# Patient Record
Sex: FEMALE | Race: WHITE | URBAN - METROPOLITAN AREA
[De-identification: names, ages, dates, MRNs, and addresses within clinical notes are randomized per-mention and may not be internally consistent; named-entity substitution may affect disease eponyms.]

---

## 2017-07-02 ENCOUNTER — EMERGENCY (EMERGENCY)
Facility: HOSPITAL | Age: 82
LOS: 0 days | Discharge: HOME | End: 2017-07-02

## 2017-07-02 DIAGNOSIS — I10 ESSENTIAL (PRIMARY) HYPERTENSION: ICD-10-CM

## 2017-07-02 DIAGNOSIS — M79.89 OTHER SPECIFIED SOFT TISSUE DISORDERS: ICD-10-CM

## 2017-07-02 DIAGNOSIS — M25.572 PAIN IN LEFT ANKLE AND JOINTS OF LEFT FOOT: ICD-10-CM

## 2017-07-02 DIAGNOSIS — E78.5 HYPERLIPIDEMIA, UNSPECIFIED: ICD-10-CM

## 2017-07-02 DIAGNOSIS — L53.9 ERYTHEMATOUS CONDITION, UNSPECIFIED: ICD-10-CM

## 2017-07-02 DIAGNOSIS — Z88.2 ALLERGY STATUS TO SULFONAMIDES: ICD-10-CM

## 2017-07-02 DIAGNOSIS — Z79.899 OTHER LONG TERM (CURRENT) DRUG THERAPY: ICD-10-CM

## 2017-07-02 DIAGNOSIS — Z88.0 ALLERGY STATUS TO PENICILLIN: ICD-10-CM

## 2017-07-02 DIAGNOSIS — Z96.652 PRESENCE OF LEFT ARTIFICIAL KNEE JOINT: ICD-10-CM

## 2017-07-02 DIAGNOSIS — Z95.5 PRESENCE OF CORONARY ANGIOPLASTY IMPLANT AND GRAFT: ICD-10-CM

## 2017-09-29 ENCOUNTER — OUTPATIENT (OUTPATIENT)
Dept: OUTPATIENT SERVICES | Facility: HOSPITAL | Age: 82
LOS: 1 days | Discharge: HOME | End: 2017-09-29

## 2017-09-29 DIAGNOSIS — Z12.31 ENCOUNTER FOR SCREENING MAMMOGRAM FOR MALIGNANT NEOPLASM OF BREAST: ICD-10-CM

## 2018-11-01 ENCOUNTER — OUTPATIENT (OUTPATIENT)
Dept: OUTPATIENT SERVICES | Facility: HOSPITAL | Age: 83
LOS: 1 days | Discharge: HOME | End: 2018-11-01

## 2018-11-01 DIAGNOSIS — Z85.3 PERSONAL HISTORY OF MALIGNANT NEOPLASM OF BREAST: ICD-10-CM

## 2018-11-01 DIAGNOSIS — Z12.31 ENCOUNTER FOR SCREENING MAMMOGRAM FOR MALIGNANT NEOPLASM OF BREAST: ICD-10-CM

## 2018-11-27 ENCOUNTER — OUTPATIENT (OUTPATIENT)
Dept: OUTPATIENT SERVICES | Facility: HOSPITAL | Age: 83
LOS: 1 days | Discharge: HOME | End: 2018-11-27

## 2018-11-27 DIAGNOSIS — J40 BRONCHITIS, NOT SPECIFIED AS ACUTE OR CHRONIC: ICD-10-CM

## 2019-03-18 ENCOUNTER — INPATIENT (INPATIENT)
Facility: HOSPITAL | Age: 84
LOS: 10 days | Discharge: HOME | End: 2019-03-29
Attending: OBSTETRICS & GYNECOLOGY | Admitting: OBSTETRICS & GYNECOLOGY
Payer: MEDICARE

## 2019-03-18 VITALS
HEART RATE: 80 BPM | TEMPERATURE: 96 F | OXYGEN SATURATION: 94 % | RESPIRATION RATE: 20 BRPM | SYSTOLIC BLOOD PRESSURE: 127 MMHG | DIASTOLIC BLOOD PRESSURE: 72 MMHG

## 2019-03-18 DIAGNOSIS — Z98.890 OTHER SPECIFIED POSTPROCEDURAL STATES: Chronic | ICD-10-CM

## 2019-03-18 DIAGNOSIS — Z96.652 PRESENCE OF LEFT ARTIFICIAL KNEE JOINT: Chronic | ICD-10-CM

## 2019-03-18 LAB
ALBUMIN SERPL ELPH-MCNC: 3.4 G/DL — LOW (ref 3.5–5.2)
ALP SERPL-CCNC: 159 U/L — HIGH (ref 30–115)
ALT FLD-CCNC: 58 U/L — HIGH (ref 0–41)
ANION GAP SERPL CALC-SCNC: 16 MMOL/L — HIGH (ref 7–14)
APTT BLD: 31.4 SEC — SIGNIFICANT CHANGE UP (ref 27–39.2)
AST SERPL-CCNC: 45 U/L — HIGH (ref 0–41)
BASOPHILS # BLD AUTO: 0 K/UL — SIGNIFICANT CHANGE UP (ref 0–0.2)
BASOPHILS NFR BLD AUTO: 0 % — SIGNIFICANT CHANGE UP (ref 0–1)
BILIRUB SERPL-MCNC: 0.5 MG/DL — SIGNIFICANT CHANGE UP (ref 0.2–1.2)
BUN SERPL-MCNC: 78 MG/DL — CRITICAL HIGH (ref 10–20)
CALCIUM SERPL-MCNC: 9.6 MG/DL — SIGNIFICANT CHANGE UP (ref 8.5–10.1)
CHLORIDE SERPL-SCNC: 101 MMOL/L — SIGNIFICANT CHANGE UP (ref 98–110)
CO2 SERPL-SCNC: 27 MMOL/L — SIGNIFICANT CHANGE UP (ref 17–32)
CREAT SERPL-MCNC: 1.2 MG/DL — SIGNIFICANT CHANGE UP (ref 0.7–1.5)
EOSINOPHIL # BLD AUTO: 0.17 K/UL — SIGNIFICANT CHANGE UP (ref 0–0.7)
EOSINOPHIL NFR BLD AUTO: 0.9 % — SIGNIFICANT CHANGE UP (ref 0–8)
GLUCOSE SERPL-MCNC: 101 MG/DL — HIGH (ref 70–99)
HCT VFR BLD CALC: 40.9 % — SIGNIFICANT CHANGE UP (ref 37–47)
HGB BLD-MCNC: 13.6 G/DL — SIGNIFICANT CHANGE UP (ref 12–16)
INR BLD: 1.47 RATIO — HIGH (ref 0.65–1.3)
LACTATE SERPL-SCNC: 1 MMOL/L — SIGNIFICANT CHANGE UP (ref 0.5–2.2)
LYMPHOCYTES # BLD AUTO: 0.67 K/UL — LOW (ref 1.2–3.4)
LYMPHOCYTES # BLD AUTO: 3.5 % — LOW (ref 20.5–51.1)
MANUAL SMEAR VERIFICATION: SIGNIFICANT CHANGE UP
MCHC RBC-ENTMCNC: 30.8 PG — SIGNIFICANT CHANGE UP (ref 27–31)
MCHC RBC-ENTMCNC: 33.3 G/DL — SIGNIFICANT CHANGE UP (ref 32–37)
MCV RBC AUTO: 92.5 FL — SIGNIFICANT CHANGE UP (ref 81–99)
MONOCYTES # BLD AUTO: 2.34 K/UL — HIGH (ref 0.1–0.6)
MONOCYTES NFR BLD AUTO: 12.3 % — HIGH (ref 1.7–9.3)
NEUTROPHILS # BLD AUTO: 14.53 K/UL — HIGH (ref 1.4–6.5)
NEUTROPHILS NFR BLD AUTO: 75.4 % — HIGH (ref 42.2–75.2)
NEUTS BAND # BLD: 0.9 % — SIGNIFICANT CHANGE UP (ref 0–6)
PLAT MORPH BLD: NORMAL — SIGNIFICANT CHANGE UP
PLATELET # BLD AUTO: 283 K/UL — SIGNIFICANT CHANGE UP (ref 130–400)
POIKILOCYTOSIS BLD QL AUTO: SIGNIFICANT CHANGE UP
POLYCHROMASIA BLD QL SMEAR: SLIGHT — SIGNIFICANT CHANGE UP
POTASSIUM SERPL-MCNC: 3.8 MMOL/L — SIGNIFICANT CHANGE UP (ref 3.5–5)
POTASSIUM SERPL-SCNC: 3.8 MMOL/L — SIGNIFICANT CHANGE UP (ref 3.5–5)
PROMYELOCYTES # FLD: 1.7 % — HIGH (ref 0–0)
PROT SERPL-MCNC: 5.9 G/DL — LOW (ref 6–8)
PROTHROM AB SERPL-ACNC: 16.8 SEC — HIGH (ref 9.95–12.87)
RBC # BLD: 4.42 M/UL — SIGNIFICANT CHANGE UP (ref 4.2–5.4)
RBC # FLD: 14.5 % — SIGNIFICANT CHANGE UP (ref 11.5–14.5)
RBC BLD AUTO: NORMAL — SIGNIFICANT CHANGE UP
SODIUM SERPL-SCNC: 144 MMOL/L — SIGNIFICANT CHANGE UP (ref 135–146)
VARIANT LYMPHS # BLD: 5.3 % — HIGH (ref 0–5)
WBC # BLD: 19.04 K/UL — HIGH (ref 4.8–10.8)
WBC # FLD AUTO: 19.04 K/UL — HIGH (ref 4.8–10.8)

## 2019-03-18 RX ORDER — SODIUM CHLORIDE 9 MG/ML
500 INJECTION INTRAMUSCULAR; INTRAVENOUS; SUBCUTANEOUS ONCE
Qty: 0 | Refills: 0 | Status: COMPLETED | OUTPATIENT
Start: 2019-03-18 | End: 2019-03-18

## 2019-03-18 RX ORDER — ATORVASTATIN CALCIUM 80 MG/1
1 TABLET, FILM COATED ORAL
Qty: 0 | Refills: 0 | COMMUNITY

## 2019-03-18 RX ORDER — SODIUM CHLORIDE 9 MG/ML
1000 INJECTION, SOLUTION INTRAVENOUS
Qty: 0 | Refills: 0 | Status: DISCONTINUED | OUTPATIENT
Start: 2019-03-18 | End: 2019-03-19

## 2019-03-18 RX ORDER — METRONIDAZOLE 500 MG
500 TABLET ORAL ONCE
Qty: 0 | Refills: 0 | Status: COMPLETED | OUTPATIENT
Start: 2019-03-18 | End: 2019-03-18

## 2019-03-18 RX ORDER — VANCOMYCIN HCL 1 G
1000 VIAL (EA) INTRAVENOUS EVERY 12 HOURS
Qty: 0 | Refills: 0 | Status: DISCONTINUED | OUTPATIENT
Start: 2019-03-18 | End: 2019-03-20

## 2019-03-18 RX ORDER — AZTREONAM 2 G
2000 VIAL (EA) INJECTION EVERY 12 HOURS
Qty: 0 | Refills: 0 | Status: DISCONTINUED | OUTPATIENT
Start: 2019-03-18 | End: 2019-03-20

## 2019-03-18 RX ORDER — METRONIDAZOLE 500 MG
500 TABLET ORAL EVERY 8 HOURS
Qty: 0 | Refills: 0 | Status: DISCONTINUED | OUTPATIENT
Start: 2019-03-18 | End: 2019-03-20

## 2019-03-18 RX ORDER — CLOPIDOGREL BISULFATE 75 MG/1
1 TABLET, FILM COATED ORAL
Qty: 0 | Refills: 0 | COMMUNITY

## 2019-03-18 RX ORDER — AZTREONAM 2 G
2000 VIAL (EA) INJECTION ONCE
Qty: 0 | Refills: 0 | Status: COMPLETED | OUTPATIENT
Start: 2019-03-18 | End: 2019-03-18

## 2019-03-18 RX ORDER — ATENOLOL 25 MG/1
1 TABLET ORAL
Qty: 0 | Refills: 0 | COMMUNITY

## 2019-03-18 RX ORDER — CANDESARTAN CILEXETIL 8 MG/1
1 TABLET ORAL
Qty: 0 | Refills: 0 | COMMUNITY

## 2019-03-18 RX ORDER — ESCITALOPRAM OXALATE 10 MG/1
1 TABLET, FILM COATED ORAL
Qty: 0 | Refills: 0 | COMMUNITY

## 2019-03-18 RX ORDER — APIXABAN 2.5 MG/1
1 TABLET, FILM COATED ORAL
Qty: 0 | Refills: 0 | COMMUNITY

## 2019-03-18 RX ORDER — CHLORTHALIDONE 50 MG
1 TABLET ORAL
Qty: 0 | Refills: 0 | COMMUNITY

## 2019-03-18 RX ORDER — VANCOMYCIN HCL 1 G
1000 VIAL (EA) INTRAVENOUS ONCE
Qty: 0 | Refills: 0 | Status: COMPLETED | OUTPATIENT
Start: 2019-03-18 | End: 2019-03-18

## 2019-03-18 RX ADMIN — Medication 100 MILLIGRAM(S): at 23:19

## 2019-03-18 RX ADMIN — SODIUM CHLORIDE 125 MILLILITER(S): 9 INJECTION, SOLUTION INTRAVENOUS at 21:04

## 2019-03-18 RX ADMIN — Medication 250 MILLIGRAM(S): at 21:22

## 2019-03-18 RX ADMIN — SODIUM CHLORIDE 500 MILLILITER(S): 9 INJECTION INTRAMUSCULAR; INTRAVENOUS; SUBCUTANEOUS at 20:32

## 2019-03-18 RX ADMIN — Medication 100 MILLIGRAM(S): at 18:34

## 2019-03-18 NOTE — H&P ADULT - HISTORY OF PRESENT ILLNESS
89yo , with right labial pain and swelling since Monday. She presented to her Gynecologist on Tuesday, Dr. Prather, who subsequently performed and L+D, and sent her home with Doxycycline BID for 7 days. She reports fevers of 100.7 that evening, which resolved the next day. She states since Tuesday, her right ford has been draining blood and dark fluid, foul smelling, and is extremely painful, 10/10. She stopped taking the doxycycline on Wednesday, secondary to dizziness, nausea, vomiting, prompting a fall around 0400 on Wednesday. She denies head trauma, and bruised her knees and wrists. She reports since then, the swelling has become larger and more painful at rest. Denies difficulty with urination, bowel movements, sexual activity, CP, SOB, palpitations, abdominal pain, vaginal pain. Reports she waited until this Monday to present to the ED secondary to having family in town. 89yo , with right labial pain and swelling since Monday. She presented to her Gynecologist on Tuesday, Dr. Prather, who subsequently performed and L+D, and sent her home with Doxycycline BID for 7 days. She reports fevers of 100.7 that evening, which resolved the next day. She states since Tuesday, her right ford has been draining blood and dark fluid, foul smelling, and is extremely painful, 10/10. She stopped taking the doxycycline on Wednesday, secondary to dizziness, nausea, vomiting, prompting a fall around 0400 on Wednesday. She denies head trauma, and bruised her knees and wrists. She reports since then, the swelling has become larger and more painful at rest. Denies difficulty with urination, bowel movements, sexual activity, CP, SOB, palpitations, abdominal pain, vaginal pain. Reports she waited until this Monday to present to the ED secondary to having family in town.   Ob: P2, NSVDX2 at [facility].   Gyn: Denies history of abnormal papsmears, STIs, uterine fibroids or ovarian cysts. Reports history of breast cancer in , s/p lumpectomy and radiation. No history of pelvic abscess or cysts. Not sexually active.

## 2019-03-18 NOTE — H&P ADULT - NSHPPHYSICALEXAM_GEN_ALL_CORE
Vital Signs Last 24 Hrs  T(C): 36.2 (18 Mar 2019 15:10), Max: 36.2 (18 Mar 2019 15:10)  T(F): 97.1 (18 Mar 2019 15:10), Max: 97.1 (18 Mar 2019 15:10)  HR: 76 (18 Mar 2019 15:10) (76 - 80)  BP: 124/64 (18 Mar 2019 15:10) (124/64 - 127/72)  RR: 19 (18 Mar 2019 15:10) (19 - 20)  SpO2: 96% (18 Mar 2019 15:10) (94% - 96%)    Gen: A+OX3. NAD.   CV: S1+S2.  Pulm: CTAB  Pelvis: right labial swelling, hard to palpation, tender, extending from the asis to the perineum, with dark, foul smelling fluid draining from inferior aspect of the infected area. Wound able to probed with qtip 8cm superiorly towards the mons pubis. Tender lymphadenopathy. No rectal tenderness. Cultures taken and sent. Vital Signs Last 24 Hrs  T(C): 36.2 (18 Mar 2019 15:10), Max: 36.2 (18 Mar 2019 15:10)  T(F): 97.1 (18 Mar 2019 15:10), Max: 97.1 (18 Mar 2019 15:10)  HR: 76 (18 Mar 2019 15:10) (76 - 80)  BP: 124/64 (18 Mar 2019 15:10) (124/64 - 127/72)  RR: 19 (18 Mar 2019 15:10) (19 - 20)  SpO2: 96% (18 Mar 2019 15:10) (94% - 96%)    Gen: A+OX3. NAD.   CV: S1+S2.  Pulm: CTAB  Pelvis: right labial swelling, about [id number], hard to palpation, tender, extending from the asis to the perineum, with dark, foul smelling fluid draining from inferior aspect of the infected area. Wound able to probed with qtip 8cm superiorly towards the mons pubis. Tender lymphadenopathy. No rectal tenderness. Cultures taken and sent. Vital Signs Last 24 Hrs  T(C): 36.2 (18 Mar 2019 15:10), Max: 36.2 (18 Mar 2019 15:10)  T(F): 97.1 (18 Mar 2019 15:10), Max: 97.1 (18 Mar 2019 15:10)  HR: 76 (18 Mar 2019 15:10) (76 - 80)  BP: 124/64 (18 Mar 2019 15:10) (124/64 - 127/72)  RR: 19 (18 Mar 2019 15:10) (19 - 20)  SpO2: 96% (18 Mar 2019 15:10) (94% - 96%)    Gen: A+OX3. NAD.   CV: S1+S2.  Pulm: CTAB  Pelvis: external genitalia: right labia majora with edema and induration. bottom 1/3 with 1cm area of yellow discoloration. 1cm caudal to that area there a small area of fluctuation. Upon separation of labia, a 0.5cm opening noted on right labia, from were brown foul smelling thick fluid is draining, cultures taken of this. Swelling and induration extends to the entire mons pubis. Tenderness upon palpation of right labia and R>L mons pubis. Lymphadenopathy palpated on the right side, tender. Pt could not tolerate speculum exam. Limited digital exam revealed tenderness to palpation to right vaginal wall and posterior vaginal wall. Rectal exam revealed no tenderness, no masses.   Through existing incision, syringe was used to irrigate with NS copiously. Qtip inserted to break loculations, and qtip was able to go 8cm cephalad towards the mons pubis, 3cm caudal towards the perineum.

## 2019-03-18 NOTE — CONSULT NOTE ADULT - ASSESSMENT
89 year old female with labial abscess s/p drainage by her gynecologist. Possible bartholin cyst vs abscess. Patient will need further incision in drainage. Surgery available at any time. Will continue to follow, 89 year old female with labial abscess s/p drainage by her gynecologist. Possible bartholin cyst vs abscess. Patient will need further incision in drainage. Surgery available at any time. Will continue to follow,   Senior Note  I have personally examined and evaluated the patient  I agree with the above plan and note  Surgical Attending aware and agrees with plan

## 2019-03-18 NOTE — ED PROVIDER NOTE - OBJECTIVE STATEMENT
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  pt states that she developed labial abscess 1 week ago and had it drained 6 days ago by her OB/GYN, had abscess drained and started on doxy.  Stopped taking med after 2.5 days 2/2 intolerance.  Since then noticed increased size of abscess and surrounding erythema.  today abscess w/ purulent drainage and erythema of entire right labia prompting visit to the ED.  Also reports mechanical trip and fall 5 days ago onto knees and right hand w/ no head injury or LOC.  Complaining of right wrist pain, bilateral knee pain.  reports ecchymosis to bilateral knees.  Describes pain as intermittent, mild severity, worse w/ ROm and alleviated w/ rest.  Denies fever, N/V/D, headache, chest pain, dyspnea, urinary sxs.  no further complaints.  has been ambulatory since the fall.

## 2019-03-18 NOTE — ED PROVIDER NOTE - PHYSICAL EXAMINATION
CONST: Well appearing in NAD  HEAD: NC/AT   EYES: Sclera and conjunctiva clear.  NECK: Non-tender, no meningeal signs, supple, no midline C/T/L tenderness  CARD: Normal S1 S2; Normal rate and rhythm  RESP: Equal BS B/L, No wheezes, rhonchi or rales. No distress  GI: Soft, non-tender, non-distended.  MS: + ecchymosis of bilateral knees, no pain w/ ROM, no joint laxity, no hip pain, + ecchymosis of wrist, + TTP of distal radius, Normal ROM in all extremities. No edema of lower extremities, no calf pain, radial pulses 2+ bilaterally  SKIN: Warm, dry, no acute rashes. Good turgor  NEURO: A&Ox3, No focal deficits. Strength 5/5 with no sensory deficits

## 2019-03-18 NOTE — ED PROVIDER NOTE - NS ED ROS FT
Constitutional: See HPI.  Eyes: No visual changes, eye pain or discharge  ENMT: No hearing changes, pain, discharge or infections. No neck pain or stiffness.   Cardiac: No SOB or edema. No chest pain with exertion.  Respiratory: No cough or respiratory distress.   GI: No nausea, vomiting, diarrhea or abdominal pain.  : + labial abscess, No dysuria, frequency or burning. No Discharge  MS:  + b/l knee pain, + right wrist pain. No myalgia, muscle weakness, joint pain or back pain.  Neuro: No headache or weakness. No LOC.  Skin: No skin rash.  Except as documented in the HPI, all other systems are negative.

## 2019-03-18 NOTE — ED PROVIDER NOTE - ATTENDING CONTRIBUTION TO CARE
89 yo female with PMH HTN, HLD, CAD s/p stent on Plavix, Afib on Eliquis, hx breast cancer presents for worsening labial abscess. Pt began having sx 1 week ago, had it drained by her GYN and was started on doxycycline which she took for 2 days and could not tolerate. Since that time pain and swelling have increased, no reported fevers or chills but decreased appetite noted. Also c/o ecchymoses to B/L knees s/p fall 5 days ago. Denies any head trauma, HA, dizziness, CP, N/V, SOB or palpitations. VS noted, agree with exam as above.  A/P: Labial abscess; Fall -labs, IV abx, GYN consult, XR, CT A/P, reassess & admit

## 2019-03-18 NOTE — H&P ADULT - ASSESSMENT
89yo , with likely right labial abscess with an elevated CBC count, and stable vital signs.   -Admit to GYN  -IV Antibiotics- Flagyl, aztreonam, vancomycin   -CT scan  -NPO until after CT  -CBC, CMP, lactate  -Ambulation with assistance  -IVF hydration   -punch biopsy tomorrow 89yo , with likely right labial abscess with an elevated CBC count, and stable vital signs.   -vanco/aztreonam/flagyl (per [facility] ID reccs for [place] allergy skin structure infection guideline  - reg diet (needs to be ordered after CT scan)  - SCDs  - ambulate with assistance  - f/u AM labs CBC, BMP, mag/phos (to be ordered)  - will need repeat drainage/I&D with biopsy tomorrow w/ possible packing  -need punch biopsy from [facility]  - f/u CT ab/pel (ordered by ED)  -call [facility] for records 89yo , with likely right labial abscess with an elevated CBC count, and stable vital signs.   -vanco/aztreonam/flagyl (per [facility] ID reccs for [place] allergy skin structure infection guideline  - reg diet (needs to be ordered after CT scan)  - SCDs  - ambulate with assistance  - f/u AM labs CBC, BMP, mag/phos (to be ordered)  - will need repeat drainage/I&D with biopsy tomorrow w/ possible packing  - f/u CT ab/pel  -call Dr Baltazar for records

## 2019-03-18 NOTE — ED PROVIDER NOTE - PROGRESS NOTE DETAILS
discussed case w/ lora, ob/gyn.  states to call back w/ wbc count Case d/w GYN who evaluated pt I ED earlier and requested Azactam/Flagyl/Vanco and CT A/P with planned admission to medicine for further treatment. Pt s/o to  Dr. Garcia to follow up CT A/P, reassess and admit. Case d/w GYN who evaluated pt I ED earlier and requested Azactam/Flagyl/Vanco and CT A/P with planned admission to medicine for further treatment. CT A/P showing necrotic abscess with air noted. Gen surgery consult called, Dr. Lundberg to evaluate pt. GYN notified about CT, senior resident Pearl to speak with Dr. Lieberman. Pt s/o to  Dr. Garcia to follow up surgery & gyn consult, reassess and admit. Case endorsed to me by Dr. Packer --- patient with failed outpatient treatment of labial abscess (oral abx and bedside I/D), now with leukocytosis, CT with air concerning for necrotizing infection vs post I/D air  -- patient was seen by GYN and surgery -- given abx according to guidelines for allergy and deep tissue infection, will be admitted to  GYN for further management of deep tissue infection, surgery will follow as inpatient. lactate is 1, hemodynamically stable.

## 2019-03-18 NOTE — H&P ADULT - NSHPLABSRESULTS_GEN_ALL_CORE
Complete Blood Count + Automated Diff (03.18.19 @ 15:30)    WBC Count: 19.04 K/uL    RBC Count: 4.42 M/uL    Hemoglobin: 13.6 g/dL    Hematocrit: 40.9 %    Mean Cell Volume: 92.5 fL    Mean Cell Hemoglobin: 30.8 pg    Mean Cell Hemoglobin Conc: 33.3 g/dL    Red Cell Distrib Width: 14.5 %    Platelet Count - Automated: 283 K/uL Complete Blood Count + Automated Diff (03.18.19 @ 15:30)    WBC Count: 19.04 K/uL    RBC Count: 4.42 M/uL    Hemoglobin: 13.6 g/dL    Hematocrit: 40.9 %    Mean Cell Volume: 92.5 fL    Mean Cell Hemoglobin: 30.8 pg    Mean Cell Hemoglobin Conc: 33.3 g/dL    Red Cell Distrib Width: 14.5 %    Platelet Count - Automated: 283 K/uL    Lactate: 1.0

## 2019-03-18 NOTE — CONSULT NOTE ADULT - SUBJECTIVE AND OBJECTIVE BOX
This is a 89 year old female with PMH of ___ . Presents to the ED with        Labs:  CAPILLARY BLOOD GLUCOSE                  13.6   19.04 )-----------( 283      ( 18 Mar 2019 15:30 )             40.9       Auto Neutrophil %: 75.4 % (03-18-19 @ 15:30)  Band Neutrophils %: 0.9 % (03-18-19 @ 15:30)    03-18    144  |  101  |  78<HH>  ----------------------------<  101<H>  3.8   |  27  |  1.2      Calcium, Total Serum: 9.6 mg/dL (03-18-19 @ 15:30)      LFTs:             5.9  | 0.5  | 45       ------------------[159     ( 18 Mar 2019 15:30 )  3.4  | x    | 58          Lipase:x      Amylase:x         Lactate, Blood: 1.0 mmol/L (03-18-19 @ 15:30)      Coags:     16.80  ----< 1.47    ( 18 Mar 2019 15:30 )     31.4       < from: CT Abdomen and Pelvis w/ IV Cont (03.18.19 @ 21:18) >  Right labial abscess containing multiple foci of air measuring 4 x 4 x 6   cm.this may reflect underlying necrosis and may also reflect sequela of   prior drainage. Abscess tracks superiorly along anterior subcutaneous   fat. No liquid drainable collection.    < end of copied text > This is a 89 year old female with PMH of afib, coronary artery stent, breast cancer, and cataract surgery. Medications include plavix, chlorthalidone, atenolol, eliquis, candesartan, atorvastatin, and lexapro. Presents to the ED with worsening painful, draining labial abscess that she first noted approximately one week ago. Patent went to her OB/GYN who drained it and prescribed her antibiotics, which she stopped taking after approximately one day. Patient stated there has been constant, worsening bloody/dark colored drainage from the area and worsening pain that patient states is 10/10. Patient states she had one fever following the drainage by her gynecologist. Denies any other fever, chills, nausea, and vomiting.       Labs:  CAPILLARY BLOOD GLUCOSE                  13.6   19.04 )-----------( 283      ( 18 Mar 2019 15:30 )             40.9       Auto Neutrophil %: 75.4 % (03-18-19 @ 15:30)  Band Neutrophils %: 0.9 % (03-18-19 @ 15:30)    03-18    144  |  101  |  78<HH>  ----------------------------<  101<H>  3.8   |  27  |  1.2      Calcium, Total Serum: 9.6 mg/dL (03-18-19 @ 15:30)      LFTs:             5.9  | 0.5  | 45       ------------------[159     ( 18 Mar 2019 15:30 )  3.4  | x    | 58          Lipase:x      Amylase:x         Lactate, Blood: 1.0 mmol/L (03-18-19 @ 15:30)      Coags:     16.80  ----< 1.47    ( 18 Mar 2019 15:30 )     31.4       < from: CT Abdomen and Pelvis w/ IV Cont (03.18.19 @ 21:18) >  Right labial abscess containing multiple foci of air measuring 4 x 4 x 6   cm. this may reflect underlying necrosis and may also reflect sequela of   prior drainage. Abscess tracks superiorly along anterior subcutaneous   fat. No liquid drainable collection.    < end of copied text >      Physical exam:   General: Lying in bed. Alert and orientated.   : Swelling and erythema confined to right labia major, tenderness illicited on palpations of area, clear/serosanguinous drainage noted.

## 2019-03-18 NOTE — ED PROVIDER NOTE - CLINICAL SUMMARY MEDICAL DECISION MAKING FREE TEXT BOX
Case endorsed to me by Dr. Packer --- patient with failed outpatient treatment of labial abscess (oral abx and bedside I/D), now with leukocytosis, CT with air concerning for necrotizing infection vs post I/D air  -- patient was seen by GYN and surgery -- given abx according to guidelines for allergy and deep tissue infection, will be admitted to  GYN for further management of deep tissue infection, surgery will follow as inpatient. lactate is 1, hemodynamically stable.

## 2019-03-18 NOTE — H&P ADULT - NSICDXPASTMEDICALHX_GEN_ALL_CORE_FT
PAST MEDICAL HISTORY:  Atrial fibrillation     Breast cancer     Cataract     Stented coronary artery

## 2019-03-19 LAB
ALBUMIN SERPL ELPH-MCNC: 2.6 G/DL — LOW (ref 3.5–5.2)
ALP SERPL-CCNC: 111 U/L — SIGNIFICANT CHANGE UP (ref 30–115)
ALT FLD-CCNC: 34 U/L — SIGNIFICANT CHANGE UP (ref 0–41)
ANION GAP SERPL CALC-SCNC: 12 MMOL/L — SIGNIFICANT CHANGE UP (ref 7–14)
ANION GAP SERPL CALC-SCNC: 15 MMOL/L — HIGH (ref 7–14)
AST SERPL-CCNC: 31 U/L — SIGNIFICANT CHANGE UP (ref 0–41)
BASOPHILS # BLD AUTO: 0.06 K/UL — SIGNIFICANT CHANGE UP (ref 0–0.2)
BASOPHILS NFR BLD AUTO: 0.4 % — SIGNIFICANT CHANGE UP (ref 0–1)
BILIRUB SERPL-MCNC: 0.5 MG/DL — SIGNIFICANT CHANGE UP (ref 0.2–1.2)
BLD GP AB SCN SERPL QL: SIGNIFICANT CHANGE UP
BUN SERPL-MCNC: 41 MG/DL — HIGH (ref 10–20)
BUN SERPL-MCNC: 49 MG/DL — HIGH (ref 10–20)
CALCIUM SERPL-MCNC: 8.1 MG/DL — LOW (ref 8.5–10.1)
CALCIUM SERPL-MCNC: 8.9 MG/DL — SIGNIFICANT CHANGE UP (ref 8.5–10.1)
CHLORIDE SERPL-SCNC: 91 MMOL/L — LOW (ref 98–110)
CHLORIDE SERPL-SCNC: 99 MMOL/L — SIGNIFICANT CHANGE UP (ref 98–110)
CO2 SERPL-SCNC: 23 MMOL/L — SIGNIFICANT CHANGE UP (ref 17–32)
CO2 SERPL-SCNC: 23 MMOL/L — SIGNIFICANT CHANGE UP (ref 17–32)
CREAT SERPL-MCNC: 0.9 MG/DL — SIGNIFICANT CHANGE UP (ref 0.7–1.5)
CREAT SERPL-MCNC: 0.9 MG/DL — SIGNIFICANT CHANGE UP (ref 0.7–1.5)
EOSINOPHIL # BLD AUTO: 0.12 K/UL — SIGNIFICANT CHANGE UP (ref 0–0.7)
EOSINOPHIL NFR BLD AUTO: 0.8 % — SIGNIFICANT CHANGE UP (ref 0–8)
GLUCOSE BLDC GLUCOMTR-MCNC: 112 MG/DL — HIGH (ref 70–99)
GLUCOSE BLDC GLUCOMTR-MCNC: 174 MG/DL — HIGH (ref 70–99)
GLUCOSE SERPL-MCNC: 118 MG/DL — HIGH (ref 70–99)
GLUCOSE SERPL-MCNC: 493 MG/DL — CRITICAL HIGH (ref 70–99)
HCT VFR BLD CALC: 35 % — LOW (ref 37–47)
HGB BLD-MCNC: 11.8 G/DL — LOW (ref 12–16)
IMM GRANULOCYTES NFR BLD AUTO: 5.2 % — HIGH (ref 0.1–0.3)
LACTATE SERPL-SCNC: 1.2 MMOL/L — SIGNIFICANT CHANGE UP (ref 0.5–2.2)
LYMPHOCYTES # BLD AUTO: 1.51 K/UL — SIGNIFICANT CHANGE UP (ref 1.2–3.4)
LYMPHOCYTES # BLD AUTO: 10.3 % — LOW (ref 20.5–51.1)
MAGNESIUM SERPL-MCNC: 1.7 MG/DL — LOW (ref 1.8–2.4)
MCHC RBC-ENTMCNC: 31.1 PG — HIGH (ref 27–31)
MCHC RBC-ENTMCNC: 33.7 G/DL — SIGNIFICANT CHANGE UP (ref 32–37)
MCV RBC AUTO: 92.3 FL — SIGNIFICANT CHANGE UP (ref 81–99)
MONOCYTES # BLD AUTO: 1.46 K/UL — HIGH (ref 0.1–0.6)
MONOCYTES NFR BLD AUTO: 10 % — HIGH (ref 1.7–9.3)
NEUTROPHILS # BLD AUTO: 10.69 K/UL — HIGH (ref 1.4–6.5)
NEUTROPHILS NFR BLD AUTO: 73.3 % — SIGNIFICANT CHANGE UP (ref 42.2–75.2)
NRBC # BLD: 0 /100 WBCS — SIGNIFICANT CHANGE UP (ref 0–0)
PLATELET # BLD AUTO: 248 K/UL — SIGNIFICANT CHANGE UP (ref 130–400)
POTASSIUM SERPL-MCNC: 3.1 MMOL/L — LOW (ref 3.5–5)
POTASSIUM SERPL-MCNC: 3.6 MMOL/L — SIGNIFICANT CHANGE UP (ref 3.5–5)
POTASSIUM SERPL-SCNC: 3.1 MMOL/L — LOW (ref 3.5–5)
POTASSIUM SERPL-SCNC: 3.6 MMOL/L — SIGNIFICANT CHANGE UP (ref 3.5–5)
PROT SERPL-MCNC: 5 G/DL — LOW (ref 6–8)
RBC # BLD: 3.79 M/UL — LOW (ref 4.2–5.4)
RBC # FLD: 14.6 % — HIGH (ref 11.5–14.5)
SODIUM SERPL-SCNC: 126 MMOL/L — LOW (ref 135–146)
SODIUM SERPL-SCNC: 137 MMOL/L — SIGNIFICANT CHANGE UP (ref 135–146)
TYPE + AB SCN PNL BLD: SIGNIFICANT CHANGE UP
WBC # BLD: 14.6 K/UL — HIGH (ref 4.8–10.8)
WBC # FLD AUTO: 14.6 K/UL — HIGH (ref 4.8–10.8)

## 2019-03-19 RX ORDER — DEXTROSE 50 % IN WATER 50 %
15 SYRINGE (ML) INTRAVENOUS ONCE
Qty: 0 | Refills: 0 | Status: DISCONTINUED | OUTPATIENT
Start: 2019-03-19 | End: 2019-03-20

## 2019-03-19 RX ORDER — MORPHINE SULFATE 50 MG/1
4 CAPSULE, EXTENDED RELEASE ORAL EVERY 4 HOURS
Qty: 0 | Refills: 0 | Status: DISCONTINUED | OUTPATIENT
Start: 2019-03-19 | End: 2019-03-20

## 2019-03-19 RX ORDER — ATENOLOL 25 MG/1
25 TABLET ORAL DAILY
Qty: 0 | Refills: 0 | Status: DISCONTINUED | OUTPATIENT
Start: 2019-03-19 | End: 2019-03-20

## 2019-03-19 RX ORDER — SODIUM CHLORIDE 9 MG/ML
1000 INJECTION, SOLUTION INTRAVENOUS
Qty: 0 | Refills: 0 | Status: DISCONTINUED | OUTPATIENT
Start: 2019-03-19 | End: 2019-03-20

## 2019-03-19 RX ORDER — POTASSIUM CHLORIDE 20 MEQ
20 PACKET (EA) ORAL ONCE
Qty: 0 | Refills: 0 | Status: COMPLETED | OUTPATIENT
Start: 2019-03-19 | End: 2019-03-19

## 2019-03-19 RX ORDER — APIXABAN 2.5 MG/1
2.5 TABLET, FILM COATED ORAL EVERY 12 HOURS
Qty: 0 | Refills: 0 | Status: DISCONTINUED | OUTPATIENT
Start: 2019-03-19 | End: 2019-03-19

## 2019-03-19 RX ORDER — GLUCAGON INJECTION, SOLUTION 0.5 MG/.1ML
1 INJECTION, SOLUTION SUBCUTANEOUS ONCE
Qty: 0 | Refills: 0 | Status: DISCONTINUED | OUTPATIENT
Start: 2019-03-19 | End: 2019-03-20

## 2019-03-19 RX ORDER — SODIUM CHLORIDE 9 MG/ML
1000 INJECTION, SOLUTION INTRAVENOUS
Qty: 0 | Refills: 0 | Status: DISCONTINUED | OUTPATIENT
Start: 2019-03-19 | End: 2019-03-19

## 2019-03-19 RX ORDER — ESCITALOPRAM OXALATE 10 MG/1
10 TABLET, FILM COATED ORAL DAILY
Qty: 0 | Refills: 0 | Status: DISCONTINUED | OUTPATIENT
Start: 2019-03-19 | End: 2019-03-20

## 2019-03-19 RX ORDER — ACETAMINOPHEN 500 MG
650 TABLET ORAL EVERY 6 HOURS
Qty: 0 | Refills: 0 | Status: DISCONTINUED | OUTPATIENT
Start: 2019-03-19 | End: 2019-03-20

## 2019-03-19 RX ORDER — ONDANSETRON 8 MG/1
4 TABLET, FILM COATED ORAL ONCE
Qty: 0 | Refills: 0 | Status: DISCONTINUED | OUTPATIENT
Start: 2019-03-19 | End: 2019-03-20

## 2019-03-19 RX ORDER — PANTOPRAZOLE SODIUM 20 MG/1
40 TABLET, DELAYED RELEASE ORAL DAILY
Qty: 0 | Refills: 0 | Status: DISCONTINUED | OUTPATIENT
Start: 2019-03-19 | End: 2019-03-20

## 2019-03-19 RX ORDER — ATORVASTATIN CALCIUM 80 MG/1
40 TABLET, FILM COATED ORAL AT BEDTIME
Qty: 0 | Refills: 0 | Status: DISCONTINUED | OUTPATIENT
Start: 2019-03-19 | End: 2019-03-20

## 2019-03-19 RX ORDER — LOSARTAN POTASSIUM 100 MG/1
50 TABLET, FILM COATED ORAL DAILY
Qty: 0 | Refills: 0 | Status: DISCONTINUED | OUTPATIENT
Start: 2019-03-19 | End: 2019-03-20

## 2019-03-19 RX ORDER — DEXTROSE 50 % IN WATER 50 %
25 SYRINGE (ML) INTRAVENOUS ONCE
Qty: 0 | Refills: 0 | Status: DISCONTINUED | OUTPATIENT
Start: 2019-03-19 | End: 2019-03-20

## 2019-03-19 RX ORDER — DOCUSATE SODIUM 100 MG
100 CAPSULE ORAL DAILY
Qty: 0 | Refills: 0 | Status: DISCONTINUED | OUTPATIENT
Start: 2019-03-19 | End: 2019-03-20

## 2019-03-19 RX ORDER — CLOPIDOGREL BISULFATE 75 MG/1
75 TABLET, FILM COATED ORAL DAILY
Qty: 0 | Refills: 0 | Status: DISCONTINUED | OUTPATIENT
Start: 2019-03-19 | End: 2019-03-20

## 2019-03-19 RX ORDER — DEXTROSE 50 % IN WATER 50 %
12.5 SYRINGE (ML) INTRAVENOUS ONCE
Qty: 0 | Refills: 0 | Status: DISCONTINUED | OUTPATIENT
Start: 2019-03-19 | End: 2019-03-20

## 2019-03-19 RX ORDER — IBUPROFEN 200 MG
600 TABLET ORAL EVERY 6 HOURS
Qty: 0 | Refills: 0 | Status: DISCONTINUED | OUTPATIENT
Start: 2019-03-19 | End: 2019-03-19

## 2019-03-19 RX ORDER — MAGNESIUM SULFATE 500 MG/ML
2 VIAL (ML) INJECTION ONCE
Qty: 0 | Refills: 0 | Status: COMPLETED | OUTPATIENT
Start: 2019-03-19 | End: 2019-03-19

## 2019-03-19 RX ADMIN — Medication 100 MILLIGRAM(S): at 06:08

## 2019-03-19 RX ADMIN — SODIUM CHLORIDE 125 MILLILITER(S): 9 INJECTION, SOLUTION INTRAVENOUS at 06:07

## 2019-03-19 RX ADMIN — ATORVASTATIN CALCIUM 40 MILLIGRAM(S): 80 TABLET, FILM COATED ORAL at 22:12

## 2019-03-19 RX ADMIN — SODIUM CHLORIDE 75 MILLILITER(S): 9 INJECTION, SOLUTION INTRAVENOUS at 19:00

## 2019-03-19 RX ADMIN — Medication 250 MILLIGRAM(S): at 17:13

## 2019-03-19 RX ADMIN — Medication 250 MILLIGRAM(S): at 06:08

## 2019-03-19 RX ADMIN — SODIUM CHLORIDE 125 MILLILITER(S): 9 INJECTION, SOLUTION INTRAVENOUS at 11:14

## 2019-03-19 RX ADMIN — Medication 100 MILLIGRAM(S): at 13:01

## 2019-03-19 RX ADMIN — Medication 50 MILLIEQUIVALENT(S): at 17:27

## 2019-03-19 RX ADMIN — CLOPIDOGREL BISULFATE 75 MILLIGRAM(S): 75 TABLET, FILM COATED ORAL at 11:14

## 2019-03-19 RX ADMIN — PANTOPRAZOLE SODIUM 40 MILLIGRAM(S): 20 TABLET, DELAYED RELEASE ORAL at 06:07

## 2019-03-19 RX ADMIN — APIXABAN 2.5 MILLIGRAM(S): 2.5 TABLET, FILM COATED ORAL at 06:07

## 2019-03-19 RX ADMIN — Medication 100 MILLIGRAM(S): at 17:13

## 2019-03-19 RX ADMIN — Medication 100 MILLIGRAM(S): at 22:12

## 2019-03-19 RX ADMIN — SODIUM CHLORIDE 125 MILLILITER(S): 9 INJECTION, SOLUTION INTRAVENOUS at 06:09

## 2019-03-19 NOTE — CONSULT NOTE ADULT - ASSESSMENT
history  CAD PCI  AFIB  PREOP   at moderate   cardiac  risk         continue plavix        hold   eliquis  1-2  days

## 2019-03-19 NOTE — PROGRESS NOTE ADULT - ASSESSMENT
89yo P2, now hospital day 2, with likely right labial abscess, [id number] labial abscess with free air and underlying necrosis visualized on CT, with improving leukocytosis, and stable vital signs.  - continue vanco/aztreonam/flagyl  - Cardiac clearance for possible OR today   - NPO for OR planning  - elevated glucose on CMP, sliding scale insulin regimen, finger sticks postprandial  - SCDs for DVT prophylaxis  - ambulate with assistance  -call [facility] for records    Dr. Therese guillory

## 2019-03-19 NOTE — PROGRESS NOTE ADULT - ASSESSMENT
89yo P2, now hospital day 2, with likely right labial abscess, [id number] visualized on CT, with an elevated CBC count, and stable vital signs.  -continue vanco/aztreonam/flagyl  - reg diet as tolerated  - SCDs for DVT prophylaxis  - ambulate with assistance  - f/u AM labs CBC, CMP, lactate  - will need repeat drainage/I&D with biopsy today w/ possible packing  - obtain punch biopsy from [facility]   -call [facility] for records    Dr. Lomeli aware

## 2019-03-19 NOTE — PROGRESS NOTE ADULT - SUBJECTIVE AND OBJECTIVE BOX
ROPERTI, BENITEZ  89y      Planned Procedure___I&D of right labial abscess possibly under general anesthesia____________________________________    Does the patient have the following conditions? Type Y or N    __N__DVT/PE           	  Currently anticoagulated ______ IVC Filter _______ Date:______    __N__DM                             Controlled    Y _______ N _______    __Y__HTN	                              Controlled    Y ___x____ N _______    __Y__CAD	                                  Prior MI  _____CABG _____STENT  __x____ EF _____    _N___CHF                             Chronic _____ Acute _____ EF ______    _Y___Afib	                                  Current Rhythm __NSR_______   Current anticoagulation ___apixaban__(now on hold)____    __N__PPM/ICD                         Indication ___________  last Interrogated _________    __N__OSA	                              PAP  Type &Settings _____________    __N__Asthma/COPD	          Last Exac _______ Last Steroid use Date _______     __N__O2 dependent	          Reason ______________    __Y__CKD or ABRAHAN	                  Stable Y ___improving__  N ______    _Y___Electrolyte Abn	          Type _hyponatremia/hypokalemia - awaiting repeat BMP__________     Stable Y ______   N _______    _N___Cirrhosis	                  Cause __________     Stable Y ______   N _______    __N__GI Bleed                          Cause __________     Stable Y ______   N _______    __N__Anemia	                          Cause __________     Stable Y ______   N _______    __N__Transfusion                  Last date ________      _Y___ETOH Use	   wine at parties               Last date________     Intervention __________________________    _N___Tobacco Abuse	          Last date ________    Intervention __________________________    _N___Drug Use	                 Type____________  Last dose _____ Intervention______________    ____Other                             Details_________________________________________________      PAST MEDICAL & SURGICAL HISTORY:  Atrial fibrillation on Eliquis  CAD s/p 3 stents  Cataracts  Breast cancer  S/P lumpectomy, right breast and RT 1992  H/O total knee replacement, left  HTN  hyperlipidemia    Allergies    Levaquin (Unknown)  penicillin (Hives)  sulfa drugs (Hives)    Intolerances      MEDICATIONS  (STANDING):  ATENolol  Tablet 25 milliGRAM(s) Oral daily  atorvastatin 40 milliGRAM(s) Oral at bedtime  aztreonam  IVPB 2000 milliGRAM(s) IV Intermittent every 12 hours  clopidogrel Tablet 75 milliGRAM(s) Oral daily  dextrose 5%. 1000 milliLiter(s) (50 mL/Hr) IV Continuous <Continuous>  dextrose 50% Injectable 12.5 Gram(s) IV Push once  dextrose 50% Injectable 25 Gram(s) IV Push once  dextrose 50% Injectable 25 Gram(s) IV Push once  docusate sodium 100 milliGRAM(s) Oral daily  escitalopram 10 milliGRAM(s) Oral daily  lactated ringers. 1000 milliLiter(s) (125 mL/Hr) IV Continuous <Continuous>  lactated ringers. 1000 milliLiter(s) (125 mL/Hr) IV Continuous <Continuous>  losartan 50 milliGRAM(s) Oral daily  metroNIDAZOLE  IVPB 500 milliGRAM(s) IV Intermittent every 8 hours  ondansetron Injectable 4 milliGRAM(s) IV Push once  pantoprazole  Injectable 40 milliGRAM(s) IV Push daily  vancomycin  IVPB 1000 milliGRAM(s) IV Intermittent every 12 hours    MEDICATIONS  (PRN):  acetaminophen   Tablet .. 650 milliGRAM(s) Oral every 6 hours PRN Temp greater or equal to 38C (100.4F)  dextrose 40% Gel 15 Gram(s) Oral once PRN Blood Glucose LESS THAN 70 milliGRAM(s)/deciliter  glucagon  Injectable 1 milliGRAM(s) IntraMuscular once PRN Glucose LESS THAN 70 milligrams/deciliter  ibuprofen  Tablet. 600 milliGRAM(s) Oral every 6 hours PRN Moderate Pain (4 - 6)  morphine  - Injectable 4 milliGRAM(s) IV Push every 4 hours PRN Severe Pain (7 - 10)      FH: N/C    ROS: pain of right labia with drainage, s/p I&D on Tuesday per private GYN, fever to 100.7 at that time, nausea, no vomiting, decreased PO intake, no diarrhea  no chest pain or SOB  ambulates without device normally  unable to ambulate now due to pain  still driving  lives in home with daughter  s/p fall at home - no head trauma, landed on her knees  reviewed and otherwise negative    [facility] Activity Status Index: Can the patient climb a flight of stairs or walk uphill?   ____X__ N   <4 METS   _______ Y > 4 METS    Physical Exam:  Vital Signs Last 24 Hrs  T(C): 36.6 (19 Mar 2019 12:30), Max: 36.9 (19 Mar 2019 05:22)  T(F): 97.9 (19 Mar 2019 12:30), Max: 98.4 (19 Mar 2019 05:22)  HR: 80 (19 Mar 2019 12:30) (71 - 80)  BP: 133/63 (19 Mar 2019 12:30) (124/64 - 135/60)  BP(mean): --  RR: 18 (19 Mar 2019 12:30) (18 - 20)  SpO2: 97% (18 Mar 2019 23:38) (96% - 97%)    General: WDWN elderly woman appearing younger than stated age    HEENT: NC, anicteric, MMM    Respiratory: CTA b/l    CVS: RRR S1S2    GI: soft, ND, tender in RLQ, no guarding  : deferred due to pain - see GYN note    Ext: no edema, SCD in place  knees with b/l ecchymoses, pt able to move actively    Neuro: moves all extremities    Psych: awake, alert, oriented, pleasant    LABS AND OTHER PERTINENT TESTS:                          11.8   14.60 )-----------( 248      ( 19 Mar 2019 07:46 )             35.0     03-19    126<L>  |  91<L>  |  49<H>  ----------------------------<  493<HH>  3.1<L>   |  23  |  0.9    Ca    8.1<L>      19 Mar 2019 07:46    TPro  5.0<L>  /  Alb  2.6<L>  /  TBili  0.5  /  DBili  x   /  AST  31  /  ALT  34  /  AlkPhos  111  03-19    PT/INR - ( 18 Mar 2019 15:30 )   PT: 16.80 sec;   INR: 1.47 ratio         PTT - ( 18 Mar 2019 15:30 )  PTT:31.4 sec    < from: CT Abdomen and Pelvis w/ IV Cont (03.18.19 @ 21:18) >    IMPRESSION:        Right labial abscess containing multiple foci of air measuring 4 x 4 x 6   cm.this may reflect underlying necrosis and may also reflect sequela of   prior drainage. Abscess tracks superiorly along anterior subcutaneous   fat. No liquid drainable collection.      < end of copied text >    < from: Xray Chest 1 View- PORTABLE-Routine (03.18.19 @ 15:43) >  Impression:      Elevation the right hemidiaphragm with adjacent opacification.    Follow-up as needed.    < end of copied text >    CT scan of lower chest with atelectasis    < from: 12 Lead ECG (03.18.19 @ 18:15) >  Diagnosis Line Sinus rhythm with 1st degree A-V block  Low voltage QRS  Incomplete right bundle branch block  T wave abnormality, consider anterior ischemia  Abnormal ECG    < end of copied text >          Risk Assessment: (Use One)    [employer] NSQIP Surgical Risk Calculator http://riskcalculator.facs.org/      Revised Cardiac Risk Index 0      ________  The patient is optimized for surgery/procedure and may proceed to the operating room as scheduled    _____x____The patient is NOT optimized for surgery/procedure and should not proceed to the operating room as scheduled      Recommendations for optimization:    1. Repeat BMP STAT to evaluate if pt truly has hyponatremia and hypokalemia.  Replete hypokalemia if confirmed. Check magnesium level.    2. Avoid volume overload. Would reduce IVF to 100cc/hr while NPO.    3. Pt is deemed moderate risk for cardiac complications perioperatively by cardiology.    4. Continue Plavix per cardiology.    5. Pt received apixaban today at 6AM. Ideally would want to hold it for at least 1 day before procedure with low bleeding risk. But if the benefits of surgery (I&D for suspected necrotizing infection and pt at risk for worsening sepsis) outweigh the risks of bleeding, then proceed with surgery.    6. Incentive spirometry and instruct pt on its use.    7. Monitor FS (glucose > 400 on CMP today likely lab error)    8. ABRAHAN - improving on IVF    9. Discontinue ibuprofen           Anticoagulation Recommendations:     Apixaban on hold for surgery. SCD in place.      Please recall with any questions or concerns. Spectra 1504.   Thank you for the consult.

## 2019-03-19 NOTE — PROGRESS NOTE ADULT - SUBJECTIVE AND OBJECTIVE BOX
BENITEZ FOX  89y  Female    PGY1 Note:    Patient seen at bedside, HD#2. She is s/p drainage of R labial abscess, visualized yesterday (3/18). This morning, patient is resting comfortably in bed and is no apparent distress. No acute overnight events. Patient continues to complain of pain at the site of the abscess with palpation, but reports some improvement following the drainage. Patient also states that the abscess is draining less fluid following the procedure. She denies any chest pain, subjective fevers, diaphoresis, shortness of breath, or N/V/D. She is currently not able to ambulate 2/2 to pain associated with the abscess.     MEDICATIONS  (STANDING):  apixaban 2.5 milliGRAM(s) Oral every 12 hours  aztreonam  IVPB 2000 milliGRAM(s) IV Intermittent every 12 hours  clopidogrel Tablet 75 milliGRAM(s) Oral daily  docusate sodium 100 milliGRAM(s) Oral daily  lactated ringers. 1000 milliLiter(s) (125 mL/Hr) IV Continuous <Continuous>  lactated ringers. 1000 milliLiter(s) (125 mL/Hr) IV Continuous <Continuous>  metroNIDAZOLE  IVPB 500 milliGRAM(s) IV Intermittent every 8 hours  ondansetron Injectable 4 milliGRAM(s) IV Push once  pantoprazole  Injectable 40 milliGRAM(s) IV Push daily  vancomycin  IVPB 1000 milliGRAM(s) IV Intermittent every 12 hours    MEDICATIONS  (PRN):  acetaminophen   Tablet .. 650 milliGRAM(s) Oral every 6 hours PRN Temp greater or equal to 38C (100.4F)  ibuprofen  Tablet. 600 milliGRAM(s) Oral every 6 hours PRN Moderate Pain (4 - 6)  morphine  - Injectable 4 milliGRAM(s) IV Push every 4 hours PRN Severe Pain (7 - 10)      PAST MEDICAL & SURGICAL HISTORY:  Atrial fibrillation  Stented coronary artery  Cataract  Breast cancer  S/P lumpectomy, right breast  H/O total knee replacement, left      Physical Exam  Vital Signs Last 24 Hrs  T(C): 36.9 (19 Mar 2019 05:22), Max: 36.9 (19 Mar 2019 05:22)  T(F): 98.4 (19 Mar 2019 05:22), Max: 98.4 (19 Mar 2019 05:22)  HR: 77 (19 Mar 2019 05:22) (71 - 80)  BP: 135/60 (19 Mar 2019 05:22) (124/64 - 135/60)  RR: 18 (19 Mar 2019 05:22) (18 - 20)  SpO2: 97% (18 Mar 2019 23:38) (94% - 97%)    Gen: AAOx3, NAD  CV: RRR. No murmors gallops or rubs.  Pulm: CTAB. No wheezes or rales.  Ext: No calf tenderness, no swelling. SCDs in place. IV site infiltrated on right arm.   Abd: Soft, nontender, nondistended, BS+  Pelvis:  Right labial swelling has decreased in size from about 4cm to 3cm, no drainage on mild palpation. No surrounding erythema.       Labs:                        13.6   19.04 )-----------( 283      ( 18 Mar 2019 15:30 )             40.9     lactate 1.0     < from: CT Abdomen and Pelvis w/ IV Cont (03.18.19 @ 21:18) >    PELVIC ORGANS: Distended urinary bladder.  PERITONEUM/MESENTERY/BOWEL: No obstruction. No pneumoperitoneum.   Diverticulosis of the sigmoid and descending colon..  BONES/SOFT TISSUES: Right labial abscess containing multiple foci of air   tracts superiorly along the anterior subcutaneous fat, measuring 4 x 4 x   6 cm. (Refer to series 7 image 46). No evidence of a liquid drainable   collection.  Degenerative changes of the spine, sacroiliac joints, and hips. Right   breast retroareolar coarse calcifications, compatible with sequela of   prior surgery.  OTHER: Mixed plaque within a tortuous aorta.  Atherosclerotic vascular   calcifications of the coronary arteries.  IMPRESSION:      Right labial abscess containing multiple foci of air measuring 4 x 4 x 6   cm.this may reflect underlying necrosis and may also reflect sequela of   prior drainage. Abscess tracks superiorly along anterior subcutaneous   fat. No liquid drainable collection.    < end of copied text >

## 2019-03-19 NOTE — PROGRESS NOTE ADULT - SUBJECTIVE AND OBJECTIVE BOX
BENITEZ FOX  89y  Female    PGY1 Note:    Patient seen at bedside. Resting comfortably in bed and tolerating breakfast. No acute overnight events. Patient continues to complain of pain at the site of the abscess with palpation, but reports some improvement following the drainage. Patient also states that the abscess is draining less fluid compared to yesterday. No fevers or new onset swelling/ erythema.     MEDICATIONS  (STANDING):  apixaban 2.5 milliGRAM(s) Oral every 12 hours  aztreonam  IVPB 2000 milliGRAM(s) IV Intermittent every 12 hours  clopidogrel Tablet 75 milliGRAM(s) Oral daily  docusate sodium 100 milliGRAM(s) Oral daily  lactated ringers. 1000 milliLiter(s) (125 mL/Hr) IV Continuous <Continuous>  lactated ringers. 1000 milliLiter(s) (125 mL/Hr) IV Continuous <Continuous>  metroNIDAZOLE  IVPB 500 milliGRAM(s) IV Intermittent every 8 hours  ondansetron Injectable 4 milliGRAM(s) IV Push once  pantoprazole  Injectable 40 milliGRAM(s) IV Push daily  vancomycin  IVPB 1000 milliGRAM(s) IV Intermittent every 12 hours    MEDICATIONS  (PRN):  acetaminophen   Tablet .. 650 milliGRAM(s) Oral every 6 hours PRN Temp greater or equal to 38C (100.4F)  ibuprofen  Tablet. 600 milliGRAM(s) Oral every 6 hours PRN Moderate Pain (4 - 6)  morphine  - Injectable 4 milliGRAM(s) IV Push every 4 hours PRN Severe Pain (7 - 10)      PAST MEDICAL & SURGICAL HISTORY:  Atrial fibrillation  Stented coronary artery  Cataract  Breast cancer  S/P lumpectomy, right breast  H/O total knee replacement, left      Physical Exam  Vital Signs Last 24 Hrs  T(C): 36.9 (19 Mar 2019 05:22), Max: 36.9 (19 Mar 2019 05:22)  T(F): 98.4 (19 Mar 2019 05:22), Max: 98.4 (19 Mar 2019 05:22)  HR: 77 (19 Mar 2019 05:22) (71 - 80)  BP: 135/60 (19 Mar 2019 05:22) (124/64 - 135/60)  RR: 18 (19 Mar 2019 05:22) (18 - 20)  SpO2: 97% (18 Mar 2019 23:38) (94% - 97%)    Gen: AAOx3, NAD  CV: RRR. No murmors gallops or rubs.  Pulm: CTAB. No wheezes or rales.  Ext: No calf tenderness, no swelling. SCDs in place  Abd: Soft, nontender, nondistended, BS+  Pelvis: Right labial abscess significantly improved, about 40-50% reduction in size compared to yesterday. Improved drainage of purulent fluid.                   11.8   14.60 )-----------( 248      ( 19 Mar 2019 07:46 )             35.0                         13.6   19.04 )-----------( 283      ( 18 Mar 2019 15:30 )             40.9

## 2019-03-19 NOTE — CONSULT NOTE ADULT - SUBJECTIVE AND OBJECTIVE BOX
Patient is a 89y old  Female who presents with a chief complaint of     HPI:  89yo , with right labial pain and swelling since Monday. She presented to her Gynecologist on Tuesday, Dr. Prather, who subsequently performed and L+D, and sent her home with Doxycycline BID for 7 days. She reports fevers of 100.7 that evening, which resolved the next day. She states since Tuesday, her right ford has been draining blood and dark fluid, foul smelling, and is extremely painful, 10/10. She stopped taking the doxycycline on Wednesday, secondary to dizziness, nausea, vomiting, prompting a fall around 0400 on Wednesday. She denies head trauma, and bruised her knees and wrists. She reports since then, the swelling has become larger and more painful at rest. Denies difficulty with urination, bowel movements, sexual activity, CP, SOB, palpitations, abdominal pain, vaginal pain. Reports she waited until this Monday to present to the ED secondary to having family in town.   Ob: P2, NSVDX2 at [facility].   Gyn: Denies history of abnormal papsmears, STIs, uterine fibroids or ovarian cysts. Reports history of breast cancer in , s/p lumpectomy and radiation. No history of pelvic abscess or cysts. Not sexually active. (18 Mar 2019 17:11)      PAST MEDICAL & SURGICAL HISTORY:  Atrial fibrillation  Stented coronary artery  Cataract  Breast cancer  S/P lumpectomy, right breast  H/O total knee replacement, left      PREVIOUS DIAGNOSTIC TESTING:      ECHO  FINDINGS:    STRESS  FINDINGS:    CATHETERIZATION  FINDINGS:    MEDICATIONS  (STANDING):  ATENolol  Tablet 25 milliGRAM(s) Oral daily  atorvastatin 40 milliGRAM(s) Oral at bedtime  aztreonam  IVPB 2000 milliGRAM(s) IV Intermittent every 12 hours  clopidogrel Tablet 75 milliGRAM(s) Oral daily  dextrose 5%. 1000 milliLiter(s) (50 mL/Hr) IV Continuous <Continuous>  dextrose 50% Injectable 12.5 Gram(s) IV Push once  dextrose 50% Injectable 25 Gram(s) IV Push once  dextrose 50% Injectable 25 Gram(s) IV Push once  docusate sodium 100 milliGRAM(s) Oral daily  escitalopram 10 milliGRAM(s) Oral daily  lactated ringers. 1000 milliLiter(s) (125 mL/Hr) IV Continuous <Continuous>  lactated ringers. 1000 milliLiter(s) (125 mL/Hr) IV Continuous <Continuous>  losartan 50 milliGRAM(s) Oral daily  metroNIDAZOLE  IVPB 500 milliGRAM(s) IV Intermittent every 8 hours  ondansetron Injectable 4 milliGRAM(s) IV Push once  pantoprazole  Injectable 40 milliGRAM(s) IV Push daily  vancomycin  IVPB 1000 milliGRAM(s) IV Intermittent every 12 hours    MEDICATIONS  (PRN):  acetaminophen   Tablet .. 650 milliGRAM(s) Oral every 6 hours PRN Temp greater or equal to 38C (100.4F)  dextrose 40% Gel 15 Gram(s) Oral once PRN Blood Glucose LESS THAN 70 milliGRAM(s)/deciliter  glucagon  Injectable 1 milliGRAM(s) IntraMuscular once PRN Glucose LESS THAN 70 milligrams/deciliter  ibuprofen  Tablet. 600 milliGRAM(s) Oral every 6 hours PRN Moderate Pain (4 - 6)  morphine  - Injectable 4 milliGRAM(s) IV Push every 4 hours PRN Severe Pain (7 - 10)      FAMILY HISTORY:  No pertinent family history in first degree relatives      SOCIAL HISTORY:  CIGARETTES:    ALCOHOL:    REVIEW OF SYSTEMS:  CONSTITUTIONAL: No fever, weight loss, or fatigue  NECK: No pain or stiffness  RESPIRATORY: No cough, wheezing, chills or hemoptysis; No shortness of breath  CARDIOVASCULAR: No chest pain, palpitations, dizziness, or leg swelling  GASTROINTESTINAL: No abdominal or epigastric pain. No nausea, vomiting, or hematemesis; No diarrhea or constipation. No melena or hematochezia.  GENITOURINARY: No dysuria, frequency, hematuria, or incontinence  NEUROLOGICAL: No headaches, memory loss, loss of strength, numbness, or tremors  SKIN: No itching, burning, rashes, or lesions   ENDOCRINE: No heat or cold intolerance; No hair loss  MUSCULOSKELETAL: No joint pain or swelling; No muscle, back, or extremity pain  HEME/LYMPH: No easy bruising, or bleeding gums          Vital Signs Last 24 Hrs  T(C): 36.9 (19 Mar 2019 05:22), Max: 36.9 (19 Mar 2019 05:22)  T(F): 98.4 (19 Mar 2019 05:22), Max: 98.4 (19 Mar 2019 05:22)  HR: 77 (19 Mar 2019 05:22) (71 - 80)  BP: 135/60 (19 Mar 2019 05:22) (124/64 - 135/60)  BP(mean): --  RR: 18 (19 Mar 2019 05:22) (18 - 20)  SpO2: 97% (18 Mar 2019 23:38) (94% - 97%)        PHYSICAL EXAM:  GENERAL: NAD, well-groomed, well-developed  HEAD:  Atraumatic, Normocephalic  NECK: Supple, No JVD, Normal thyroid  NERVOUS SYSTEM:  Alert & Oriented X3, Good concentration  CHEST/LUNG: Clear to percussion bilaterally; No rales, rhonchi, wheezing, or rubs  HEART: Regular rate and rhythm; No murmurs, rubs, or gallops  ABDOMEN: Soft, Nontender, Nondistended; Bowel sounds present  EXTREMITIES:  2+ Peripheral Pulses, No clubbing, cyanosis, or edema  SKIN: No rashes or lesions    INTERPRETATION OF TELEMETRY:    ECG:    I&O's Detail    19 Mar 2019 07:  -  19 Mar 2019 11:58  --------------------------------------------------------  IN:    lactated ringers.: 125 mL  Total IN: 125 mL    OUT:  Total OUT: 0 mL    Total NET: 125 mL          LABS:                        11.8   14.60 )-----------( 248      ( 19 Mar 2019 07:46 )             35.0     03-19    126<L>  |  91<L>  |  49<H>  ----------------------------<  493<HH>  3.1<L>   |  23  |  0.9    Ca    8.1<L>      19 Mar 2019 07:46    TPro  5.0<L>  /  Alb  2.6<L>  /  TBili  0.5  /  DBili  x   /  AST  31  /  ALT  34  /  AlkPhos  111  -19        PT/INR - ( 18 Mar 2019 15:30 )   PT: 16.80 sec;   INR: 1.47 ratio         PTT - ( 18 Mar 2019 15:30 )  PTT:31.4 sec    I&O's Summary    19 Mar 2019 07:01  -  19 Mar 2019 11:58  --------------------------------------------------------  IN: 125 mL / OUT: 0 mL / NET: 125 mL        RADIOLOGY & ADDITIONAL STUDIES:

## 2019-03-20 ENCOUNTER — RESULT REVIEW (OUTPATIENT)
Age: 84
End: 2019-03-20

## 2019-03-20 LAB
ALBUMIN SERPL ELPH-MCNC: 2.8 G/DL — LOW (ref 3.5–5.2)
ALP SERPL-CCNC: 106 U/L — SIGNIFICANT CHANGE UP (ref 30–115)
ALT FLD-CCNC: 28 U/L — SIGNIFICANT CHANGE UP (ref 0–41)
ANION GAP SERPL CALC-SCNC: 12 MMOL/L — SIGNIFICANT CHANGE UP (ref 7–14)
ANION GAP SERPL CALC-SCNC: 14 MMOL/L — SIGNIFICANT CHANGE UP (ref 7–14)
APTT BLD: 28.5 SEC — SIGNIFICANT CHANGE UP (ref 27–39.2)
AST SERPL-CCNC: 18 U/L — SIGNIFICANT CHANGE UP (ref 0–41)
BASOPHILS # BLD AUTO: 0.1 K/UL — SIGNIFICANT CHANGE UP (ref 0–0.2)
BASOPHILS NFR BLD AUTO: 0.4 % — SIGNIFICANT CHANGE UP (ref 0–1)
BILIRUB SERPL-MCNC: 0.8 MG/DL — SIGNIFICANT CHANGE UP (ref 0.2–1.2)
BUN SERPL-MCNC: 33 MG/DL — HIGH (ref 10–20)
BUN SERPL-MCNC: 33 MG/DL — HIGH (ref 10–20)
CALCIUM SERPL-MCNC: 8.4 MG/DL — LOW (ref 8.5–10.1)
CALCIUM SERPL-MCNC: 8.4 MG/DL — LOW (ref 8.5–10.1)
CHLORIDE SERPL-SCNC: 103 MMOL/L — SIGNIFICANT CHANGE UP (ref 98–110)
CHLORIDE SERPL-SCNC: 99 MMOL/L — SIGNIFICANT CHANGE UP (ref 98–110)
CO2 SERPL-SCNC: 24 MMOL/L — SIGNIFICANT CHANGE UP (ref 17–32)
CO2 SERPL-SCNC: 24 MMOL/L — SIGNIFICANT CHANGE UP (ref 17–32)
CREAT SERPL-MCNC: 0.9 MG/DL — SIGNIFICANT CHANGE UP (ref 0.7–1.5)
CREAT SERPL-MCNC: 1 MG/DL — SIGNIFICANT CHANGE UP (ref 0.7–1.5)
CULTURE RESULTS: SIGNIFICANT CHANGE UP
EOSINOPHIL # BLD AUTO: 0.12 K/UL — SIGNIFICANT CHANGE UP (ref 0–0.7)
EOSINOPHIL NFR BLD AUTO: 0.5 % — SIGNIFICANT CHANGE UP (ref 0–8)
GLUCOSE BLDC GLUCOMTR-MCNC: 112 MG/DL — HIGH (ref 70–99)
GLUCOSE BLDC GLUCOMTR-MCNC: 122 MG/DL — HIGH (ref 70–99)
GLUCOSE BLDC GLUCOMTR-MCNC: 156 MG/DL — HIGH (ref 70–99)
GLUCOSE SERPL-MCNC: 140 MG/DL — HIGH (ref 70–99)
GLUCOSE SERPL-MCNC: 221 MG/DL — HIGH (ref 70–99)
HCT VFR BLD CALC: 25.5 % — LOW (ref 37–47)
HCT VFR BLD CALC: 34.5 % — LOW (ref 37–47)
HCT VFR BLD CALC: 36.8 % — LOW (ref 37–47)
HGB BLD-MCNC: 11.4 G/DL — LOW (ref 12–16)
HGB BLD-MCNC: 12.3 G/DL — SIGNIFICANT CHANGE UP (ref 12–16)
HGB BLD-MCNC: 7.3 G/DL — CRITICAL LOW (ref 12–16)
HIV 1 & 2 AB SERPL IA.RAPID: SIGNIFICANT CHANGE UP
IMM GRANULOCYTES NFR BLD AUTO: 4.1 % — HIGH (ref 0.1–0.3)
INR BLD: 1.35 RATIO — HIGH (ref 0.65–1.3)
LYMPHOCYTES # BLD AUTO: 1.54 K/UL — SIGNIFICANT CHANGE UP (ref 1.2–3.4)
LYMPHOCYTES # BLD AUTO: 6.5 % — LOW (ref 20.5–51.1)
MAGNESIUM SERPL-MCNC: 0.7 MG/DL — LOW (ref 1.8–2.4)
MAGNESIUM SERPL-MCNC: 2 MG/DL — SIGNIFICANT CHANGE UP (ref 1.8–2.4)
MAGNESIUM SERPL-MCNC: 2.2 MG/DL — SIGNIFICANT CHANGE UP (ref 1.8–2.4)
MCHC RBC-ENTMCNC: 28.6 G/DL — LOW (ref 32–37)
MCHC RBC-ENTMCNC: 30.4 PG — SIGNIFICANT CHANGE UP (ref 27–31)
MCHC RBC-ENTMCNC: 30.7 PG — SIGNIFICANT CHANGE UP (ref 27–31)
MCHC RBC-ENTMCNC: 31.1 PG — HIGH (ref 27–31)
MCHC RBC-ENTMCNC: 33 G/DL — SIGNIFICANT CHANGE UP (ref 32–37)
MCHC RBC-ENTMCNC: 33.4 G/DL — SIGNIFICANT CHANGE UP (ref 32–37)
MCV RBC AUTO: 107.1 FL — HIGH (ref 81–99)
MCV RBC AUTO: 91.1 FL — SIGNIFICANT CHANGE UP (ref 81–99)
MCV RBC AUTO: 94 FL — SIGNIFICANT CHANGE UP (ref 81–99)
MONOCYTES # BLD AUTO: 1.67 K/UL — HIGH (ref 0.1–0.6)
MONOCYTES NFR BLD AUTO: 7 % — SIGNIFICANT CHANGE UP (ref 1.7–9.3)
NEUTROPHILS # BLD AUTO: 19.42 K/UL — HIGH (ref 1.4–6.5)
NEUTROPHILS NFR BLD AUTO: 81.5 % — HIGH (ref 42.2–75.2)
NRBC # BLD: 0 /100 WBCS — SIGNIFICANT CHANGE UP (ref 0–0)
PHOSPHATE SERPL-MCNC: 1.5 MG/DL — LOW (ref 2.1–4.9)
PHOSPHATE SERPL-MCNC: 4 MG/DL — SIGNIFICANT CHANGE UP (ref 2.1–4.9)
PLATELET # BLD AUTO: 150 K/UL — SIGNIFICANT CHANGE UP (ref 130–400)
PLATELET # BLD AUTO: 255 K/UL — SIGNIFICANT CHANGE UP (ref 130–400)
PLATELET # BLD AUTO: 258 K/UL — SIGNIFICANT CHANGE UP (ref 130–400)
POTASSIUM SERPL-MCNC: 3.1 MMOL/L — LOW (ref 3.5–5)
POTASSIUM SERPL-MCNC: 3.4 MMOL/L — LOW (ref 3.5–5)
POTASSIUM SERPL-SCNC: 3.1 MMOL/L — LOW (ref 3.5–5)
POTASSIUM SERPL-SCNC: 3.4 MMOL/L — LOW (ref 3.5–5)
PROT SERPL-MCNC: 5.1 G/DL — LOW (ref 6–8)
PROTHROM AB SERPL-ACNC: 15.5 SEC — HIGH (ref 9.95–12.87)
RBC # BLD: 2.38 M/UL — LOW (ref 4.2–5.4)
RBC # BLD: 3.67 M/UL — LOW (ref 4.2–5.4)
RBC # BLD: 4.04 M/UL — LOW (ref 4.2–5.4)
RBC # FLD: 14.5 % — SIGNIFICANT CHANGE UP (ref 11.5–14.5)
RBC # FLD: 14.6 % — HIGH (ref 11.5–14.5)
RBC # FLD: 15.9 % — HIGH (ref 11.5–14.5)
SODIUM SERPL-SCNC: 137 MMOL/L — SIGNIFICANT CHANGE UP (ref 135–146)
SODIUM SERPL-SCNC: 139 MMOL/L — SIGNIFICANT CHANGE UP (ref 135–146)
SPECIMEN SOURCE: SIGNIFICANT CHANGE UP
WBC # BLD: 16.64 K/UL — HIGH (ref 4.8–10.8)
WBC # BLD: 23.82 K/UL — HIGH (ref 4.8–10.8)
WBC # BLD: 27.12 K/UL — HIGH (ref 4.8–10.8)
WBC # FLD AUTO: 16.64 K/UL — HIGH (ref 4.8–10.8)
WBC # FLD AUTO: 23.82 K/UL — HIGH (ref 4.8–10.8)
WBC # FLD AUTO: 27.12 K/UL — HIGH (ref 4.8–10.8)

## 2019-03-20 PROCEDURE — 99291 CRITICAL CARE FIRST HOUR: CPT

## 2019-03-20 RX ORDER — VANCOMYCIN HCL 1 G
1000 VIAL (EA) INTRAVENOUS EVERY 12 HOURS
Qty: 0 | Refills: 0 | Status: DISCONTINUED | OUTPATIENT
Start: 2019-03-20 | End: 2019-03-21

## 2019-03-20 RX ORDER — METRONIDAZOLE 500 MG
500 TABLET ORAL EVERY 8 HOURS
Qty: 0 | Refills: 0 | Status: DISCONTINUED | OUTPATIENT
Start: 2019-03-20 | End: 2019-03-21

## 2019-03-20 RX ORDER — MEPERIDINE HYDROCHLORIDE 50 MG/ML
12.5 INJECTION INTRAMUSCULAR; INTRAVENOUS; SUBCUTANEOUS ONCE
Qty: 0 | Refills: 0 | Status: DISCONTINUED | OUTPATIENT
Start: 2019-03-20 | End: 2019-03-20

## 2019-03-20 RX ORDER — SODIUM CHLORIDE 9 MG/ML
1000 INJECTION, SOLUTION INTRAVENOUS
Qty: 0 | Refills: 0 | Status: DISCONTINUED | OUTPATIENT
Start: 2019-03-20 | End: 2019-03-21

## 2019-03-20 RX ORDER — APIXABAN 2.5 MG/1
2.5 TABLET, FILM COATED ORAL EVERY 12 HOURS
Qty: 0 | Refills: 0 | Status: DISCONTINUED | OUTPATIENT
Start: 2019-03-20 | End: 2019-03-20

## 2019-03-20 RX ORDER — LOSARTAN POTASSIUM 100 MG/1
50 TABLET, FILM COATED ORAL DAILY
Qty: 0 | Refills: 0 | Status: DISCONTINUED | OUTPATIENT
Start: 2019-03-20 | End: 2019-03-25

## 2019-03-20 RX ORDER — ATORVASTATIN CALCIUM 80 MG/1
40 TABLET, FILM COATED ORAL AT BEDTIME
Qty: 0 | Refills: 0 | Status: DISCONTINUED | OUTPATIENT
Start: 2019-03-20 | End: 2019-03-25

## 2019-03-20 RX ORDER — DEXAMETHASONE 0.5 MG/5ML
4 ELIXIR ORAL ONCE
Qty: 0 | Refills: 0 | Status: DISCONTINUED | OUTPATIENT
Start: 2019-03-20 | End: 2019-03-20

## 2019-03-20 RX ORDER — HEPARIN SODIUM 5000 [USP'U]/ML
5000 INJECTION INTRAVENOUS; SUBCUTANEOUS EVERY 8 HOURS
Qty: 0 | Refills: 0 | Status: DISCONTINUED | OUTPATIENT
Start: 2019-03-20 | End: 2019-03-22

## 2019-03-20 RX ORDER — IBUPROFEN 200 MG
600 TABLET ORAL EVERY 6 HOURS
Qty: 0 | Refills: 0 | Status: DISCONTINUED | OUTPATIENT
Start: 2019-03-20 | End: 2019-03-20

## 2019-03-20 RX ORDER — IBUPROFEN 200 MG
600 TABLET ORAL EVERY 8 HOURS
Qty: 0 | Refills: 0 | Status: DISCONTINUED | OUTPATIENT
Start: 2019-03-20 | End: 2019-03-25

## 2019-03-20 RX ORDER — ATENOLOL 25 MG/1
25 TABLET ORAL DAILY
Qty: 0 | Refills: 0 | Status: DISCONTINUED | OUTPATIENT
Start: 2019-03-20 | End: 2019-03-25

## 2019-03-20 RX ORDER — POTASSIUM CHLORIDE 20 MEQ
20 PACKET (EA) ORAL
Qty: 0 | Refills: 0 | Status: COMPLETED | OUTPATIENT
Start: 2019-03-20 | End: 2019-03-21

## 2019-03-20 RX ORDER — HYDROMORPHONE HYDROCHLORIDE 2 MG/ML
0.25 INJECTION INTRAMUSCULAR; INTRAVENOUS; SUBCUTANEOUS
Qty: 0 | Refills: 0 | Status: DISCONTINUED | OUTPATIENT
Start: 2019-03-20 | End: 2019-03-20

## 2019-03-20 RX ORDER — SODIUM CHLORIDE 9 MG/ML
1000 INJECTION, SOLUTION INTRAVENOUS
Qty: 0 | Refills: 0 | Status: DISCONTINUED | OUTPATIENT
Start: 2019-03-20 | End: 2019-03-20

## 2019-03-20 RX ORDER — ACETAMINOPHEN 500 MG
650 TABLET ORAL EVERY 6 HOURS
Qty: 0 | Refills: 0 | Status: DISCONTINUED | OUTPATIENT
Start: 2019-03-20 | End: 2019-03-25

## 2019-03-20 RX ORDER — ESCITALOPRAM OXALATE 10 MG/1
10 TABLET, FILM COATED ORAL DAILY
Qty: 0 | Refills: 0 | Status: DISCONTINUED | OUTPATIENT
Start: 2019-03-20 | End: 2019-03-25

## 2019-03-20 RX ORDER — MORPHINE SULFATE 50 MG/1
1 CAPSULE, EXTENDED RELEASE ORAL EVERY 6 HOURS
Qty: 0 | Refills: 0 | Status: DISCONTINUED | OUTPATIENT
Start: 2019-03-20 | End: 2019-03-21

## 2019-03-20 RX ORDER — MORPHINE SULFATE 50 MG/1
2 CAPSULE, EXTENDED RELEASE ORAL EVERY 6 HOURS
Qty: 0 | Refills: 0 | Status: DISCONTINUED | OUTPATIENT
Start: 2019-03-20 | End: 2019-03-20

## 2019-03-20 RX ORDER — MORPHINE SULFATE 50 MG/1
2 CAPSULE, EXTENDED RELEASE ORAL ONCE
Qty: 0 | Refills: 0 | Status: DISCONTINUED | OUTPATIENT
Start: 2019-03-20 | End: 2019-03-20

## 2019-03-20 RX ORDER — ALBUTEROL 90 UG/1
2.5 AEROSOL, METERED ORAL ONCE
Qty: 0 | Refills: 0 | Status: COMPLETED | OUTPATIENT
Start: 2019-03-20 | End: 2019-03-20

## 2019-03-20 RX ORDER — CLOPIDOGREL BISULFATE 75 MG/1
75 TABLET, FILM COATED ORAL DAILY
Qty: 0 | Refills: 0 | Status: DISCONTINUED | OUTPATIENT
Start: 2019-03-20 | End: 2019-03-25

## 2019-03-20 RX ORDER — ONDANSETRON 8 MG/1
4 TABLET, FILM COATED ORAL ONCE
Qty: 0 | Refills: 0 | Status: DISCONTINUED | OUTPATIENT
Start: 2019-03-20 | End: 2019-03-20

## 2019-03-20 RX ORDER — AZTREONAM 2 G
2000 VIAL (EA) INJECTION EVERY 12 HOURS
Qty: 0 | Refills: 0 | Status: DISCONTINUED | OUTPATIENT
Start: 2019-03-20 | End: 2019-03-21

## 2019-03-20 RX ADMIN — ATENOLOL 25 MILLIGRAM(S): 25 TABLET ORAL at 05:05

## 2019-03-20 RX ADMIN — Medication 100 MILLIGRAM(S): at 05:02

## 2019-03-20 RX ADMIN — SODIUM CHLORIDE 90 MILLILITER(S): 9 INJECTION, SOLUTION INTRAVENOUS at 17:00

## 2019-03-20 RX ADMIN — Medication 250 MILLIGRAM(S): at 20:12

## 2019-03-20 RX ADMIN — SODIUM CHLORIDE 75 MILLILITER(S): 9 INJECTION, SOLUTION INTRAVENOUS at 18:59

## 2019-03-20 RX ADMIN — ESCITALOPRAM OXALATE 10 MILLIGRAM(S): 10 TABLET, FILM COATED ORAL at 12:11

## 2019-03-20 RX ADMIN — SODIUM CHLORIDE 100 MILLILITER(S): 9 INJECTION, SOLUTION INTRAVENOUS at 12:12

## 2019-03-20 RX ADMIN — APIXABAN 2.5 MILLIGRAM(S): 2.5 TABLET, FILM COATED ORAL at 19:04

## 2019-03-20 RX ADMIN — LOSARTAN POTASSIUM 50 MILLIGRAM(S): 100 TABLET, FILM COATED ORAL at 05:05

## 2019-03-20 RX ADMIN — Medication 250 MILLIGRAM(S): at 05:02

## 2019-03-20 RX ADMIN — Medication 100 MILLIGRAM(S): at 05:03

## 2019-03-20 RX ADMIN — ALBUTEROL 2.5 MILLIGRAM(S): 90 AEROSOL, METERED ORAL at 17:05

## 2019-03-20 RX ADMIN — Medication 50 GRAM(S): at 05:04

## 2019-03-20 RX ADMIN — Medication 100 MILLIGRAM(S): at 19:00

## 2019-03-20 RX ADMIN — Medication 100 MILLIGRAM(S): at 12:11

## 2019-03-20 RX ADMIN — Medication 100 MILLIGRAM(S): at 21:59

## 2019-03-20 RX ADMIN — ATORVASTATIN CALCIUM 40 MILLIGRAM(S): 80 TABLET, FILM COATED ORAL at 21:59

## 2019-03-20 RX ADMIN — PANTOPRAZOLE SODIUM 40 MILLIGRAM(S): 20 TABLET, DELAYED RELEASE ORAL at 12:12

## 2019-03-20 NOTE — CONSULT NOTE ADULT - SUBJECTIVE AND OBJECTIVE BOX
SICU Consultation Note  =====================================================  HPI:  89yo F with PMHx of HTN, HLD, CAD, prior PCI/stents (on Plavix), AFib (on Eliquis), h/o breast Ca'92, s/p right breast lumpectomy and radiation, who has been experiencing right labial pain and swelling since 3/11, went to her Gynecologist on 3/12, who subsequently performed an I+D, was sent home on Doxycycline BID for 7 days, which she stopped taking after 2 days due to dizziness, nausea, vomiting, prompting a fall on 3/13 (bruised her knees and wrists).  Pt developed worsening right labia pain, swelling with drainage of bloody dark foul smelling fluid.  Pt presented to ED on 3/18, underwent a bedside drainage of right labia abscess, started on Vanco, Aztreonam and Flagyl, and waited for cardiac optimization.  She was deemed to be moderate risk by cardiology.  Today she went to the OR, intra-op found to have Right labium majus abscess approximately 15cm extending up to mons, 4cm deep with foul-smelling purulent drainage, abscess was spontaneously draining at inferior porion. Foul-smelling necrotic tissue was noted and sharply debrided.  ICU was called due to multiple comorbidities and for close hemodynamic monitoring.      Surgery Information  OR time: 2hrs     EBL:  150     UO:  700           IV Fluids:  1.3L     Blood Products:    none         PAST MEDICAL & SURGICAL HISTORY:  Atrial fibrillation  Stented coronary artery  Cataract  Breast cancer  S/P lumpectomy, right breast  H/O total knee replacement, left  HTN, HLD    Allergies  Levaquin (Unknown)  penicillin (Hives)  sulfa drugs (Hives)    Home Medications:  atenolol 25 mg oral tablet: 1 tab(s) orally once a day (18 Mar 2019 13:49)  atorvastatin 40 mg oral tablet: 1 tab(s) orally once a day (18 Mar 2019 13:49)  candesartan 16 mg oral tablet: 1 tab(s) orally once a day (18 Mar 2019 13:49)  chlorthalidone 25 mg oral tablet: 1 tab(s) orally once a day (18 Mar 2019 13:49)  Eliquis 2.5 mg oral tablet: 1 tab(s) orally 2 times a day (18 Mar 2019 13:49)  Lexapro 10 mg oral tablet: 1 tab(s) orally once a day (18 Mar 2019 13:49)  Plavix 75 mg oral tablet: 1 tab(s) orally once a day (18 Mar 2019 13:49)    Advanced Directives: Full Code     ROS:  [ X] A ten-point review of systems was otherwise negative except as noted.  [ ] Due to altered mental status/intubation, subjective information were not able to be obtained from the patient. History was obtained, to the extent possible, from review of the chart and collateral sources of information.      CURRENT MEDICATIONS:   --------------------------------------------------------------------------------------  Neurologic Medications  acetaminophen   Tablet .. 650 milliGRAM(s) Oral every 6 hours PRN Mild Pain (1 - 3)  escitalopram 10 milliGRAM(s) Oral daily  HYDROmorphone  Injectable 0.25 milliGRAM(s) IV Push every 15 minutes PRN Severe Pain (7 - 10)  ibuprofen  Tablet. 600 milliGRAM(s) Oral every 6 hours PRN Moderate Pain (4 - 6)  meperidine     Injectable 12.5 milliGRAM(s) IV Push once PRN Shivering  morphine  - Injectable 2 milliGRAM(s) IV Push once PRN Moderate Pain (4 - 6)  morphine  - Injectable 2 milliGRAM(s) IV Push every 6 hours PRN Severe Pain (7 - 10)  ondansetron Injectable 4 milliGRAM(s) IV Push once PRN Nausea and/or Vomiting    Respiratory Medications    Cardiovascular Medications  ATENolol  Tablet 25 milliGRAM(s) Oral daily  losartan 50 milliGRAM(s) Oral daily    Gastrointestinal Medications  lactated ringers. 1000 milliLiter(s) IV Continuous <Continuous>  lactated ringers. 1000 milliLiter(s) IV Continuous <Continuous>    Genitourinary Medications    Hematologic/Oncologic Medications  clopidogrel Tablet 75 milliGRAM(s) Oral daily    Antimicrobial/Immunologic Medications  aztreonam  IVPB 2000 milliGRAM(s) IV Intermittent every 12 hours  metroNIDAZOLE  IVPB 500 milliGRAM(s) IV Intermittent every 8 hours  vancomycin  IVPB 1000 milliGRAM(s) IV Intermittent every 12 hours    Endocrine/Metabolic Medications  atorvastatin 40 milliGRAM(s) Oral at bedtime  dexamethasone  Injectable 4 milliGRAM(s) IV Push once PRN nausea, vomitting    Topical/Other Medications    --------------------------------------------------------------------------------------    Vital Signs Last 24 Hrs  T(C): 37.1 (20 Mar 2019 19:30), Max: 37.6 (20 Mar 2019 04:43)  T(F): 98.8 (20 Mar 2019 19:30), Max: 99.6 (20 Mar 2019 04:43)  HR: 84 (20 Mar 2019 22:00) (77 - 112)  BP: 114/63 (20 Mar 2019 22:00) (93/66 - 151/71)  BP(mean): 83 (20 Mar 2019 22:00) (83 - 83)  RR: 18 (20 Mar 2019 22:00) (17 - 32)  SpO2: 94% (20 Mar 2019 22:00) (87% - 95%)  I&O's Detail    19 Mar 2019 07:01  -  20 Mar 2019 07:00  --------------------------------------------------------  IN:    lactated ringers.: 500 mL    lactated ringers.: 75 mL  Total IN: 575 mL    OUT:  Total OUT: 0 mL    Total NET: 575 mL      20 Mar 2019 07:01  -  20 Mar 2019 23:02  --------------------------------------------------------  IN:    lactated ringers.: 270 mL    lactated ringers.: 225 mL    Oral Fluid: 100 mL  Total IN: 595 mL    OUT:    Indwelling Catheter - Urethral: 140 mL  Total OUT: 140 mL    Total NET: 455 mL      I&O's Summary    19 Mar 2019 07:01  -  20 Mar 2019 07:00  --------------------------------------------------------  IN: 575 mL / OUT: 0 mL / NET: 575 mL    20 Mar 2019 07:01  -  20 Mar 2019 23:02  --------------------------------------------------------  IN: 595 mL / OUT: 140 mL / NET: 455 mL        LABS:                          11.4   27.12 )-----------( 258      ( 20 Mar 2019 21:42 )             34.5     03-20    137  |  99  |  33<H>  ----------------------------<  221<H>  3.4<L>   |  24  |  1.0    Ca    8.4<L>      20 Mar 2019 21:42  Phos  4.0     03-20  Mg     2.0     03-20    TPro  5.1<L>  /  Alb  2.8<L>  /  TBili  0.8  /  DBili  x   /  AST  18  /  ALT  28  /  AlkPhos  106  03-20    LIVER FUNCTIONS - ( 20 Mar 2019 08:47 )  Alb: 2.8 g/dL / Pro: 5.1 g/dL / ALK PHOS: 106 U/L / ALT: 28 U/L / AST: 18 U/L / GGT: x           PT/INR - ( 20 Mar 2019 08:47 )   PT: 15.50 sec;   INR: 1.35 ratio    PTT - ( 20 Mar 2019 08:47 )  PTT:28.5 sec        EXAM:  General/Neuro  GCS: 15   Exam: Normal, NAD, alert, oriented x 3, no focal deficits. PERRLA, EOMI, WOLFF    Respiratory  Exam: Lungs clear to auscultation, Normal expansion/effort.      Cardiovascular  Exam: S1, S2.  Regular rate and rhythm.   Cardiac Rhythm: Normal Sinus Rhythm    GI  Exam: Abdomen soft, Non-tender, Non-distended.      Extremities  Exam: Extremities warm, well-perfused.  Extensive bruises noted over b/l knees    Derm:  Exam: Good skin turgor, no skin breakdown.      :   Exam: Suarez catheter in place.     Tubes/Lines/Drains  ***  [x] Peripheral IV        [X] Urinary Catheter		Date Placed: 3/20/19

## 2019-03-20 NOTE — BRIEF OPERATIVE NOTE - OPERATION/FINDINGS
Right labium majus abscess approximately 15cm extending up to mons, 4cm deep with foul-smelling purulent drainage. Abscess was spontaneously draining at inferior porion. Foul-smelling necrotic tissue was noted and sharply debrided. Right labium majus abscess approximately 20cm extending up to mons, 4cm deep with foul-smelling purulent drainage. Abscess was spontaneously draining at inferior portion through 2 orifices. Skin incision about 15 cm from caudal labia majora to mons pubis. Foul-smelling necrotic tissue was noted and sharply debrided. Wound packed with kerlex with gentamicin ointment.

## 2019-03-20 NOTE — BRIEF OPERATIVE NOTE - NSICDXBRIEFPROCEDURE_GEN_ALL_CORE_FT
PROCEDURES:  Wound debridement 20-Mar-2019 16:32:18  Mckayla Morris  Incision and drainage, vulva 20-Mar-2019 16:31:24  Mckayla Morris

## 2019-03-20 NOTE — PROGRESS NOTE ADULT - ASSESSMENT
89yo P2, h/o CAD with stent x3, a-fib, HTN, HLD, Breast CA in remission, s/p I&D and debridement of labial abscess extending to anterior mons possible necrotizing fascitis, on IV antibiotics, leukocytosis, but afebrile, no signs of sepsis at this time  -Care transferred to SICU    -GYN to follow  - continue vanco/aztreonam/flagyl  - continue plavix per cardio but hold eliquis  -strict I/O  -f/u repeat labs  -f/u OR cultures  -f/u HB A1c and HIV  -monitor for signs of sepsis  -DVT/GI prophylaxis    Dr. Delaney aware

## 2019-03-20 NOTE — PROGRESS NOTE ADULT - ASSESSMENT
89yo P2, now hospital day 3, with right labial abscess, [id number] visualized on CT, with an elevated CBC count, now improved, and stable vital signs, afebrile at this time.   -continue vanco/aztreonam/flagyl  -continue plavix  - NPO since midnight for surgery  - OR today (3/20) for debridement and biopsy of abscess  - As per medicine, fluids changed from LR @ 75 to LR @ 100  - SCDs for DVT prophylaxis  - ambulate with assistance    Dr. Lomeli aware 89yo P2, now hospital day 3, with right labial abscess, [id number] visualized on CT, with an elevated CBC count, now improved, and stable vital signs, afebrile at this time.   - continue vanco/aztreonam/flagyl  - continue plavix per cardio  - NPO since midnight for surgery  - OR today (3/20) for I&D and debridement and biopsy of abscess  - will have burn consult for wound management after debridement  - As per medicine, fluids changed from LR @ 75 to LR @ 100  - SCDs for DVT prophylaxis  - ambulate with assistance    Dr. Lomeli and Dr. Delaney aware

## 2019-03-20 NOTE — PROGRESS NOTE ADULT - SUBJECTIVE AND OBJECTIVE BOX
PGY 3 note    Patient seen at bedside in PACU. Denies pain. No fever/chills, nausea/vomiting, diarrhea. Suarez in.     VS  ICU Vital Signs Last 24 Hrs  T(C): 37.1 (20 Mar 2019 19:30), Max: 37.6 (20 Mar 2019 04:43)  T(F): 98.8 (20 Mar 2019 19:30), Max: 99.6 (20 Mar 2019 04:43)  HR: 84 (20 Mar 2019 22:00) (77 - 112)  BP: 114/63 (20 Mar 2019 22:00) (93/66 - 151/71)  BP(mean): 83 (20 Mar 2019 22:00) (83 - 83)  ABP: --  ABP(mean): --  RR: 18 (20 Mar 2019 22:00) (17 - 32)  SpO2: 94% (20 Mar 2019 22:00) (87% - 95%)    GEN: NAD, sleeping comfortably, AAO x3, easily arousable  Heart:RRR  Lungs: CTAB  Abd: soft, NT, no r/g/r, +BS  External genitalia: dressing in place, clean and dry, no erythema, fluctuance in surrounding area. appropriately tender.  Ext: SCDs on, no calf tenderness or edema b/l, b/l bruising of knees, unchanged from admission      03-19-19 @ 07:01  -  03-20-19 @ 07:00  --------------------------------------------------------  IN: 575 mL / OUT: 0 mL / NET: 575 mL    03-20-19 @ 07:01  -  03-20-19 @ 23:03  --------------------------------------------------------  IN: 595 mL / OUT: 140 mL / NET: 455 mL          LABS:                        11.4   27.12 )-----------( 258      ( 20 Mar 2019 21:42 )             34.5                         7.3    16.64 )-----------( 150      ( 20 Mar 2019 20:40 )             25.5                         12.3   23.82 )-----------( 255      ( 20 Mar 2019 08:47 )             36.8                         11.8   14.60 )-----------( 248      ( 19 Mar 2019 07:46 )             35.0     Magnesium, Serum: tnp mg/dL (03-20 @ 20:40)  Magnesium, Serum: 2.2 mg/dL (03-20 @ 08:47)    03-20-19 @ 08:47      139  |  103  |  33<H>  ----------------------------<  140<H>  3.1<L>   |  24  |  0.9    03-19-19 @ 21:25      137  |  99  |  41<H>  ----------------------------<  118<H>  3.6   |  23  |  0.9    03-19-19 @ 07:46      126<L>  |  91<L>  |  49<H>  ----------------------------<  493<HH>  3.1<L>   |  23  |  0.9    03-18-19 @ 15:30      144  |  101  |  78<HH>  ----------------------------<  101<H>  3.8   |  27  |  1.2        Ca    8.4<L>      20 Mar 2019 08:47  Ca    8.9      19 Mar 2019 21:25  Ca    8.1<L>      19 Mar 2019 07:46  Ca    9.6      18 Mar 2019 15:30  Phos  tnp     03-20  Mg     tnp     03-20  Mg     2.2     03-20  Mg     1.7<L>     03-19    TPro  5.1<L>  /  Alb  2.8<L>  /  TBili  0.8  /  DBili  x   /  AST  18  /  ALT  28  /  AlkPhos  106  03-20-19 @ 08:47  TPro  5.0<L>  /  Alb  2.6<L>  /  TBili  0.5  /  DBili  x   /  AST  31  /  ALT  34  /  AlkPhos  111  03-19-19 @ 07:46  TPro  5.9<L>  /  Alb  3.4<L>  /  TBili  0.5  /  DBili  x   /  AST  45<H>  /  ALT  58<H>  /  AlkPhos  159<H>  03-18-19 @ 15:30    Culture - Genital (collected 03-18-19 @ 17:13)  Source: .Genital Vulva  Final Report (03-20-19 @ 18:21):    Few Routine vaginal kinsey      Medications  ALBUTerol    0.083%.   2.5 milliGRAM(s) Nebulizer (03-20-19 @ 17:05)    apixaban   2.5 milliGRAM(s) Oral (03-20-19 @ 19:04)    ATENolol  Tablet   25 milliGRAM(s) Oral (03-20-19 @ 05:05)    atorvastatin   40 milliGRAM(s) Oral (03-20-19 @ 21:59)    aztreonam  IVPB   100 mL/Hr IV Intermittent (03-20-19 @ 19:00)    aztreonam  IVPB   100 mL/Hr IV Intermittent (03-20-19 @ 05:03)    docusate sodium   100 milliGRAM(s) Oral (03-20-19 @ 12:11)    escitalopram   10 milliGRAM(s) Oral (03-20-19 @ 12:11)    lactated ringers.   90 mL/Hr IV Continuous (03-20-19 @ 16:55)    lactated ringers.   75 mL/Hr IV Continuous (03-20-19 @ 16:51)    lactated ringers.   100 mL/Hr IV Continuous (03-20-19 @ 06:15)    losartan   50 milliGRAM(s) Oral (03-20-19 @ 05:05)    magnesium sulfate  IVPB   50 mL/Hr IV Intermittent (03-20-19 @ 05:04)    metroNIDAZOLE  IVPB   100 mL/Hr IV Intermittent (03-20-19 @ 21:59)    metroNIDAZOLE  IVPB   100 mL/Hr IV Intermittent (03-20-19 @ 05:02)    pantoprazole  Injectable   40 milliGRAM(s) IV Push (03-20-19 @ 12:12)    vancomycin  IVPB   250 mL/Hr IV Intermittent (03-20-19 @ 05:02)    vancomycin  IVPB   250 mL/Hr IV Intermittent (03-20-19 @ 20:12)

## 2019-03-20 NOTE — PRE-ANESTHESIA EVALUATION ADULT - NSPROPOSEDPROCEDFT_GEN_ALL_CORE
exc of vulvar abscess vulvar mons pubis incision and drainage, debridement of involved adjacent tissue

## 2019-03-20 NOTE — CHART NOTE - NSCHARTNOTEFT_GEN_A_CORE
Anesthesia Post Op Assessment  		(    ) Intubated           TV _____	Rate _sv____	FiO2__FM 40%___  		(  x  ) Patent airway. Full return of protective reflexes  		(  x  )Full recovery from anesthesia/sedation to baseline status      Cardiovascular Function:  		BP:	123/66	                  Pulse:		99                  RR:		12                  Temp:		98.7                  O2Sat:                 96%      Mental Status:  	        (  x  ) awake		  ( x   ) alert		 (    ) drowsy	               (    ) sedated      Nausea/Vomiting:  		(    ) Yes, See post-op orders		   (  x  ) No      Pain Scale: (0-10):			Treatment:     (    ) None	            (  x  ) See Post-Op/PCA Orders      Post-operative Fluids: 	   (    ) Oral	          (  x  ) See post-op Orders        Comments:    Uneventful. No complications from anesthesia.  Proventil tx ordered for mild wheezing.  Discharge when criteria met.  Report given to ICU.

## 2019-03-20 NOTE — PROGRESS NOTE ADULT - SUBJECTIVE AND OBJECTIVE BOX
Patient seen at bedside, HD#3. She is s/p bedside drainage of R labial abscess (3/18). This morning patient is resting comfortably in bed. No acute overnight events. A suction catheter was placed yesterday afternoon, as the patient had some episodes of incontinence. Patient continues to complain of pain at the site, but states that she has had some improvement. The abscess continues to drain brown, foul smelling fluid with palpation. She denies any chest pain, diaphoresis, shortness of breath, or N/V/D. She is currently not able to ambulate 2/2 pain associated with the abscess. Patient has been NPO since midnight, and is scheduled to go to the OR today (3/20) for debridement and biopsy of the abscess. She has been cleared by medicine and cardiology at this time.     MEDICATIONS  (STANDING):  apixaban 2.5 milliGRAM(s) Oral every 12 hours  aztreonam  IVPB 2000 milliGRAM(s) IV Intermittent every 12 hours  clopidogrel Tablet 75 milliGRAM(s) Oral daily  docusate sodium 100 milliGRAM(s) Oral daily  lactated ringers. 1000 milliLiter(s) (125 mL/Hr) IV Continuous <Continuous>  lactated ringers. 1000 milliLiter(s) (125 mL/Hr) IV Continuous <Continuous>  metroNIDAZOLE  IVPB 500 milliGRAM(s) IV Intermittent every 8 hours  ondansetron Injectable 4 milliGRAM(s) IV Push once  pantoprazole  Injectable 40 milliGRAM(s) IV Push daily  vancomycin  IVPB 1000 milliGRAM(s) IV Intermittent every 12 hours    MEDICATIONS  (PRN):  acetaminophen   Tablet .. 650 milliGRAM(s) Oral every 6 hours PRN Temp greater or equal to 38C (100.4F)  ibuprofen  Tablet. 600 milliGRAM(s) Oral every 6 hours PRN Moderate Pain (4 - 6)  morphine  - Injectable 4 milliGRAM(s) IV Push every 4 hours PRN Severe Pain (7 - 10)      PAST MEDICAL & SURGICAL HISTORY:  Atrial fibrillation  Stented coronary artery  Cataract  Breast cancer  S/P lumpectomy, right breast  H/O total knee replacement, left      Physical Exam  Gen: AAOx3, NAD  CV: RRR. No murmors gallops or rubs.  Pulm: CTAB. No wheezes or rales.  Ext: No calf tenderness, no swelling. SCDs in place. IV site infiltrated on right arm.   Abd: Soft, nontender, nondistended, BS+  Pelvis:  Right labial swelling has decreased in size from about 4cm to 3cm, purulent drainage on mild palpation. No surrounding erythema.     Vital Signs Last 24 Hrs  T(C): 37.6 (20 Mar 2019 04:43), Max: 37.6 (20 Mar 2019 04:43)  T(F): 99.6 (20 Mar 2019 04:43), Max: 99.6 (20 Mar 2019 04:43)  HR: 90 (20 Mar 2019 04:43) (80 - 90)  BP: 151/71 (20 Mar 2019 04:43) (125/74 - 151/71)  RR: 18 (20 Mar 2019 04:43) (18 - 18)    Labs:               03-19    137  |  99  |  41<H>  ----------------------------<  118<H>  3.6   |  23  |  0.9    Ca    8.9      19 Mar 2019 21:25  Mg     1.7     03-19    TPro  5.0<L>  /  Alb  2.6<L>  /  TBili  0.5  /  DBili  x   /  AST  31  /  ALT  34  /  AlkPhos  111  03-19      < from: CT Abdomen and Pelvis w/ IV Cont (03.18.19 @ 21:18) >    PELVIC ORGANS: Distended urinary bladder.  PERITONEUM/MESENTERY/BOWEL: No obstruction. No pneumoperitoneum.   Diverticulosis of the sigmoid and descending colon..  BONES/SOFT TISSUES: Right labial abscess containing multiple foci of air   tracts superiorly along the anterior subcutaneous fat, measuring 4 x 4 x   6 cm. (Refer to series 7 image 46). No evidence of a liquid drainable   collection.  Degenerative changes of the spine, sacroiliac joints, and hips. Right   breast retroareolar coarse calcifications, compatible with sequela of   prior surgery.  OTHER: Mixed plaque within a tortuous aorta.  Atherosclerotic vascular   calcifications of the coronary arteries.  IMPRESSION:      Right labial abscess containing multiple foci of air measuring 4 x 4 x 6   cm.this may reflect underlying necrosis and may also reflect sequela of   prior drainage. Abscess tracks superiorly along anterior subcutaneous   fat. No liquid drainable collection.    < end of copied text >

## 2019-03-20 NOTE — CONSULT NOTE ADULT - ASSESSMENT
ASSESSMENT/PLAN: 89yo F with PMHx of CAD, prior PCI/stents (on Plavix), AFib (on Eliquis), h/o breast Ca'92, s/p right breast lumpectomy and radiation, who has been experiencing right labial pain and swelling since 3/11, s/p extensive I and D of foul smelling purulent and necrotic tissue of labia and vulva     Neurologic: pain control with tylenol and motrin prn, morphine 1mg q6h if needed.   Resumed Lexapro    Respiratory: on 3L NC wean as tolerated, encourage IS    Cardiovascular: keep normotensive, h/o HTN, HLD, AFib, CAD/PCI, continue Atenolol, Losartan, and Atorvastatin, back on Plavix.  Eliquis on hold for now due to possible further debridements, awaiting Burn c/s.  Follopw up 3 sets of CE.    Gastrointestinal/Nutrition: regular diet, PPI, LR @ 75/hr.  IVL once tolerating diet    Genitourinary/Renal: strict I and Os, replete KCL for hypokalemia, K3.4    Hematologic: h/h stable post-op, heparin sq started, full AC with Eliquis on hold for now, f/up if further debridements needed.    Infectious Disease: WBC 27 post-op, monitor fever curve, continue Vanco, Aztreonam and Flagyl for extensive labile infection    Endocrine: FS q6h if NPO and post-op    Disposition: SICU, possible transfer to burn service tomorrow.

## 2019-03-20 NOTE — PROGRESS NOTE ADULT - SUBJECTIVE AND OBJECTIVE BOX
PGY3 Note    Called to bedside by nurse who stated the pt was asymptomatic, however her O2 sats were 89% on RA, increased to 92-93% on 2L NC. At bedside, pt stable, daughter at bedside, per her, the pt looked the same, unchanged from baseline. The pt reports that she feels well, has no dyspnea or chest pain. Denies any complaints. On exam, Lungs CTAB, RRR, NL S1/S2. Pt consented for procedure with Dr. Delaney at bedside. Will encourage incentive spirometry and will hold plavix this morning and will give it after the OR.    Dr. Delaney at bedside

## 2019-03-20 NOTE — PROGRESS NOTE ADULT - ATTENDING COMMENTS
pt seen by cardio at that moment, still ok to proceed with OR  Spoke with daughter and pt about procedure, consent obtained. spoke about need for extensive debridement, unknown amount until assessment in the OR. That wound will most likely be allowed to heal by secondary intention, that very likely she will return to the OR for further debridement, unknown amount of times. that we will continue IV antibiotics and obtain ID consult. Will consult SICU/Burn ICU postop for postop care. Pt will be packed and stark will stay in. All questions answered. Per daughter, she is her health care proxy.

## 2019-03-21 LAB
ANION GAP SERPL CALC-SCNC: 13 MMOL/L — SIGNIFICANT CHANGE UP (ref 7–14)
APTT BLD: 27.7 SEC — SIGNIFICANT CHANGE UP (ref 27–39.2)
BASOPHILS # BLD AUTO: 0.05 K/UL — SIGNIFICANT CHANGE UP (ref 0–0.2)
BASOPHILS NFR BLD AUTO: 0.2 % — SIGNIFICANT CHANGE UP (ref 0–1)
BUN SERPL-MCNC: 35 MG/DL — HIGH (ref 10–20)
CALCIUM SERPL-MCNC: 8.2 MG/DL — LOW (ref 8.5–10.1)
CHLORIDE SERPL-SCNC: 103 MMOL/L — SIGNIFICANT CHANGE UP (ref 98–110)
CK MB CFR SERPL CALC: 6.4 NG/ML — HIGH (ref 0.6–6.3)
CK SERPL-CCNC: 240 U/L — HIGH (ref 0–225)
CO2 SERPL-SCNC: 26 MMOL/L — SIGNIFICANT CHANGE UP (ref 17–32)
CREAT SERPL-MCNC: <0.5 MG/DL — LOW (ref 0.7–1.5)
EOSINOPHIL # BLD AUTO: 0.55 K/UL — SIGNIFICANT CHANGE UP (ref 0–0.7)
EOSINOPHIL NFR BLD AUTO: 2.7 % — SIGNIFICANT CHANGE UP (ref 0–8)
ESTIMATED AVERAGE GLUCOSE: 128 MG/DL — HIGH (ref 68–114)
GLUCOSE SERPL-MCNC: 159 MG/DL — HIGH (ref 70–99)
HBA1C BLD-MCNC: 6.1 % — HIGH (ref 4–5.6)
HCT VFR BLD CALC: 30 % — LOW (ref 37–47)
HCT VFR BLD CALC: 32.1 % — LOW (ref 37–47)
HGB BLD-MCNC: 10 G/DL — LOW (ref 12–16)
HGB BLD-MCNC: 10.5 G/DL — LOW (ref 12–16)
IMM GRANULOCYTES NFR BLD AUTO: 4.7 % — HIGH (ref 0.1–0.3)
INR BLD: 1.66 RATIO — HIGH (ref 0.65–1.3)
LYMPHOCYTES # BLD AUTO: 10.5 % — LOW (ref 20.5–51.1)
LYMPHOCYTES # BLD AUTO: 2.17 K/UL — SIGNIFICANT CHANGE UP (ref 1.2–3.4)
MAGNESIUM SERPL-MCNC: 2.1 MG/DL — SIGNIFICANT CHANGE UP (ref 1.8–2.4)
MCHC RBC-ENTMCNC: 30.7 PG — SIGNIFICANT CHANGE UP (ref 27–31)
MCHC RBC-ENTMCNC: 31.2 PG — HIGH (ref 27–31)
MCHC RBC-ENTMCNC: 32.7 G/DL — SIGNIFICANT CHANGE UP (ref 32–37)
MCHC RBC-ENTMCNC: 33.3 G/DL — SIGNIFICANT CHANGE UP (ref 32–37)
MCV RBC AUTO: 93.5 FL — SIGNIFICANT CHANGE UP (ref 81–99)
MCV RBC AUTO: 93.9 FL — SIGNIFICANT CHANGE UP (ref 81–99)
MONOCYTES # BLD AUTO: 1.63 K/UL — HIGH (ref 0.1–0.6)
MONOCYTES NFR BLD AUTO: 7.9 % — SIGNIFICANT CHANGE UP (ref 1.7–9.3)
NEUTROPHILS # BLD AUTO: 15.24 K/UL — HIGH (ref 1.4–6.5)
NEUTROPHILS NFR BLD AUTO: 74 % — SIGNIFICANT CHANGE UP (ref 42.2–75.2)
NRBC # BLD: 0 /100 WBCS — SIGNIFICANT CHANGE UP (ref 0–0)
NRBC # BLD: 0 /100 WBCS — SIGNIFICANT CHANGE UP (ref 0–0)
PHOSPHATE SERPL-MCNC: 4.1 MG/DL — SIGNIFICANT CHANGE UP (ref 2.1–4.9)
PLATELET # BLD AUTO: 229 K/UL — SIGNIFICANT CHANGE UP (ref 130–400)
PLATELET # BLD AUTO: 242 K/UL — SIGNIFICANT CHANGE UP (ref 130–400)
POTASSIUM SERPL-MCNC: 4.5 MMOL/L — SIGNIFICANT CHANGE UP (ref 3.5–5)
POTASSIUM SERPL-SCNC: 4.5 MMOL/L — SIGNIFICANT CHANGE UP (ref 3.5–5)
PROTHROM AB SERPL-ACNC: 19 SEC — HIGH (ref 9.95–12.87)
RBC # BLD: 3.21 M/UL — LOW (ref 4.2–5.4)
RBC # BLD: 3.42 M/UL — LOW (ref 4.2–5.4)
RBC # FLD: 14.6 % — HIGH (ref 11.5–14.5)
RBC # FLD: 14.6 % — HIGH (ref 11.5–14.5)
SODIUM SERPL-SCNC: 142 MMOL/L — SIGNIFICANT CHANGE UP (ref 135–146)
TROPONIN T SERPL-MCNC: <0.01 NG/ML — SIGNIFICANT CHANGE UP
WBC # BLD: 20.61 K/UL — HIGH (ref 4.8–10.8)
WBC # BLD: 26.22 K/UL — HIGH (ref 4.8–10.8)
WBC # FLD AUTO: 20.61 K/UL — HIGH (ref 4.8–10.8)
WBC # FLD AUTO: 26.22 K/UL — HIGH (ref 4.8–10.8)

## 2019-03-21 PROCEDURE — 99291 CRITICAL CARE FIRST HOUR: CPT

## 2019-03-21 RX ORDER — METRONIDAZOLE 500 MG
500 TABLET ORAL EVERY 12 HOURS
Qty: 0 | Refills: 0 | Status: DISCONTINUED | OUTPATIENT
Start: 2019-03-21 | End: 2019-03-22

## 2019-03-21 RX ORDER — DOCUSATE SODIUM 100 MG
100 CAPSULE ORAL
Qty: 0 | Refills: 0 | Status: DISCONTINUED | OUTPATIENT
Start: 2019-03-21 | End: 2019-03-25

## 2019-03-21 RX ORDER — VANCOMYCIN HCL 1 G
1000 VIAL (EA) INTRAVENOUS ONCE
Qty: 0 | Refills: 0 | Status: COMPLETED | OUTPATIENT
Start: 2019-03-21 | End: 2019-03-21

## 2019-03-21 RX ORDER — SODIUM CHLORIDE 9 MG/ML
3 INJECTION INTRAMUSCULAR; INTRAVENOUS; SUBCUTANEOUS EVERY 8 HOURS
Qty: 0 | Refills: 0 | Status: DISCONTINUED | OUTPATIENT
Start: 2019-03-21 | End: 2019-03-25

## 2019-03-21 RX ORDER — PANTOPRAZOLE SODIUM 20 MG/1
40 TABLET, DELAYED RELEASE ORAL
Qty: 0 | Refills: 0 | Status: DISCONTINUED | OUTPATIENT
Start: 2019-03-21 | End: 2019-03-25

## 2019-03-21 RX ORDER — AZTREONAM 2 G
500 VIAL (EA) INJECTION EVERY 8 HOURS
Qty: 0 | Refills: 0 | Status: DISCONTINUED | OUTPATIENT
Start: 2019-03-21 | End: 2019-03-22

## 2019-03-21 RX ORDER — SENNA PLUS 8.6 MG/1
2 TABLET ORAL AT BEDTIME
Qty: 0 | Refills: 0 | Status: DISCONTINUED | OUTPATIENT
Start: 2019-03-21 | End: 2019-03-25

## 2019-03-21 RX ADMIN — HEPARIN SODIUM 5000 UNIT(S): 5000 INJECTION INTRAVENOUS; SUBCUTANEOUS at 06:55

## 2019-03-21 RX ADMIN — HEPARIN SODIUM 5000 UNIT(S): 5000 INJECTION INTRAVENOUS; SUBCUTANEOUS at 22:02

## 2019-03-21 RX ADMIN — Medication 100 MILLIGRAM(S): at 06:07

## 2019-03-21 RX ADMIN — Medication 650 MILLIGRAM(S): at 20:36

## 2019-03-21 RX ADMIN — SODIUM CHLORIDE 3 MILLILITER(S): 9 INJECTION INTRAMUSCULAR; INTRAVENOUS; SUBCUTANEOUS at 22:08

## 2019-03-21 RX ADMIN — ATORVASTATIN CALCIUM 40 MILLIGRAM(S): 80 TABLET, FILM COATED ORAL at 22:02

## 2019-03-21 RX ADMIN — SODIUM CHLORIDE 75 MILLILITER(S): 9 INJECTION, SOLUTION INTRAVENOUS at 04:00

## 2019-03-21 RX ADMIN — ESCITALOPRAM OXALATE 10 MILLIGRAM(S): 10 TABLET, FILM COATED ORAL at 11:06

## 2019-03-21 RX ADMIN — HEPARIN SODIUM 5000 UNIT(S): 5000 INJECTION INTRAVENOUS; SUBCUTANEOUS at 13:32

## 2019-03-21 RX ADMIN — Medication 100 MILLIGRAM(S): at 06:55

## 2019-03-21 RX ADMIN — Medication 250 MILLIGRAM(S): at 22:54

## 2019-03-21 RX ADMIN — Medication 100 MILLIGRAM(S): at 13:32

## 2019-03-21 RX ADMIN — Medication 50 MILLIGRAM(S): at 22:01

## 2019-03-21 RX ADMIN — Medication 650 MILLIGRAM(S): at 19:56

## 2019-03-21 RX ADMIN — Medication 100 MILLIGRAM(S): at 17:11

## 2019-03-21 RX ADMIN — CLOPIDOGREL BISULFATE 75 MILLIGRAM(S): 75 TABLET, FILM COATED ORAL at 11:06

## 2019-03-21 RX ADMIN — SENNA PLUS 2 TABLET(S): 8.6 TABLET ORAL at 22:02

## 2019-03-21 RX ADMIN — Medication 50 MILLIEQUIVALENT(S): at 03:07

## 2019-03-21 RX ADMIN — Medication 50 MILLIEQUIVALENT(S): at 00:33

## 2019-03-21 RX ADMIN — Medication 250 MILLIGRAM(S): at 06:06

## 2019-03-21 RX ADMIN — PANTOPRAZOLE SODIUM 40 MILLIGRAM(S): 20 TABLET, DELAYED RELEASE ORAL at 06:09

## 2019-03-21 NOTE — PROGRESS NOTE ADULT - SUBJECTIVE AND OBJECTIVE BOX
PGY 3 note    Patient seen at bedside s/p downgrade to regular floor. Denies pain. No fever/chills, nausea/vomiting, diarrhea. Suarez in. Tolerating regular diet. +Flatus.      ICU Vital Signs Last 24 Hrs  T(C): 36.7 (21 Mar 2019 21:51), Max: 36.9 (21 Mar 2019 12:52)  T(F): 98 (21 Mar 2019 21:51), Max: 98.5 (21 Mar 2019 12:52)  HR: 93 (21 Mar 2019 21:51) (70 - 102)  BP: 118/52 (21 Mar 2019 21:51) (105/56 - 118/52)  BP(mean): 79 (21 Mar 2019 08:00) (65 - 104)  ABP: --  ABP(mean): --  RR: 18 (21 Mar 2019 21:51) (15 - 24)  SpO2: 98% (21 Mar 2019 08:00) (93% - 98%)    GEN: NAD, sleeping comfortably, AAO x3, easily arousable  Heart: RRR  Lungs: CTAB  Abd: soft, NT, no r/g/r, +BS  External genitalia: kerlex wet to dry packing with dressing recently changed, no erythema or fluctuance in surrounding area. appropriately tender.  Ext: SCDs on, no calf tenderness or edema b/l, b/l bruising of knees, unchanged from admission      03-20-19 @ 07:01  -  03-21-19 @ 07:00  --------------------------------------------------------  IN: 1270 mL / OUT: 465 mL / NET: 805 mL    03-21-19 @ 07:01  -  03-21-19 @ 22:04  --------------------------------------------------------  IN: 600 mL / OUT: 465 mL / NET: 135 mL    @5197-6429 >400 cc in [place] clear         LABS:                        10.5   26.22 )-----------( 229      ( 21 Mar 2019 04:23 )             32.1                         11.4   27.12 )-----------( 258      ( 20 Mar 2019 21:42 )             34.5                         7.3    16.64 )-----------( 150      ( 20 Mar 2019 20:40 )             25.5                         12.3   23.82 )-----------( 255      ( 20 Mar 2019 08:47 )             36.8     Magnesium, Serum: 2.1 mg/dL (03-21 @ 04:23)    03-21-19 @ 04:23      142  |  103  |  35<H>  ----------------------------<  159<H>  4.5   |  26  |  <0.5<L>    03-20-19 @ 21:42      137  |  99  |  33<H>  ----------------------------<  221<H>  3.4<L>   |  24  |  1.0    03-20-19 @ 08:47      139  |  103  |  33<H>  ----------------------------<  140<H>  3.1<L>   |  24  |  0.9    03-19-19 @ 21:25      137  |  99  |  41<H>  ----------------------------<  118<H>  3.6   |  23  |  0.9    03-19-19 @ 07:46      126<L>  |  91<L>  |  49<H>  ----------------------------<  493<HH>  3.1<L>   |  23  |  0.9        Ca    8.2<L>      21 Mar 2019 04:23  Ca    8.4<L>      20 Mar 2019 21:42  Ca    8.4<L>      20 Mar 2019 08:47  Ca    8.9      19 Mar 2019 21:25  Ca    8.1<L>      19 Mar 2019 07:46  Phos  4.1     03-21  Phos  4.0     03-20  Phos  tnp     03-20  Mg     2.1     03-21  Mg     2.0     03-20  Mg     tnp     03-20  Mg     2.2     03-20  Mg     1.7<L>     03-19    TPro  5.1<L>  /  Alb  2.8<L>  /  TBili  0.8  /  DBili  x   /  AST  18  /  ALT  28  /  AlkPhos  106  03-20-19 @ 08:47  TPro  5.0<L>  /  Alb  2.6<L>  /  TBili  0.5  /  DBili  x   /  AST  31  /  ALT  34  /  AlkPhos  111  03-19-19 @ 07:46    PT/INR - ( 21 Mar 2019 04:23 )   PT: 19.00 sec;   INR: 1.66 ratio         PTT - ( 21 Mar 2019 04:23 )  PTT:27.7 sec    @ 0430 , CK MB 6.4, negative troponins    HbA1c 6.1, HIV NR    Culture - Surgical Swab (collected 03-20-19 @ 17:11)  Source: .Surgical Swab rt. vulvar abscess  Preliminary Report (03-21-19 @ 18:05):    No growth    Culture - Genital (collected 03-18-19 @ 17:13)  Source: .Genital Vulva  Final Report (03-20-19 @ 18:21):    Few Routine vaginal kinsey      Medications  acetaminophen   Tablet ..   650 milliGRAM(s) Oral (03-21-19 @ 19:56)    atorvastatin   40 milliGRAM(s) Oral (03-21-19 @ 22:02)    aztreonam  IVPB   100 mL/Hr IV Intermittent (03-21-19 @ 06:07)    aztreonam  IVPB   50 mL/Hr IV Intermittent (03-21-19 @ 22:01)    clopidogrel Tablet   75 milliGRAM(s) Oral (03-21-19 @ 11:06)    docusate sodium   100 milliGRAM(s) Oral (03-21-19 @ 17:11)    escitalopram   10 milliGRAM(s) Oral (03-21-19 @ 11:06)    heparin  Injectable   5000 Unit(s) SubCutaneous (03-21-19 @ 06:55)   5000 Unit(s) SubCutaneous (03-21-19 @ 13:32)   5000 Unit(s) SubCutaneous (03-21-19 @ 22:02)    lactated ringers.   75 mL/Hr IV Continuous (03-20-19 @ 23:52)    metroNIDAZOLE  IVPB   100 mL/Hr IV Intermittent (03-21-19 @ 06:55)   100 mL/Hr IV Intermittent (03-21-19 @ 13:32)    pantoprazole    Tablet   40 milliGRAM(s) Oral (03-21-19 @ 06:09)    potassium chloride  20 mEq/100 mL IVPB   50 mL/Hr IV Intermittent (03-21-19 @ 00:33)   50 mL/Hr IV Intermittent (03-21-19 @ 03:07)    senna   2 Tablet(s) Oral (03-21-19 @ 22:02)    vancomycin  IVPB   250 mL/Hr IV Intermittent (03-21-19 @ 06:06)

## 2019-03-21 NOTE — PROGRESS NOTE ADULT - ATTENDING COMMENTS
stable overnight   resume diet and home meds   wound care   transfer to floor   burn service will follow.

## 2019-03-21 NOTE — PROGRESS NOTE ADULT - ASSESSMENT
89yo with h/o multiple comorbidities, s/p I&D, sharp debridement of right labial abscess, EBL 150cc, POD1, wound currently open and packed with kerlex, currently admitted to SICU.  -management per SICU  -GYN to follow    Dr. Delaney to be made aware. 89yo with h/o multiple comorbidities, s/p I&D, sharp debridement of right labial abscess, EBL 150cc, POD1, wound currently open and packed with kerlex, on IV antibiotics, currently admitted to SICU.  -management per SICU  -elevated WBC 27-- 26. surgical wound culture pending. ID consult pending  -f/u burn recommendations for further wound care  -Pain management PRN  -Suarez to remain in place  -GYN to follow    Dr. Delaney aware.

## 2019-03-21 NOTE — CONSULT NOTE ADULT - ATTENDING COMMENTS
Discussed with gyn service and with pt and family
s/p debridement   continue resuscitation   possible burn unit transfer
pt going to OR by MICHELINE for bartholin cyst infection.   will be available for help  c
I have personally examined the patient and reviewed the documentation above.  Corrections and edits were made wherever needed.

## 2019-03-21 NOTE — CONSULT NOTE ADULT - SUBJECTIVE AND OBJECTIVE BOX
BENITEZ FOX  89y, Female  Allergy: Levaquin (Unknown)  penicillin (Hives)  sulfa drugs (Hives)      HPI:  89yo , with right labial pain and swelling since Monday. She presented to her Gynecologist on Tuesday, Dr. Prather, who subsequently performed and L+D, and sent her home with Doxycycline BID for 7 days. She reports fevers of 100.7 that evening, which resolved the next day. She states since Tuesday, her right ford has been draining blood and dark fluid, foul smelling, and is extremely painful, 10/10. She stopped taking the doxycycline on Wednesday, secondary to dizziness, nausea, vomiting, prompting a fall around 0400 on Wednesday. She denies head trauma, and bruised her knees and wrists. She reports since then, the swelling has become larger and more painful at rest. Denies difficulty with urination, bowel movements, sexual activity, CP, SOB, palpitations, abdominal pain, vaginal pain. Reports she waited until this Monday to present to the ED secondary to having family in town.   Ob: P2, NSVDX2 at [facility].   Gyn: Denies history of abnormal papsmears, STIs, uterine fibroids or ovarian cysts. Reports history of breast cancer in , s/p lumpectomy and radiation. No history of pelvic abscess or cysts. Not sexually active. (18 Mar 2019 17:11)    Patient says her R labia pain started as a small pimple. Today she is POD1 s/p I&D. She complains of a dull pain at surgical site but overall says she feels much better.     FAMILY HISTORY:  No pertinent family history in first degree relatives    PAST MEDICAL & SURGICAL HISTORY:  Atrial fibrillation  Stented coronary artery  Cataract  Breast cancer  S/P lumpectomy, right breast  H/O total knee replacement, left      ROS negative except as per HPI    VITALS:  T(F): 98, Max: 98.8 (19 @ 19:30)  HR: 86  BP: 114/66  RR: 16Vital Signs Last 24 Hrs  T(C): 36.7 (21 Mar 2019 08:00), Max: 37.1 (20 Mar 2019 13:00)  T(F): 98 (21 Mar 2019 08:00), Max: 98.8 (20 Mar 2019 19:30)  HR: 86 (21 Mar 2019 08:00) (70 - 112)  BP: 114/66 (21 Mar 2019 08:00) (93/66 - 139/67)  BP(mean): 79 (21 Mar 2019 08:00) (65 - 104)  RR: 16 (21 Mar 2019 08:00) (15 - 32)  SpO2: 98% (21 Mar 2019 08:00) (87% - 98%)    PHYSICAL EXAM:  General: WN/WD   Neurology: A&Ox3  Eyes: PERRLA/ EOMI, Gross vision intact  ENT/Neck: Neck supple, trachea midline, No JVD, Gross hearing intact  Respiratory: CTA B/L, No wheezing, rales, rhonchi  CV: RRR, S1S2, no murmurs, rubs or gallops  Abdominal: Soft, NT, ND +BS,   Extremities: No edema, + peripheral pulses  Skin: s/p I&D of R labia. Wound packed and covered by dressing. C/D/I. No Rashes, Hematoma, Ecchymosis  Incisions: stark       TESTS & MEASUREMENTS:                        10.5   26.22 )-----------( 229      ( 21 Mar 2019 04:23 )             32.1     03-    142  |  103  |  35<H>  ----------------------------<  159<H>  4.5   |  26  |  <0.5<L>    Ca    8.2<L>      21 Mar 2019 04:23  Phos  4.1     -  Mg     2.1     -    TPro  5.1<L>  /  Alb  2.8<L>  /  TBili  0.8  /  DBili  x   /  AST  18  /  ALT  28  /  AlkPhos  106  03-20    LIVER FUNCTIONS - ( 20 Mar 2019 08:47 )  Alb: 2.8 g/dL / Pro: 5.1 g/dL / ALK PHOS: 106 U/L / ALT: 28 U/L / AST: 18 U/L / GGT: x             Culture - Genital (collected 19 @ 17:13)  Source: .Genital Vulva  Final Report (19 @ 18:21):    Few Routine vaginal kinsey      RADIOLOGY & ADDITIONAL TESTS:   from: CT Abdomen and Pelvis w/ IV Cont (19 @ 21:18)     IMPRESSION:        Right labial abscess containing multiple foci of air measuring 4 x 4 x 6   cm. this may reflect underlying necrosis and may also reflect sequela of   prior drainage. Abscess tracks superiorly along anterior subcutaneous   fat. No liquid drainable collection.    ANTIBIOTICS:  aztreonam  IVPB   100 mL/Hr IV Intermittent (19 @ 06:07)   100 mL/Hr IV Intermittent (19 @ 19:00)    aztreonam  IVPB   100 mL/Hr IV Intermittent (19 @ 23:19)    aztreonam  IVPB   100 mL/Hr IV Intermittent (19 @ 05:03)   100 mL/Hr IV Intermittent (19 @ 17:13)   100 mL/Hr IV Intermittent (19 @ 06:08)    metroNIDAZOLE  IVPB   100 mL/Hr IV Intermittent (19 @ 06:55)   100 mL/Hr IV Intermittent (19 @ 21:59)    metroNIDAZOLE  IVPB   100 mL/Hr IV Intermittent (19 @ 05:02)   100 mL/Hr IV Intermittent (19 @ 22:12)   100 mL/Hr IV Intermittent (19 @ 13:01)   100 mL/Hr IV Intermittent (19 @ 06:08)    metroNIDAZOLE  IVPB   100 mL/Hr IV Intermittent (19 @ 18:34)    vancomycin  IVPB   250 mL/Hr IV Intermittent (19 @ 21:22)    vancomycin  IVPB   250 mL/Hr IV Intermittent (19 @ 05:02)   250 mL/Hr IV Intermittent (19 @ 17:13)   250 mL/Hr IV Intermittent (19 @ 06:08)    vancomycin  IVPB   250 mL/Hr IV Intermittent (19 @ 06:06)   250 mL/Hr IV Intermittent (19 @ 20:12)

## 2019-03-21 NOTE — PROGRESS NOTE ADULT - ASSESSMENT
89yo P2, h/o CAD with stent x3, a-fib, HTN, HLD, Breast CA in remission, s/p I&D and debridement of labial abscess extending to anterior mons possible necrotizing fascitis POD #1 on IV antibiotics, leukocytosis, but afebrile, no signs of sepsis at this time, recovering well, s/p downgrade from SICU  - continue vanco/aztreonam/flagyl  -f/u cardiac enzymes (set number 2 of 3, as per SICU recs)  -1600 labs missed-->f/u 2000 rounds  -Vanco trough needed prior to next dose  - continue plavix per cardio but hold eliquis in anticipation of reop in near future  -strict I/O  -f/u final OR cultures  -monitor for signs of sepsis  -DVT/GI prophylaxis    Will make attending on call aware

## 2019-03-21 NOTE — PROGRESS NOTE ADULT - SUBJECTIVE AND OBJECTIVE BOX
PGY4 GYN Progress Note    POD #1  Procedure:  I&D, sharp debridement of right labial abscess, EBL 150cc  On Vancomycin + Aztreonam + Metronidazole    Subjective: Patient is doing well. Pain well controlled. Patient is not ambulating yet. She has stark catheter draining clear urine with goo UO. She is tolerating regular diet. Denies chest pain, SOB, dizziness, lightheadedness.     Physical exam:    Vital Signs Last 24 Hrs  T(C): 36.9 (21 Mar 2019 12:52), Max: 37.1 (20 Mar 2019 19:30)  T(F): 98.5 (21 Mar 2019 12:52), Max: 98.8 (20 Mar 2019 19:30)  HR: 90 (21 Mar 2019 12:52) (70 - 102)  BP: 108/53 (21 Mar 2019 12:52) (104/58 - 116/60)  BP(mean): 79 (21 Mar 2019 08:00) (65 - 104)  RR: 18 (21 Mar 2019 12:52) (15 - 24)  SpO2: 98% (21 Mar 2019 08:00) (92% - 98%)      03-20-19 @ 07:01  -  03-21-19 @ 07:00  --------------------------------------------------------  IN: 1270 mL / OUT: 465 mL / NET: 805 mL    03-21-19 @ 07:01  -  03-21-19 @ 19:16  --------------------------------------------------------  IN: 600 mL / OUT: 465 mL / NET: 135 mL        Gen: NAD, alert and oriented  Abdomen: BS+, soft, not tender, not distended  Pelvic: packing removed, open wound looks clean, repacked with wet curlex     Meds:     atorvastatin   40 milliGRAM(s) Oral (03-20-19 @ 21:59)    aztreonam  IVPB   100 mL/Hr IV Intermittent (03-21-19 @ 06:07)    clopidogrel Tablet   75 milliGRAM(s) Oral (03-21-19 @ 11:06)    docusate sodium   100 milliGRAM(s) Oral (03-21-19 @ 17:11)    escitalopram   10 milliGRAM(s) Oral (03-21-19 @ 11:06)    heparin  Injectable   5000 Unit(s) SubCutaneous (03-21-19 @ 13:32)   5000 Unit(s) SubCutaneous (03-21-19 @ 06:55)    lactated ringers.   75 mL/Hr IV Continuous (03-20-19 @ 23:52)    metroNIDAZOLE  IVPB   100 mL/Hr IV Intermittent (03-21-19 @ 13:32)   100 mL/Hr IV Intermittent (03-21-19 @ 06:55)   100 mL/Hr IV Intermittent (03-20-19 @ 21:59)    pantoprazole    Tablet   40 milliGRAM(s) Oral (03-21-19 @ 06:09)    potassium chloride  20 mEq/100 mL IVPB   50 mL/Hr IV Intermittent (03-21-19 @ 03:07)   50 mL/Hr IV Intermittent (03-21-19 @ 00:33)    vancomycin  IVPB   250 mL/Hr IV Intermittent (03-21-19 @ 06:06)   250 mL/Hr IV Intermittent (03-20-19 @ 20:12)        LABS:                        10.5   26.22 )-----------( 229      ( 21 Mar 2019 04:23 )             32.1                         11.4   27.12 )-----------( 258      ( 20 Mar 2019 21:42 )             34.5                         7.3    16.64 )-----------( 150      ( 20 Mar 2019 20:40 )             25.5                         12.3   23.82 )-----------( 255      ( 20 Mar 2019 08:47 )             36.8     Magnesium, Serum: 2.1 mg/dL (03-21 @ 04:23)  Magnesium, Serum: 2.0 mg/dL (03-20 @ 21:42)  Magnesium, Serum: tnp mg/dL (03-20 @ 20:40)    03-21-19 @ 04:23      142  |  103  |  35<H>  ----------------------------<  159<H>  4.5   |  26  |  <0.5<L>    03-20-19 @ 21:42      137  |  99  |  33<H>  ----------------------------<  221<H>  3.4<L>   |  24  |  1.0    03-20-19 @ 08:47      139  |  103  |  33<H>  ----------------------------<  140<H>  3.1<L>   |  24  |  0.9    03-19-19 @ 21:25      137  |  99  |  41<H>  ----------------------------<  118<H>  3.6   |  23  |  0.9    03-19-19 @ 07:46      126<L>  |  91<L>  |  49<H>  ----------------------------<  493<HH>  3.1<L>   |  23  |  0.9        Ca    8.2<L>      21 Mar 2019 04:23  Ca    8.4<L>      20 Mar 2019 21:42  Ca    8.4<L>      20 Mar 2019 08:47  Ca    8.9      19 Mar 2019 21:25  Ca    8.1<L>      19 Mar 2019 07:46  Phos  4.1     03-21  Phos  4.0     03-20  Phos  tnp     03-20  Mg     2.1     03-21  Mg     2.0     03-20  Mg     tnp     03-20  Mg     2.2     03-20  Mg     1.7<L>     03-19    TPro  5.1<L>  /  Alb  2.8<L>  /  TBili  0.8  /  DBili  x   /  AST  18  /  ALT  28  /  AlkPhos  106  03-20-19 @ 08:47  TPro  5.0<L>  /  Alb  2.6<L>  /  TBili  0.5  /  DBili  x   /  AST  31  /  ALT  34  /  AlkPhos  111  03-19-19 @ 07:46          Allergies    Levaquin (Unknown)  penicillin (Hives)  sulfa drugs (Hives)    Intolerances        Culture - Surgical Swab (collected 03-20-19 @ 17:11)  Source: .Surgical Swab rt. vulvar abscess  Preliminary Report (03-21-19 @ 18:05):    No growth    Culture - Genital (collected 03-18-19 @ 17:13)  Source: .Genital Vulva  Final Report (03-20-19 @ 18:21):    Few Routine vaginal kinsey

## 2019-03-21 NOTE — CHART NOTE - NSCHARTNOTEFT_GEN_A_CORE
89 F POD#1 s/p extensive I and D of foul smelling purulent and necrotic tissue of labia and vulva, ICU admission for cardiac monitoring     PAST MEDICAL & SURGICAL HISTORY:  Atrial fibrillation  Stented coronary artery  Cataract  Breast cancer  S/P lumpectomy, right breast  H/O total knee replacement, left    Home Medications:  atenolol 25 mg oral tablet: 1 tab(s) orally once a day (18 Mar 2019 13:49)  atorvastatin 40 mg oral tablet: 1 tab(s) orally once a day (18 Mar 2019 13:49)  candesartan 16 mg oral tablet: 1 tab(s) orally once a day (18 Mar 2019 13:49)  chlorthalidone 25 mg oral tablet: 1 tab(s) orally once a day (18 Mar 2019 13:49)  Eliquis 2.5 mg oral tablet: 1 tab(s) orally 2 times a day (18 Mar 2019 13:49)  Lexapro 10 mg oral tablet: 1 tab(s) orally once a day (18 Mar 2019 13:49)  Plavix 75 mg oral tablet: 1 tab(s) orally once a day (18 Mar 2019 13:49)        Neurologic: pain control with tylenol and motrin prn, avoid narcs due to advanced age  Resumed Lexapro  Respiratory: on 3L NC wean as tolerated,   Cardiovascular: h/o HTN, HLD, AFib, CAD/PCI, continue Atenolol, Losartan, and Atorvastatin, back on Plavix.  Eliquis on hold for now due to possible further debridements, awaiting Burn c/s.  Follow up 3 sets of CE. negative x 1  Gastrointestinal/Nutrition: regular diet, PPI, LR @ 75/hr.  IVL once tolerating diet  Genitourinary/Renal: stark in place. strict I and Os, repleted KCL for hypokalemia, K3.4>4.5  Hematologic: h/h stable post-op, heparin sq started, full AC with Eliquis on hold for now, f/up if further debridements needed.  Infectious Disease: WBC 27 post-op, monitor fever curve, continue Vanco, Aztreonam and Flagyl for extensive labile infection  Endocrine: -156    DVT PTX: Hep sq    GI PTX: PPI      Follow Up:  -ID consult recs  -possible burn for further debridement  -vanc trough at 1600  -restart eliquis for  -cardiac enzymes @ 1600    Signed out to 89 F POD#1 s/p extensive I and D of foul smelling purulent and necrotic tissue of labia and vulva, ICU admission for cardiac monitoring     PAST MEDICAL & SURGICAL HISTORY:  Atrial fibrillation  Stented coronary artery  Cataract  Breast cancer  S/P lumpectomy, right breast  H/O total knee replacement, left    Home Medications:  atenolol 25 mg oral tablet: 1 tab(s) orally once a day (18 Mar 2019 13:49)  atorvastatin 40 mg oral tablet: 1 tab(s) orally once a day (18 Mar 2019 13:49)  candesartan 16 mg oral tablet: 1 tab(s) orally once a day (18 Mar 2019 13:49)  chlorthalidone 25 mg oral tablet: 1 tab(s) orally once a day (18 Mar 2019 13:49)  Eliquis 2.5 mg oral tablet: 1 tab(s) orally 2 times a day (18 Mar 2019 13:49)  Lexapro 10 mg oral tablet: 1 tab(s) orally once a day (18 Mar 2019 13:49)  Plavix 75 mg oral tablet: 1 tab(s) orally once a day (18 Mar 2019 13:49)        Neurologic: pain control with tylenol and motrin prn, avoid narcs due to advanced age  Resumed Lexapro  Respiratory: on 3L NC wean as tolerated,   Cardiovascular: h/o HTN, HLD, AFib, CAD/PCI, continue Atenolol, Losartan, and Atorvastatin, back on Plavix.  Eliquis on hold for now due to possible further debridements, awaiting Burn c/s.  Follow up 3 sets of CE. negative x 1  Gastrointestinal/Nutrition: regular diet, PPI.  IVL once tolerating diet  Genitourinary/Renal: stark in place. strict I and Os, repleted KCL for hypokalemia, K3.4>4.5  Hematologic: h/h stable post-op, heparin sq started, full AC with Eliquis on hold for now, f/up if further debridements needed.  Infectious Disease: WBC 27 post-op, monitor fever curve, continue Vanco, Aztreonam and Flagyl for extensive labile infection  Endocrine: -156    DVT PTX: Hep sq    GI PTX: PPI      Follow Up:  -ID consult recs  -possible burn for further debridement  -vanc trough at 1600  -restart eliquis for  -cardiac enzymes @ 1600    Signed out to Dr. Ahuja  3/21/19 @ 1255

## 2019-03-21 NOTE — PROGRESS NOTE ADULT - ASSESSMENT
89yo with h/o multiple comorbidities, s/p I&D, sharp debridement of right labial abscess, EBL 150cc, POD1, doing well, downgraded from SICU    - f/u Burn consult - Dr Osman saw patient recommended to continue wet to dry dressings and possible closure next week, no need for further debridement at the moment.  - f/u 1600 labs with vanco through  - ID consult appreciated, Linezolid has interaction with escitalopram, will continue vancomycin for now  -Pain management PRN  -Suarez to remain in place    Dr. Lieberman aware.

## 2019-03-21 NOTE — CONSULT NOTE ADULT - SUBJECTIVE AND OBJECTIVE BOX
HPI:  89yo , with right labial pain and swelling since Monday. She presented to her Gynecologist on Tuesday, Dr. Prather, who subsequently performed and L+D, and sent her home with Doxycycline BID for 7 days. She reports fevers of 100.7 that evening, which resolved the next day. She states since Tuesday, her right ford has been draining blood and dark fluid, foul smelling, and is extremely painful, 10/10. She stopped taking the doxycycline on Wednesday, secondary to dizziness, nausea, vomiting, prompting a fall around 0400 on Wednesday. She denies head trauma, and bruised her knees and wrists. She reports since then, the swelling has become larger and more painful at rest. Denies difficulty with urination, bowel movements, sexual activity, CP, SOB, palpitations, abdominal pain, vaginal pain. Reports she waited until this Monday to present to the ED secondary to having family in town.   Ob: P2, NSVDX2 at [facility].   Gyn: Denies history of abnormal papsmears, STIs, uterine fibroids or ovarian cysts. Reports history of breast cancer in , s/p lumpectomy and radiation. No history of pelvic abscess or cysts. Not sexually active. (18 Mar 2019 17:11)    Pt POD # 1 s/p I&D of abscess right labia. Seen in PACU this am and again now   Daughter and son at bedside  Pt c/o some pain during wound evaluation however pain meds declined   Vital Signs Last 24 Hrs  T(C): 36.7 (21 Mar 2019 21:51), Max: 36.9 (21 Mar 2019 12:52)  T(F): 98 (21 Mar 2019 21:51), Max: 98.5 (21 Mar 2019 12:52)  HR: 93 (21 Mar 2019 21:51) (70 - 102)  BP: 118/52 (21 Mar 2019 21:51) (105/56 - 118/52)  BP(mean): 79 (21 Mar 2019 08:00) (65 - 104)  RR: 18 (21 Mar 2019 21:51) (15 - 24)  SpO2: 98% (21 Mar 2019 08:00) (93% - 98%)                          10.5   26.22 )-----------( 229      ( 21 Mar 2019 04:23 )             32.1     EXAM:    Pt awake alert conversing appropriately   Labia - edema ; bloody packing removed   grossly clean wound ~ 8 cm open incision with proximal undermining into groin ~ 7 cm  no bleeding

## 2019-03-21 NOTE — CONSULT NOTE ADULT - ASSESSMENT
IMPRESSION: Infected R labial abscess. Leukocytosis, afebrile. S/p I&D pending culture. No signs of surrounding infection, no purulent drainage noted.     RECOMMENDATIONS:  C/w current antibiotic regimen pending surgical culture + sensitivities   F/u CBC     INCOMPLETE NOTE - will discuss with attending IMPRESSION: Infected R labial abscess. Leukocytosis, afebrile. S/p I&D pending culture. No signs of surrounding infection, no purulent drainage noted.     RECOMMENDATIONS:  IV Zybox 600 q12  IV Aztreonam 500 q8  IV Flagyl 500 q12  D/c other antibiotics  F/u culture and sensitivities from I&D IMPRESSION:  Infected R labial subcutaneous abscess.  Cultures pending    RECOMMENDATIONS:  Zyvox 600 mg iv q12h  Aztreonam 500 mg iv  q8h  Flagyl 500mg iv  q12h  D/c other antibiotics  F/u culture and sensitivities from I&D

## 2019-03-21 NOTE — PROGRESS NOTE ADULT - ASSESSMENT
ASSESSMENT/PLAN: 89yo F with PMHx of CAD, prior PCI/stents (on Plavix), AFib (on Eliquis), h/o breast Ca'92, s/p right breast lumpectomy and radiation, who has been experiencing right labial pain and swelling since 3/11, s/p extensive I and D of foul smelling purulent and necrotic tissue of labia and vulva     Neurologic: pain control with tylenol and motrin prn, morphine 1mg q6h if needed.   Resumed Lexapro    Respiratory: on 3L NC wean as tolerated, encourage IS    Cardiovascular: keep normotensive, h/o HTN, HLD, AFib, CAD/PCI, continue Atenolol, Losartan, and Atorvastatin, back on Plavix.  Eliquis on hold for now due to possible further debridements, awaiting Burn c/s.  Follow up 3 sets of CE.    Gastrointestinal/Nutrition: regular diet, PPI, LR @ 75/hr.  IVL once tolerating diet    Genitourinary/Renal: strict I and Os, replete electrolytes prn    Hematologic: h/h stable, heparin sq started, full AC with Eliquis on hold for now, f/up if further debridements needed.    Infectious Disease: WBC 27 post-op, monitor fever curve, continue Vanco, Aztreonam and Flagyl for extensive labile infection.  F/up Vanco trough level    Endocrine: FS q6h if NPO     Disposition: SICU, possible transfer to burn service ASSESSMENT/PLAN: 89yo F with PMHx of CAD, prior PCI/stents (on Plavix), AFib (on Eliquis), h/o breast Ca'92, s/p right breast lumpectomy and radiation, who has been experiencing right labial pain and swelling since 3/11, s/p extensive I and D of foul smelling purulent and necrotic tissue of labia and vulva     Neurologic: pain control with tylenol and motrin prn, morphine 1mg q6h if needed.   Resumed Lexapro    Respiratory: on room air, encourage IS    Cardiovascular: keep normotensive, h/o HTN, HLD, AFib, CAD/PCI, continue Atenolol, Losartan, and Atorvastatin, back on Plavix.  Eliquis on hold for now due to possible further debridements, awaiting Burn c/s.  Follow up 3 sets of CE.    Gastrointestinal/Nutrition: regular diet, PPI, IV lock.      Genitourinary/Renal: strict I and Os, replete electrolytes prn    Hematologic: h/h stable, heparin sq started, full AC with Eliquis on hold for now, f/up if further debridements needed.    Infectious Disease: WBC 27 post-op, monitor fever curve, continue Vanco, Aztreonam and Flagyl for extensive labile infection.  F/up Vanco trough level    Endocrine: FS      Disposition: Downgrade to floor

## 2019-03-21 NOTE — CONSULT NOTE ADULT - ASSESSMENT
Rec ; continue packing   No urgent need for further debridement  Wound can likely be further debrided and closed over drain next week   Monitor wbc ; cont appropriate abx adjust  based on cxs

## 2019-03-21 NOTE — CONSULT NOTE ADULT - CONSULT REASON
Labial abscess s/p I&D
Right labial wound
labial abscess
preop
s/p extensive I and D of foul smelling purulent and necrotic tissue of labia and vulva

## 2019-03-21 NOTE — PROGRESS NOTE ADULT - SUBJECTIVE AND OBJECTIVE BOX
PGY4 Note:    Pt seen and examined at bedside. Denies chest pain, SOB, fever/chills, n/v. Reports mild discomfort over perineal area.     Vital Signs Last 24 Hrs  T(F): 98.8 (20 Mar 2019 19:30), Max: 98.8 (20 Mar 2019 19:30)  HR: 81 (21 Mar 2019 04:00) (70 - 112)  BP: 106/62 (21 Mar 2019 04:00) (93/66 - 139/67)  RR: 18 (21 Mar 2019 04:00) (17 - 32)  SpO2: 95% (21 Mar 2019 04:00) (87% - 96%)    Gen: NAD, AAOx3  Heart: s1s2, rrr  Lungs: ctab  Perineum: dressing in place, almost soaked with sersanguineous fluid (kerlex packing was moistened with water prior to packing)  Ext: SCDs in place     03-19 @ 07:01  -  03-20 @ 07:00  --------------------------------------------------------  IN: 575 mL / OUT: 0 mL / NET: 575 mL    03-20 @ 07:01  -  03-21 @ 06:37  --------------------------------------------------------  IN: 1045 mL / OUT: 370 mL / NET: 675 mL      LABS:                        10.5   26.22 )-----------( 229      ( 21 Mar 2019 04:23 )             32.1                         11.4   27.12 )-----------( 258      ( 20 Mar 2019 21:42 )             34.5                         7.3    16.64 )-----------( 150      ( 20 Mar 2019 20:40 )             25.5                         12.3   23.82 )-----------( 255      ( 20 Mar 2019 08:47 )             36.8                         11.8   14.60 )-----------( 248      ( 19 Mar 2019 07:46 )             35.0     Magnesium, Serum: 2.1 mg/dL (03-21 @ 04:23)  Magnesium, Serum: 2.0 mg/dL (03-20 @ 21:42)  Magnesium, Serum: tnp mg/dL (03-20 @ 20:40)  Magnesium, Serum: 2.2 mg/dL (03-20 @ 08:47)    03-21-19 @ 04:23      142  |  103  |  35<H>  ----------------------------<  159<H>  4.5   |  26  |  <0.5<L>    03-20-19 @ 21:42      137  |  99  |  33<H>  ----------------------------<  221<H>  3.4<L>   |  24  |  1.0    03-20-19 @ 08:47      139  |  103  |  33<H>  ----------------------------<  140<H>  3.1<L>   |  24  |  0.9    03-19-19 @ 21:25      137  |  99  |  41<H>  ----------------------------<  118<H>  3.6   |  23  |  0.9    03-19-19 @ 07:46      126<L>  |  91<L>  |  49<H>  ----------------------------<  493<HH>  3.1<L>   |  23  |  0.9    03-18-19 @ 15:30      144  |  101  |  78<HH>  ----------------------------<  101<H>  3.8   |  27  |  1.2        Ca    8.2<L>      21 Mar 2019 04:23  Ca    8.4<L>      20 Mar 2019 21:42  Ca    8.4<L>      20 Mar 2019 08:47  Ca    8.9      19 Mar 2019 21:25  Ca    8.1<L>      19 Mar 2019 07:46  Ca    9.6      18 Mar 2019 15:30  Phos  4.1     03-21  Phos  4.0     03-20  Phos  tnp     03-20  Mg     2.1     03-21  Mg     2.0     03-20  Mg     tnp     03-20  Mg     2.2     03-20  Mg     1.7<L>     03-19    TPro  5.1<L>  /  Alb  2.8<L>  /  TBili  0.8  /  DBili  x   /  AST  18  /  ALT  28  /  AlkPhos  106  03-20-19 @ 08:47  TPro  5.0<L>  /  Alb  2.6<L>  /  TBili  0.5  /  DBili  x   /  AST  31  /  ALT  34  /  AlkPhos  111  03-19-19 @ 07:46  TPro  5.9<L>  /  Alb  3.4<L>  /  TBili  0.5  /  DBili  x   /  AST  45<H>  /  ALT  58<H>  /  AlkPhos  159<H>  03-18-19 @ 15:30          Culture - Genital (collected 03-18-19 @ 17:13)  Source: .Genital Vulva  Final Report (03-20-19 @ 18:21):    Few Routine vaginal kinsey    Surgical wound Cx pending    MEDICATIONS  (STANDING):  ATENolol  Tablet 25 milliGRAM(s) Oral daily  atorvastatin 40 milliGRAM(s) Oral at bedtime  aztreonam  IVPB 2000 milliGRAM(s) IV Intermittent every 12 hours  clopidogrel Tablet 75 milliGRAM(s) Oral daily  escitalopram 10 milliGRAM(s) Oral daily  heparin  Injectable 5000 Unit(s) SubCutaneous every 8 hours  lactated ringers. 1000 milliLiter(s) (75 mL/Hr) IV Continuous <Continuous>  losartan 50 milliGRAM(s) Oral daily  metroNIDAZOLE  IVPB 500 milliGRAM(s) IV Intermittent every 8 hours  pantoprazole    Tablet 40 milliGRAM(s) Oral before breakfast  vancomycin  IVPB 1000 milliGRAM(s) IV Intermittent every 12 hours    MEDICATIONS  (PRN):  acetaminophen   Tablet .. 650 milliGRAM(s) Oral every 6 hours PRN Mild Pain (1 - 3)  ibuprofen  Tablet. 600 milliGRAM(s) Oral every 8 hours PRN Moderate Pain (4 - 6)  morphine  - Injectable 1 milliGRAM(s) IV Push every 6 hours PRN Severe Pain (7 - 10) PGY4 Note:    Pt seen and examined at bedside. Denies chest pain, SOB, fever/chills, n/v. Reports mild discomfort over perineal area.     Vital Signs Last 24 Hrs  T(F): 98.8 (20 Mar 2019 19:30), Max: 98.8 (20 Mar 2019 19:30)  HR: 81 (21 Mar 2019 04:00) (70 - 112)  BP: 106/62 (21 Mar 2019 04:00) (93/66 - 139/67)  RR: 18 (21 Mar 2019 04:00) (17 - 32)  SpO2: 95% (21 Mar 2019 04:00) (87% - 96%)    Gen: NAD, AAOx3  Heart: s1s2, rrr  Lungs: ctab  Abd: soft, nontender, nondistended  Perineum: dressing in place, almost soaked with sersanguineous fluid (kerlex packing was moistened with water prior to packing)  Ext: SCDs in place     03-19 @ 07:01  -  03-20 @ 07:00  --------------------------------------------------------  IN: 575 mL / OUT: 0 mL / NET: 575 mL    03-20 @ 07:01  -  03-21 @ 06:37  --------------------------------------------------------  IN: 1045 mL / OUT: 370 mL / NET: 675 mL      LABS:                        10.5   26.22 )-----------( 229      ( 21 Mar 2019 04:23 )             32.1                         11.4   27.12 )-----------( 258      ( 20 Mar 2019 21:42 )             34.5                         7.3    16.64 )-----------( 150      ( 20 Mar 2019 20:40 )             25.5                         12.3   23.82 )-----------( 255      ( 20 Mar 2019 08:47 )             36.8                         11.8   14.60 )-----------( 248      ( 19 Mar 2019 07:46 )             35.0     Magnesium, Serum: 2.1 mg/dL (03-21 @ 04:23)  Magnesium, Serum: 2.0 mg/dL (03-20 @ 21:42)  Magnesium, Serum: tnp mg/dL (03-20 @ 20:40)  Magnesium, Serum: 2.2 mg/dL (03-20 @ 08:47)    03-21-19 @ 04:23      142  |  103  |  35<H>  ----------------------------<  159<H>  4.5   |  26  |  <0.5<L>    03-20-19 @ 21:42      137  |  99  |  33<H>  ----------------------------<  221<H>  3.4<L>   |  24  |  1.0    03-20-19 @ 08:47      139  |  103  |  33<H>  ----------------------------<  140<H>  3.1<L>   |  24  |  0.9    03-19-19 @ 21:25      137  |  99  |  41<H>  ----------------------------<  118<H>  3.6   |  23  |  0.9    03-19-19 @ 07:46      126<L>  |  91<L>  |  49<H>  ----------------------------<  493<HH>  3.1<L>   |  23  |  0.9    03-18-19 @ 15:30      144  |  101  |  78<HH>  ----------------------------<  101<H>  3.8   |  27  |  1.2        Ca    8.2<L>      21 Mar 2019 04:23  Ca    8.4<L>      20 Mar 2019 21:42  Ca    8.4<L>      20 Mar 2019 08:47  Ca    8.9      19 Mar 2019 21:25  Ca    8.1<L>      19 Mar 2019 07:46  Ca    9.6      18 Mar 2019 15:30  Phos  4.1     03-21  Phos  4.0     03-20  Phos  tnp     03-20  Mg     2.1     03-21  Mg     2.0     03-20  Mg     tnp     03-20  Mg     2.2     03-20  Mg     1.7<L>     03-19    TPro  5.1<L>  /  Alb  2.8<L>  /  TBili  0.8  /  DBili  x   /  AST  18  /  ALT  28  /  AlkPhos  106  03-20-19 @ 08:47  TPro  5.0<L>  /  Alb  2.6<L>  /  TBili  0.5  /  DBili  x   /  AST  31  /  ALT  34  /  AlkPhos  111  03-19-19 @ 07:46  TPro  5.9<L>  /  Alb  3.4<L>  /  TBili  0.5  /  DBili  x   /  AST  45<H>  /  ALT  58<H>  /  AlkPhos  159<H>  03-18-19 @ 15:30          Culture - Genital (collected 03-18-19 @ 17:13)  Source: .Genital Vulva  Final Report (03-20-19 @ 18:21):    Few Routine vaginal kinsey    Surgical wound Cx pending    MEDICATIONS  (STANDING):  ATENolol  Tablet 25 milliGRAM(s) Oral daily  atorvastatin 40 milliGRAM(s) Oral at bedtime  aztreonam  IVPB 2000 milliGRAM(s) IV Intermittent every 12 hours  clopidogrel Tablet 75 milliGRAM(s) Oral daily  escitalopram 10 milliGRAM(s) Oral daily  heparin  Injectable 5000 Unit(s) SubCutaneous every 8 hours  lactated ringers. 1000 milliLiter(s) (75 mL/Hr) IV Continuous <Continuous>  losartan 50 milliGRAM(s) Oral daily  metroNIDAZOLE  IVPB 500 milliGRAM(s) IV Intermittent every 8 hours  pantoprazole    Tablet 40 milliGRAM(s) Oral before breakfast  vancomycin  IVPB 1000 milliGRAM(s) IV Intermittent every 12 hours    MEDICATIONS  (PRN):  acetaminophen   Tablet .. 650 milliGRAM(s) Oral every 6 hours PRN Mild Pain (1 - 3)  ibuprofen  Tablet. 600 milliGRAM(s) Oral every 8 hours PRN Moderate Pain (4 - 6)  morphine  - Injectable 1 milliGRAM(s) IV Push every 6 hours PRN Severe Pain (7 - 10)

## 2019-03-21 NOTE — PROGRESS NOTE ADULT - SUBJECTIVE AND OBJECTIVE BOX
BENITEZ FOX  [id number]  89y Female    Indication for ICU admission: s/p extensive I and D of foul smelling purulent and necrotic tissue of labia and vulva    Admit Date:03-19-19  ICU Date: 3/20/19  OR Date: 3/20/19    Levaquin (Unknown)  penicillin (Hives)  sulfa drugs (Hives)    PAST MEDICAL & SURGICAL HISTORY:  Atrial fibrillation  Stented coronary artery  Cataract  Breast cancer  S/P lumpectomy, right breast  H/O total knee replacement, left    Home Medications:  atenolol 25 mg oral tablet: 1 tab(s) orally once a day (18 Mar 2019 13:49)  atorvastatin 40 mg oral tablet: 1 tab(s) orally once a day (18 Mar 2019 13:49)  candesartan 16 mg oral tablet: 1 tab(s) orally once a day (18 Mar 2019 13:49)  chlorthalidone 25 mg oral tablet: 1 tab(s) orally once a day (18 Mar 2019 13:49)  Eliquis 2.5 mg oral tablet: 1 tab(s) orally 2 times a day (18 Mar 2019 13:49)  Lexapro 10 mg oral tablet: 1 tab(s) orally once a day (18 Mar 2019 13:49)  Plavix 75 mg oral tablet: 1 tab(s) orally once a day (18 Mar 2019 13:49)        24HRS EVENT:  POD1    Neurologic: pain control with tylenol and motrin prn, morphine 1mg q6h if needed.   Resumed Lexapro  Respiratory: on 3L NC wean as tolerated  Cardiovascular: h/o HTN, HLD, AFib, CAD/PCI, continue Atenolol, Losartan, and Atorvastatin, back on Plavix.  Eliquis on hold for now due to possible further debridements, awaiting Burn c/s.  Follopw up 3 sets of CE.  Gastrointestinal/Nutrition: regular diet, PPI, LR @ 75/hr.  IVL once tolerating diet  Genitourinary/Renal: strict I and Os, repleted KCL for hypokalemia, K3.4  Hematologic: h/h stable post-op, heparin sq started, full AC with Eliquis on hold for now, f/up if further debridements needed.  Infectious Disease: WBC 27 post-op, monitor fever curve, continue Vanco, Aztreonam and Flagyl for extensive labile infection  Endocrine: -156        DVT PTX: Hep sq    GI PTX: PPI    ***Tubes/Lines/Drains  ***  Peripheral IV    Urinary Catheter		Indication: Strict I&O    Date Placed: 3/20/19      REVIEW OF SYSTEMS    [X ] A ten-point review of systems was otherwise negative except as noted.  [ ] Due to altered mental status/intubation, subjective information were not able to be obtained from the patient. History was obtained, to the extent possible, from review of the chart and collateral sources of information. BENITEZ FOX  [id number]  89y Female    Indication for ICU admission: s/p extensive I and D of foul smelling purulent and necrotic tissue of labia and vulva    Admit Date:03-19-19  ICU Date: 3/20/19  OR Date: 3/20/19    Levaquin (Unknown)  penicillin (Hives)  sulfa drugs (Hives)    PAST MEDICAL & SURGICAL HISTORY:  Atrial fibrillation  Stented coronary artery  Cataract  Breast cancer  S/P lumpectomy, right breast  H/O total knee replacement, left    Home Medications:  atenolol 25 mg oral tablet: 1 tab(s) orally once a day (18 Mar 2019 13:49)  atorvastatin 40 mg oral tablet: 1 tab(s) orally once a day (18 Mar 2019 13:49)  candesartan 16 mg oral tablet: 1 tab(s) orally once a day (18 Mar 2019 13:49)  chlorthalidone 25 mg oral tablet: 1 tab(s) orally once a day (18 Mar 2019 13:49)  Eliquis 2.5 mg oral tablet: 1 tab(s) orally 2 times a day (18 Mar 2019 13:49)  Lexapro 10 mg oral tablet: 1 tab(s) orally once a day (18 Mar 2019 13:49)  Plavix 75 mg oral tablet: 1 tab(s) orally once a day (18 Mar 2019 13:49)        24HRS EVENT:  POD1    Neurologic: pain control with tylenol and motrin prn, morphine 1mg q6h if needed.   Resumed Lexapro  Respiratory: on 3L NC wean as tolerated  Cardiovascular: h/o HTN, HLD, AFib, CAD/PCI, continue Atenolol, Losartan, and Atorvastatin, back on Plavix.  Eliquis on hold for now due to possible further debridements, awaiting Burn c/s.  Follopw up 3 sets of CE.  Gastrointestinal/Nutrition: regular diet, PPI, LR @ 75/hr.  IVL once tolerating diet  Genitourinary/Renal: strict I and Os, repleted KCL for hypokalemia, K3.4  Hematologic: h/h stable post-op, heparin sq started, full AC with Eliquis on hold for now, f/up if further debridements needed.  Infectious Disease: WBC 27 post-op, monitor fever curve, continue Vanco, Aztreonam and Flagyl for extensive labile infection  Endocrine: -156        DVT PTX: Hep sq    GI PTX: PPI    ***Tubes/Lines/Drains  ***  Peripheral IV    Urinary Catheter		Indication: Strict I&O    Date Placed: 3/20/19      REVIEW OF SYSTEMS    [X ] A ten-point review of systems was otherwise negative except as noted.  [ ] Due to altered mental status/intubation, subjective information were not able to be obtained from the patient. History was obtained, to the extent possible, from review of the chart and collateral sources of information.        Daily Height in cm: 157.48 (20 Mar 2019 13:33)    Daily     Diet, Regular (03-20-19 @ 16:56)      CURRENT MEDS:  Neurologic Medications  acetaminophen   Tablet .. 650 milliGRAM(s) Oral every 6 hours PRN Mild Pain (1 - 3)  escitalopram 10 milliGRAM(s) Oral daily  ibuprofen  Tablet. 600 milliGRAM(s) Oral every 8 hours PRN Moderate Pain (4 - 6)    Respiratory Medications    Cardiovascular Medications  ATENolol  Tablet 25 milliGRAM(s) Oral daily  losartan 50 milliGRAM(s) Oral daily    Gastrointestinal Medications  docusate sodium 100 milliGRAM(s) Oral two times a day  pantoprazole    Tablet 40 milliGRAM(s) Oral before breakfast  senna 2 Tablet(s) Oral at bedtime    Genitourinary Medications    Hematologic/Oncologic Medications  clopidogrel Tablet 75 milliGRAM(s) Oral daily  heparin  Injectable 5000 Unit(s) SubCutaneous every 8 hours    Antimicrobial/Immunologic Medications  aztreonam  IVPB 2000 milliGRAM(s) IV Intermittent every 12 hours  metroNIDAZOLE  IVPB 500 milliGRAM(s) IV Intermittent every 8 hours  vancomycin  IVPB 1000 milliGRAM(s) IV Intermittent every 12 hours    Endocrine/Metabolic Medications  atorvastatin 40 milliGRAM(s) Oral at bedtime    Topical/Other Medications      ICU Vital Signs Last 24 Hrs  T(C): 36.7 (21 Mar 2019 08:00), Max: 37.1 (20 Mar 2019 13:00)  T(F): 98 (21 Mar 2019 08:00), Max: 98.8 (20 Mar 2019 19:30)  HR: 86 (21 Mar 2019 08:00) (70 - 112)  BP: 114/66 (21 Mar 2019 08:00) (93/66 - 139/67)  BP(mean): 79 (21 Mar 2019 08:00) (65 - 104)  ABP: --  ABP(mean): --  RR: 16 (21 Mar 2019 08:00) (15 - 32)  SpO2: 98% (21 Mar 2019 08:00) (87% - 98%)      Adult Advanced Hemodynamics Last 24 Hrs  CVP(mm Hg): --  CVP(cm H2O): --  CO: --  CI: --  PA: --  PA(mean): --  PCWP: --  SVR: --  SVRI: --  PVR: --  PVRI: --          I&O's Summary    20 Mar 2019 07:01  -  21 Mar 2019 07:00  --------------------------------------------------------  IN: 1270 mL / OUT: 465 mL / NET: 805 mL    21 Mar 2019 07:01  -  21 Mar 2019 10:49  --------------------------------------------------------  IN: 600 mL / OUT: 240 mL / NET: 360 mL      I&O's Detail    20 Mar 2019 07:01  -  21 Mar 2019 07:00  --------------------------------------------------------  IN:    lactated ringers.: 525 mL    lactated ringers.: 270 mL    lactated ringers.: 375 mL    Oral Fluid: 100 mL  Total IN: 1270 mL    OUT:    Indwelling Catheter - Urethral: 465 mL  Total OUT: 465 mL    Total NET: 805 mL      21 Mar 2019 07:01  -  21 Mar 2019 10:49  --------------------------------------------------------  IN:    IV PiggyBack: 350 mL    lactated ringers.: 75 mL    Oral Fluid: 175 mL  Total IN: 600 mL    OUT:    Indwelling Catheter - Urethral: 240 mL  Total OUT: 240 mL    Total NET: 360 mL          PHYSICAL EXAM:    General/Neuro  RASS:   0          GCS:  15        Deficits:  alert & oriented x 3, no focal deficits  Pupils: PERRLA    Lungs: clear to auscultation, Normal expansion/effort.     Cardiovascular : S1, S2.  Regular rate and rhythm.  Peripheral edema   Cardiac Rhythm: Normal Sinus Rhythm    GI: Abdomen soft, Non-tender, Non-distended.      Extremities: Extremities warm, pink, well-perfused.     Derm: Good skin turgor, no skin breakdown.      : Suarez catheter in place. Vagina packed and covered with Tegaderm. Right labia appropriately tender. No bleeding or discharge noted.        CXR:     LABS:  CAPILLARY BLOOD GLUCOSE      POCT Blood Glucose.: 112 mg/dL (20 Mar 2019 12:09)                          10.5   26.22 )-----------( 229      ( 21 Mar 2019 04:23 )             32.1       03-21    142  |  103  |  35<H>  ----------------------------<  159<H>  4.5   |  26  |  <0.5<L>    Ca    8.2<L>      21 Mar 2019 04:23  Phos  4.1     03-21  Mg     2.1     03-21    TPro  5.1<L>  /  Alb  2.8<L>  /  TBili  0.8  /  DBili  x   /  AST  18  /  ALT  28  /  AlkPhos  106  03-20      PT/INR - ( 21 Mar 2019 04:23 )   PT: 19.00 sec;   INR: 1.66 ratio         PTT - ( 21 Mar 2019 04:23 )  PTT:27.7 sec  CARDIAC MARKERS ( 21 Mar 2019 04:23 )  x     / <0.01 ng/mL / 240 U/L / x     / 6.4 ng/mL          Culture - Genital (collected 18 Mar 2019 17:13)  Source: .Genital Vulva  Final Report (20 Mar 2019 18:21):    Few Routine vaginal kinsey

## 2019-03-22 LAB
ALBUMIN SERPL ELPH-MCNC: 2.3 G/DL — LOW (ref 3.5–5.2)
ALP SERPL-CCNC: 80 U/L — SIGNIFICANT CHANGE UP (ref 30–115)
ALT FLD-CCNC: 22 U/L — SIGNIFICANT CHANGE UP (ref 0–41)
ANION GAP SERPL CALC-SCNC: 11 MMOL/L — SIGNIFICANT CHANGE UP (ref 7–14)
ANION GAP SERPL CALC-SCNC: 9 MMOL/L — SIGNIFICANT CHANGE UP (ref 7–14)
AST SERPL-CCNC: 37 U/L — SIGNIFICANT CHANGE UP (ref 0–41)
BILIRUB SERPL-MCNC: 0.4 MG/DL — SIGNIFICANT CHANGE UP (ref 0.2–1.2)
BUN SERPL-MCNC: 32 MG/DL — HIGH (ref 10–20)
BUN SERPL-MCNC: 37 MG/DL — HIGH (ref 10–20)
CALCIUM SERPL-MCNC: 7.9 MG/DL — LOW (ref 8.5–10.1)
CALCIUM SERPL-MCNC: 8.1 MG/DL — LOW (ref 8.5–10.1)
CHLORIDE SERPL-SCNC: 102 MMOL/L — SIGNIFICANT CHANGE UP (ref 98–110)
CHLORIDE SERPL-SCNC: 102 MMOL/L — SIGNIFICANT CHANGE UP (ref 98–110)
CK MB CFR SERPL CALC: 3.8 NG/ML — SIGNIFICANT CHANGE UP (ref 0.6–6.3)
CK MB CFR SERPL CALC: 6.4 NG/ML — HIGH (ref 0.6–6.3)
CK SERPL-CCNC: 256 U/L — HIGH (ref 0–225)
CK SERPL-CCNC: 393 U/L — HIGH (ref 0–225)
CO2 SERPL-SCNC: 25 MMOL/L — SIGNIFICANT CHANGE UP (ref 17–32)
CO2 SERPL-SCNC: 27 MMOL/L — SIGNIFICANT CHANGE UP (ref 17–32)
CREAT SERPL-MCNC: 0.8 MG/DL — SIGNIFICANT CHANGE UP (ref 0.7–1.5)
CREAT SERPL-MCNC: 0.9 MG/DL — SIGNIFICANT CHANGE UP (ref 0.7–1.5)
GLUCOSE SERPL-MCNC: 113 MG/DL — HIGH (ref 70–99)
GLUCOSE SERPL-MCNC: 121 MG/DL — HIGH (ref 70–99)
HCT VFR BLD CALC: 31.5 % — LOW (ref 37–47)
HGB BLD-MCNC: 10.1 G/DL — LOW (ref 12–16)
MAGNESIUM SERPL-MCNC: 1.8 MG/DL — SIGNIFICANT CHANGE UP (ref 1.8–2.4)
MCHC RBC-ENTMCNC: 30.5 PG — SIGNIFICANT CHANGE UP (ref 27–31)
MCHC RBC-ENTMCNC: 32.1 G/DL — SIGNIFICANT CHANGE UP (ref 32–37)
MCV RBC AUTO: 95.2 FL — SIGNIFICANT CHANGE UP (ref 81–99)
NRBC # BLD: 0 /100 WBCS — SIGNIFICANT CHANGE UP (ref 0–0)
PHOSPHATE SERPL-MCNC: 3.3 MG/DL — SIGNIFICANT CHANGE UP (ref 2.1–4.9)
PLATELET # BLD AUTO: 261 K/UL — SIGNIFICANT CHANGE UP (ref 130–400)
POTASSIUM SERPL-MCNC: 3.7 MMOL/L — SIGNIFICANT CHANGE UP (ref 3.5–5)
POTASSIUM SERPL-MCNC: 3.8 MMOL/L — SIGNIFICANT CHANGE UP (ref 3.5–5)
POTASSIUM SERPL-SCNC: 3.7 MMOL/L — SIGNIFICANT CHANGE UP (ref 3.5–5)
POTASSIUM SERPL-SCNC: 3.8 MMOL/L — SIGNIFICANT CHANGE UP (ref 3.5–5)
PROT SERPL-MCNC: 4.3 G/DL — LOW (ref 6–8)
RBC # BLD: 3.31 M/UL — LOW (ref 4.2–5.4)
RBC # FLD: 14.8 % — HIGH (ref 11.5–14.5)
SODIUM SERPL-SCNC: 138 MMOL/L — SIGNIFICANT CHANGE UP (ref 135–146)
SODIUM SERPL-SCNC: 138 MMOL/L — SIGNIFICANT CHANGE UP (ref 135–146)
TROPONIN T SERPL-MCNC: <0.01 NG/ML — SIGNIFICANT CHANGE UP
TROPONIN T SERPL-MCNC: <0.01 NG/ML — SIGNIFICANT CHANGE UP
VANCOMYCIN TROUGH SERPL-MCNC: 19.3 UG/ML — HIGH (ref 5–10)
WBC # BLD: 16.76 K/UL — HIGH (ref 4.8–10.8)
WBC # FLD AUTO: 16.76 K/UL — HIGH (ref 4.8–10.8)

## 2019-03-22 RX ORDER — HEPARIN SODIUM 5000 [USP'U]/ML
5000 INJECTION INTRAVENOUS; SUBCUTANEOUS EVERY 12 HOURS
Qty: 0 | Refills: 0 | Status: DISCONTINUED | OUTPATIENT
Start: 2019-03-22 | End: 2019-03-22

## 2019-03-22 RX ORDER — VANCOMYCIN HCL 1 G
1000 VIAL (EA) INTRAVENOUS EVERY 12 HOURS
Qty: 0 | Refills: 0 | Status: DISCONTINUED | OUTPATIENT
Start: 2019-03-22 | End: 2019-03-22

## 2019-03-22 RX ORDER — TIGECYCLINE 50 MG/5ML
INJECTION, POWDER, LYOPHILIZED, FOR SOLUTION INTRAVENOUS
Qty: 0 | Refills: 0 | Status: DISCONTINUED | OUTPATIENT
Start: 2019-03-22 | End: 2019-03-25

## 2019-03-22 RX ORDER — APIXABAN 2.5 MG/1
2.5 TABLET, FILM COATED ORAL EVERY 12 HOURS
Qty: 0 | Refills: 0 | Status: DISCONTINUED | OUTPATIENT
Start: 2019-03-22 | End: 2019-03-23

## 2019-03-22 RX ORDER — TIGECYCLINE 50 MG/5ML
100 INJECTION, POWDER, LYOPHILIZED, FOR SOLUTION INTRAVENOUS ONCE
Qty: 0 | Refills: 0 | Status: COMPLETED | OUTPATIENT
Start: 2019-03-22 | End: 2019-03-22

## 2019-03-22 RX ORDER — TIGECYCLINE 50 MG/5ML
50 INJECTION, POWDER, LYOPHILIZED, FOR SOLUTION INTRAVENOUS EVERY 12 HOURS
Qty: 0 | Refills: 0 | Status: DISCONTINUED | OUTPATIENT
Start: 2019-03-22 | End: 2019-03-25

## 2019-03-22 RX ADMIN — SENNA PLUS 2 TABLET(S): 8.6 TABLET ORAL at 21:54

## 2019-03-22 RX ADMIN — TIGECYCLINE 105 MILLIGRAM(S): 50 INJECTION, POWDER, LYOPHILIZED, FOR SOLUTION INTRAVENOUS at 17:44

## 2019-03-22 RX ADMIN — SODIUM CHLORIDE 3 MILLILITER(S): 9 INJECTION INTRAMUSCULAR; INTRAVENOUS; SUBCUTANEOUS at 16:04

## 2019-03-22 RX ADMIN — SODIUM CHLORIDE 3 MILLILITER(S): 9 INJECTION INTRAMUSCULAR; INTRAVENOUS; SUBCUTANEOUS at 05:29

## 2019-03-22 RX ADMIN — Medication 50 MILLIGRAM(S): at 05:28

## 2019-03-22 RX ADMIN — PANTOPRAZOLE SODIUM 40 MILLIGRAM(S): 20 TABLET, DELAYED RELEASE ORAL at 06:16

## 2019-03-22 RX ADMIN — ESCITALOPRAM OXALATE 10 MILLIGRAM(S): 10 TABLET, FILM COATED ORAL at 11:23

## 2019-03-22 RX ADMIN — APIXABAN 2.5 MILLIGRAM(S): 2.5 TABLET, FILM COATED ORAL at 17:13

## 2019-03-22 RX ADMIN — SODIUM CHLORIDE 3 MILLILITER(S): 9 INJECTION INTRAMUSCULAR; INTRAVENOUS; SUBCUTANEOUS at 21:56

## 2019-03-22 RX ADMIN — Medication 100 MILLIGRAM(S): at 05:16

## 2019-03-22 RX ADMIN — ATENOLOL 25 MILLIGRAM(S): 25 TABLET ORAL at 05:16

## 2019-03-22 RX ADMIN — HEPARIN SODIUM 5000 UNIT(S): 5000 INJECTION INTRAVENOUS; SUBCUTANEOUS at 05:17

## 2019-03-22 RX ADMIN — TIGECYCLINE 110 MILLIGRAM(S): 50 INJECTION, POWDER, LYOPHILIZED, FOR SOLUTION INTRAVENOUS at 09:18

## 2019-03-22 RX ADMIN — Medication 650 MILLIGRAM(S): at 16:06

## 2019-03-22 RX ADMIN — Medication 100 MILLIGRAM(S): at 17:13

## 2019-03-22 RX ADMIN — CLOPIDOGREL BISULFATE 75 MILLIGRAM(S): 75 TABLET, FILM COATED ORAL at 11:23

## 2019-03-22 RX ADMIN — ATORVASTATIN CALCIUM 40 MILLIGRAM(S): 80 TABLET, FILM COATED ORAL at 21:54

## 2019-03-22 RX ADMIN — Medication 650 MILLIGRAM(S): at 17:44

## 2019-03-22 RX ADMIN — LOSARTAN POTASSIUM 50 MILLIGRAM(S): 100 TABLET, FILM COATED ORAL at 05:16

## 2019-03-22 NOTE — PHYSICAL THERAPY INITIAL EVALUATION ADULT - ADDITIONAL COMMENTS
Pt uses Rollator primarily at home. Pt also has rolling walker, Canes, and transport chair. Pt lives with dtg, 0 steps to enter / flight to 2nd floor and flight to basement but pt does not have to negotiate these steps.

## 2019-03-22 NOTE — PROGRESS NOTE ADULT - SUBJECTIVE AND OBJECTIVE BOX
BENITEZ FOX  89y, Female      OVERNIGHT EVENTS:    no fevers, no new complaints    VITALS:  T(F): 97.8, Max: 98.5 (03-21-19 @ 12:52)  HR: 94  BP: 129/77  RR: 18Vital Signs Last 24 Hrs  T(C): 36.6 (22 Mar 2019 06:42), Max: 36.9 (21 Mar 2019 12:52)  T(F): 97.8 (22 Mar 2019 06:42), Max: 98.5 (21 Mar 2019 12:52)  HR: 94 (22 Mar 2019 05:05) (85 - 94)  BP: 129/77 (22 Mar 2019 05:05) (108/53 - 131/59)  BP(mean): --  RR: 18 (22 Mar 2019 05:05) (16 - 18)  SpO2: 97% (22 Mar 2019 07:59) (95% - 97%)    TESTS & MEASUREMENTS:                        10.0   20.61 )-----------( 242      ( 21 Mar 2019 22:40 )             30.0     03-21    138  |  102  |  37<H>  ----------------------------<  121<H>  3.7   |  25  |  0.9    Ca    7.9<L>      21 Mar 2019 22:40  Phos  4.1     03-21  Mg     2.1     03-21    TPro  5.1<L>  /  Alb  2.8<L>  /  TBili  0.8  /  DBili  x   /  AST  18  /  ALT  28  /  AlkPhos  106  03-20    LIVER FUNCTIONS - ( 20 Mar 2019 08:47 )  Alb: 2.8 g/dL / Pro: 5.1 g/dL / ALK PHOS: 106 U/L / ALT: 28 U/L / AST: 18 U/L / GGT: x             Culture - Surgical Swab (collected 03-20-19 @ 17:11)  Source: .Surgical Swab rt. vulvar abscess  Preliminary Report (03-21-19 @ 18:05):    No growth    Culture - Genital (collected 03-18-19 @ 17:13)  Source: .Genital Vulva  Final Report (03-20-19 @ 18:21):    Few Routine vaginal kinsey            RADIOLOGY & ADDITIONAL TESTS:    ANTIBIOTICS:  tigecycline IVPB      tigecycline IVPB 100 milliGRAM(s) IV Intermittent once  tigecycline IVPB 50 milliGRAM(s) IV Intermittent every 12 hours English

## 2019-03-22 NOTE — PHYSICAL THERAPY INITIAL EVALUATION ADULT - PERTINENT HX OF CURRENT PROBLEM, REHAB EVAL
Pt adm for right labial pain and swelling. She also had a fall at home last wednesday 3/13 from medication that made her dizziness. Pt denies previous other falls.

## 2019-03-22 NOTE — PHYSICAL THERAPY INITIAL EVALUATION ADULT - IMPAIRMENTS FOUND, PT EVAL
gait, locomotion, and balance/gross motor/posture/ergonomics and body mechanics/joint integrity and mobility/muscle strength/aerobic capacity/endurance/cognitive impairment/fine motor/poor safety awareness/ROM

## 2019-03-22 NOTE — PHYSICAL THERAPY INITIAL EVALUATION ADULT - RANGE OF MOTION EXAMINATION, REHAB EVAL
RLE 80% Rom compared to LLE/bilateral upper extremity ROM was WFL (within functional limits)/Left LE ROM was WFL (within functional limits)

## 2019-03-22 NOTE — PROGRESS NOTE ADULT - SUBJECTIVE AND OBJECTIVE BOX
PGY4 Note:    Pt seen and examined at bedside. Denies chest pain, SOB, fever/chills, n/v, perineal or abdominal pain. Tolerating regular diet, has not ambulated.     Vital Signs Last 24 Hrs  T(C): 34.4 (22 Mar 2019 05:05), Max: 36.9 (21 Mar 2019 12:52)  T(F): 93.9 (22 Mar 2019 05:05), Max: 98.5 (21 Mar 2019 12:52)  HR: 94 (22 Mar 2019 05:05) (85 - 94)  BP: 129/77 (22 Mar 2019 05:05) (108/53 - 131/59)  BP(mean): 79 (21 Mar 2019 08:00) (79 - 79)  RR: 18 (22 Mar 2019 05:05) (16 - 18)  SpO2: 95% (22 Mar 2019 01:05) (95% - 98%)    Gen: NAD, AAOx3  Heart: s1s2, rrr  Lungs: ctab  Perineum: right labial wound - packing changed with moist kerlex, swelling of right labium majus significantly decreased, wound bed appears healthy, no purulent drainage, wound 10cm in length, 4cm deep, tracks upwards to mons additional 5cm superiorly, 4cm inferiorly   Ext: SCDs in place   Urine output (0329-0456): 900cc = 78cc/hr (min 36cc/hr)     LABS:                        10.0   20.61 )-----------( 242      ( 21 Mar 2019 22:40 )             30.0   03-21    138  |  102  |  37<H>  ----------------------------<  121<H>  3.7   |  25  |  0.9    Ca    7.9<L>      21 Mar 2019 22:40  Phos  4.1     03-21  Mg     2.1     03-21    TPro  5.1<L>  /  Alb  2.8<L>  /  TBili  0.8  /  DBili  x   /  AST  18  /  ALT  28  /  AlkPhos  106  03-20    LIVER FUNCTIONS - ( 20 Mar 2019 08:47 )  Alb: 2.8 g/dL / Pro: 5.1 g/dL / ALK PHOS: 106 U/L / ALT: 28 U/L / AST: 18 U/L / GGT: x           , CKMB 6.4, Troponin <0.01   Vancomycin trough 19.3    .Surgical Swab rt. vulvar abscess  03-20-19   No growth  --  --      .Genital Vulva  03-18-19   Few Routine vaginal kinsey  --  --    MEDICATIONS  (STANDING):  apixaban 2.5 milliGRAM(s) Oral every 12 hours  ATENolol  Tablet 25 milliGRAM(s) Oral daily  atorvastatin 40 milliGRAM(s) Oral at bedtime  aztreonam  IVPB 500 milliGRAM(s) IV Intermittent every 8 hours  clopidogrel Tablet 75 milliGRAM(s) Oral daily  docusate sodium 100 milliGRAM(s) Oral two times a day  escitalopram 10 milliGRAM(s) Oral daily  heparin  Injectable 5000 Unit(s) SubCutaneous every 8 hours  losartan 50 milliGRAM(s) Oral daily  metroNIDAZOLE  IVPB 500 milliGRAM(s) IV Intermittent every 12 hours  pantoprazole    Tablet 40 milliGRAM(s) Oral before breakfast  senna 2 Tablet(s) Oral at bedtime  sodium chloride 0.9% lock flush 3 milliLiter(s) IV Push every 8 hours  vancomycin  IVPB 1000 milliGRAM(s) IV Intermittent every 12 hours    MEDICATIONS  (PRN):  acetaminophen   Tablet .. 650 milliGRAM(s) Oral every 6 hours PRN Mild Pain (1 - 3)  ibuprofen  Tablet. 600 milliGRAM(s) Oral every 8 hours PRN Moderate Pain (4 - 6)

## 2019-03-22 NOTE — PHYSICAL THERAPY INITIAL EVALUATION ADULT - GENERAL OBSERVATIONS, REHAB EVAL
Pt seen [phone] for a total of 45 minutes. Pt encountered sitting in chair, No apparent distress + Chair alarm, +dtg present bedside + stark

## 2019-03-22 NOTE — PROGRESS NOTE ADULT - ASSESSMENT
89yo with h/o multiple comorbidities, s/p I&D, sharp debridement of right labial abscess, EBL 150cc, POD2, on IV antibiotics.  -continues to be afebrile  -f/u AM labs  -f/u final surgical wound culture  -appreciate ID consult - however there is interaction between escitalopram and linezolid, pt continued on vancomycin for now  -appreciate Burn consult - will continue wet-to-dry dressings BID for now and possible secondary closure next week, restart eliquis  -Pain management PRN  -Suarez to remain in place    Dr. Rahman to be made aware. 89yo with h/o multiple comorbidities, s/p I&D, sharp debridement of right labial abscess, EBL 150cc, POD2, on IV antibiotics.  -continues to be afebrile  -f/u AM labs  -f/u final surgical wound culture  -appreciate ID consult - however there is interaction between escitalopram and linezolid, pt continued on vancomycin for now  -appreciate Burn consult - will continue wet-to-dry dressings BID for now and possible secondary closure next week, restart eliquis  -Pain management PRN  -Suarez to remain in place    Dr. Rahman to be made aware.     Addendum: Per verbal from Dr. Metcalf, D/C all antibiotics and change to [id number] x1 then 50mg Q12H.

## 2019-03-22 NOTE — PHYSICAL THERAPY INITIAL EVALUATION ADULT - CRITERIA FOR SKILLED THERAPEUTIC INTERVENTIONS
rehab potential/predicted duration of therapy intervention/anticipated equipment needs at discharge/functional limitations in following categories/impairments found/risk reduction/prevention/therapy frequency

## 2019-03-22 NOTE — PROGRESS NOTE ADULT - ASSESSMENT
IMPRESSION:  Infected R labial subcutaneous abscess.  Abscess cultures 3/21 NTD    RECOMMENDATIONS:  Tigecycline 100 mg iv x once then 50 mg iv q12h  D/c other ABx

## 2019-03-23 LAB
ANION GAP SERPL CALC-SCNC: 13 MMOL/L — SIGNIFICANT CHANGE UP (ref 7–14)
BUN SERPL-MCNC: 37 MG/DL — HIGH (ref 10–20)
CALCIUM SERPL-MCNC: 8.2 MG/DL — LOW (ref 8.5–10.1)
CHLORIDE SERPL-SCNC: 103 MMOL/L — SIGNIFICANT CHANGE UP (ref 98–110)
CO2 SERPL-SCNC: 24 MMOL/L — SIGNIFICANT CHANGE UP (ref 17–32)
CREAT SERPL-MCNC: 0.8 MG/DL — SIGNIFICANT CHANGE UP (ref 0.7–1.5)
GLUCOSE SERPL-MCNC: 97 MG/DL — SIGNIFICANT CHANGE UP (ref 70–99)
HCT VFR BLD CALC: 32.4 % — LOW (ref 37–47)
HGB BLD-MCNC: 10.6 G/DL — LOW (ref 12–16)
MAGNESIUM SERPL-MCNC: 1.8 MG/DL — SIGNIFICANT CHANGE UP (ref 1.8–2.4)
MCHC RBC-ENTMCNC: 30.7 PG — SIGNIFICANT CHANGE UP (ref 27–31)
MCHC RBC-ENTMCNC: 32.7 G/DL — SIGNIFICANT CHANGE UP (ref 32–37)
MCV RBC AUTO: 93.9 FL — SIGNIFICANT CHANGE UP (ref 81–99)
NRBC # BLD: 0 /100 WBCS — SIGNIFICANT CHANGE UP (ref 0–0)
PHOSPHATE SERPL-MCNC: 3.7 MG/DL — SIGNIFICANT CHANGE UP (ref 2.1–4.9)
PLATELET # BLD AUTO: 307 K/UL — SIGNIFICANT CHANGE UP (ref 130–400)
POTASSIUM SERPL-MCNC: 4.2 MMOL/L — SIGNIFICANT CHANGE UP (ref 3.5–5)
POTASSIUM SERPL-SCNC: 4.2 MMOL/L — SIGNIFICANT CHANGE UP (ref 3.5–5)
RBC # BLD: 3.45 M/UL — LOW (ref 4.2–5.4)
RBC # FLD: 14.7 % — HIGH (ref 11.5–14.5)
SODIUM SERPL-SCNC: 140 MMOL/L — SIGNIFICANT CHANGE UP (ref 135–146)
WBC # BLD: 14.71 K/UL — HIGH (ref 4.8–10.8)
WBC # FLD AUTO: 14.71 K/UL — HIGH (ref 4.8–10.8)

## 2019-03-23 RX ORDER — HEPARIN SODIUM 5000 [USP'U]/ML
5000 INJECTION INTRAVENOUS; SUBCUTANEOUS EVERY 12 HOURS
Qty: 0 | Refills: 0 | Status: DISCONTINUED | OUTPATIENT
Start: 2019-03-24 | End: 2019-03-24

## 2019-03-23 RX ADMIN — SODIUM CHLORIDE 3 MILLILITER(S): 9 INJECTION INTRAMUSCULAR; INTRAVENOUS; SUBCUTANEOUS at 21:46

## 2019-03-23 RX ADMIN — SENNA PLUS 2 TABLET(S): 8.6 TABLET ORAL at 21:48

## 2019-03-23 RX ADMIN — TIGECYCLINE 105 MILLIGRAM(S): 50 INJECTION, POWDER, LYOPHILIZED, FOR SOLUTION INTRAVENOUS at 06:24

## 2019-03-23 RX ADMIN — TIGECYCLINE 105 MILLIGRAM(S): 50 INJECTION, POWDER, LYOPHILIZED, FOR SOLUTION INTRAVENOUS at 17:25

## 2019-03-23 RX ADMIN — SODIUM CHLORIDE 3 MILLILITER(S): 9 INJECTION INTRAMUSCULAR; INTRAVENOUS; SUBCUTANEOUS at 17:26

## 2019-03-23 RX ADMIN — CLOPIDOGREL BISULFATE 75 MILLIGRAM(S): 75 TABLET, FILM COATED ORAL at 11:32

## 2019-03-23 RX ADMIN — PANTOPRAZOLE SODIUM 40 MILLIGRAM(S): 20 TABLET, DELAYED RELEASE ORAL at 06:24

## 2019-03-23 RX ADMIN — ESCITALOPRAM OXALATE 10 MILLIGRAM(S): 10 TABLET, FILM COATED ORAL at 11:32

## 2019-03-23 RX ADMIN — LOSARTAN POTASSIUM 50 MILLIGRAM(S): 100 TABLET, FILM COATED ORAL at 06:24

## 2019-03-23 RX ADMIN — Medication 100 MILLIGRAM(S): at 06:24

## 2019-03-23 RX ADMIN — APIXABAN 2.5 MILLIGRAM(S): 2.5 TABLET, FILM COATED ORAL at 17:25

## 2019-03-23 RX ADMIN — APIXABAN 2.5 MILLIGRAM(S): 2.5 TABLET, FILM COATED ORAL at 06:24

## 2019-03-23 RX ADMIN — ATORVASTATIN CALCIUM 40 MILLIGRAM(S): 80 TABLET, FILM COATED ORAL at 21:48

## 2019-03-23 RX ADMIN — ATENOLOL 25 MILLIGRAM(S): 25 TABLET ORAL at 06:24

## 2019-03-23 RX ADMIN — SODIUM CHLORIDE 3 MILLILITER(S): 9 INJECTION INTRAMUSCULAR; INTRAVENOUS; SUBCUTANEOUS at 06:25

## 2019-03-23 NOTE — PROGRESS NOTE ADULT - SUBJECTIVE AND OBJECTIVE BOX
GYN Progress Note  Pt seen and examined at bedside in NAD. Denies any complaints, no acute events overnight. Pain well controlled at this time.     Physical exam:    Vital Signs Last 24 Hrs  T(F): 97.4 (23 Mar 2019 05:17), Max: 97.9 (22 Mar 2019 21:00)  HR: 85 (23 Mar 2019 05:17) (71 - 87)  BP: 139/65 (23 Mar 2019 05:17) (115/78 - 143/81)  RR: 18 (23 Mar 2019 05:17) (18 - 18)  SpO2: 96% (22 Mar 2019 13:55) (96% - 96%)    Gen: alert, oriented  CVS: RRR  Lungs: CTAB  Upper extremities: 3+ pitting edema  Abdomen: Soft, nontender, non distended. No rebound, rigidity or guarding. Incision clean, dry, intact, packing changed, wet to dry dressing  Perineum: no active bleeding. Suarez in place, clear urine  Ext: No calf tenderness, SCD's in place    Diet: regular diet     MEDICATIONS  (STANDING):  apixaban 2.5 milliGRAM(s) Oral every 12 hours  ATENolol  Tablet 25 milliGRAM(s) Oral daily  atorvastatin 40 milliGRAM(s) Oral at bedtime  clopidogrel Tablet 75 milliGRAM(s) Oral daily  docusate sodium 100 milliGRAM(s) Oral two times a day  escitalopram 10 milliGRAM(s) Oral daily  losartan 50 milliGRAM(s) Oral daily  pantoprazole    Tablet 40 milliGRAM(s) Oral before breakfast  senna 2 Tablet(s) Oral at bedtime  sodium chloride 0.9% lock flush 3 milliLiter(s) IV Push every 8 hours  tigecycline IVPB      tigecycline IVPB 50 milliGRAM(s) IV Intermittent every 12 hours    MEDICATIONS  (PRN):  acetaminophen   Tablet .. 650 milliGRAM(s) Oral every 6 hours PRN Mild Pain (1 - 3)  ibuprofen  Tablet. 600 milliGRAM(s) Oral every 8 hours PRN Moderate Pain (4 - 6)      LABS:                        10.6   14.71 )-----------( 307      ( 23 Mar 2019 05:59 )             32.4                         10.1   16.76 )-----------( 261      ( 22 Mar 2019 08:34 )             31.5                         10.0   20.61 )-----------( 242      ( 21 Mar 2019 22:40 )             30.0     Magnesium, Serum: 1.8 mg/dL (03-23 @ 05:59)    03-23-19 @ 05:59      140  |  103  |  37<H>  ----------------------------<  97  4.2   |  24  |  0.8    03-22-19 @ 08:34      138  |  102  |  32<H>  ----------------------------<  113<H>  3.8   |  27  |  0.8    03-21-19 @ 22:40      138  |  102  |  37<H>  ----------------------------<  121<H>  3.7   |  25  |  0.9    03-21-19 @ 04:23      142  |  103  |  35<H>  ----------------------------<  159<H>  4.5   |  26  |  <0.5<L>    03-20-19 @ 21:42      137  |  99  |  33<H>  ----------------------------<  221<H>  3.4<L>   |  24  |  1.0    03-20-19 @ 08:47      139  |  103  |  33<H>  ----------------------------<  140<H>  3.1<L>   |  24  |  0.9        Ca    8.2<L>      23 Mar 2019 05:59  Ca    8.1<L>      22 Mar 2019 08:34  Ca    7.9<L>      21 Mar 2019 22:40  Ca    8.2<L>      21 Mar 2019 04:23  Ca    8.4<L>      20 Mar 2019 21:42  Ca    8.4<L>      20 Mar 2019 08:47  Phos  3.7     03-23  Phos  3.3     03-22  Phos  4.1     03-21  Phos  4.0     03-20  Phos  tnp     03-20  Mg     1.8     03-23  Mg     1.8     03-22  Mg     2.1     03-21  Mg     2.0     03-20  Mg     tnp     03-20  Mg     2.2     03-20    TPro  4.3<L>  /  Alb  2.3<L>  /  TBili  0.4  /  DBili  x   /  AST  37  /  ALT  22  /  AlkPhos  80  03-22-19 @ 08:34  TPro  5.1<L>  /  Alb  2.8<L>  /  TBili  0.8  /  DBili  x   /  AST  18  /  ALT  28  /  AlkPhos  106  03-20-19 @ 08:47          Culture - Surgical Swab (collected 03-20-19 @ 17:11)  Source: .Surgical Swab rt. vulvar abscess  Preliminary Report (03-23-19 @ 02:07):    Few Gram Negative Rods    Rule Out Anaerobes    Culture - Genital (collected 03-18-19 @ 17:13)  Source: .Genital Vulva  Final Report (03-20-19 @ 18:21):    Few Routine vaginal kinsey          03-22-19 @ 07:01  -  03-23-19 @ 07:00  --------------------------------------------------------  IN: 910 mL / OUT: 2200 mL / NET: -1290 mL GYN Progress Note  Pt seen and examined at bedside in NAD. Denies any complaints, no acute events overnight. Pain well controlled at this time.     Physical exam:    Vital Signs Last 24 Hrs  T(F): 97.4 (23 Mar 2019 05:17), Max: 97.9 (22 Mar 2019 21:00)  HR: 85 (23 Mar 2019 05:17) (71 - 87)  BP: 139/65 (23 Mar 2019 05:17) (115/78 - 143/81)  RR: 18 (23 Mar 2019 05:17) (18 - 18)  SpO2: 96% (22 Mar 2019 13:55) (96% - 96%)    Gen: alert, oriented  CVS: RRR  Lungs: CTAB  Upper extremities: 3+ pitting edema over hands, no palpable mass or bruising  Abdomen: Soft, nontender, non distended. No rebound, rigidity or guarding. Incision clean, dry, intact, packing changed, wet to dry dressing  Perineum: no active bleeding. Suarez in place, clear urine  Ext: No calf tenderness, SCD's in place    Diet: regular diet     MEDICATIONS  (STANDING):  apixaban 2.5 milliGRAM(s) Oral every 12 hours  ATENolol  Tablet 25 milliGRAM(s) Oral daily  atorvastatin 40 milliGRAM(s) Oral at bedtime  clopidogrel Tablet 75 milliGRAM(s) Oral daily  docusate sodium 100 milliGRAM(s) Oral two times a day  escitalopram 10 milliGRAM(s) Oral daily  losartan 50 milliGRAM(s) Oral daily  pantoprazole    Tablet 40 milliGRAM(s) Oral before breakfast  senna 2 Tablet(s) Oral at bedtime  sodium chloride 0.9% lock flush 3 milliLiter(s) IV Push every 8 hours  tigecycline IVPB      tigecycline IVPB 50 milliGRAM(s) IV Intermittent every 12 hours    MEDICATIONS  (PRN):  acetaminophen   Tablet .. 650 milliGRAM(s) Oral every 6 hours PRN Mild Pain (1 - 3)  ibuprofen  Tablet. 600 milliGRAM(s) Oral every 8 hours PRN Moderate Pain (4 - 6)      LABS:                        10.6   14.71 )-----------( 307      ( 23 Mar 2019 05:59 )             32.4                         10.1   16.76 )-----------( 261      ( 22 Mar 2019 08:34 )             31.5                         10.0   20.61 )-----------( 242      ( 21 Mar 2019 22:40 )             30.0     Magnesium, Serum: 1.8 mg/dL (03-23 @ 05:59)    03-23-19 @ 05:59      140  |  103  |  37<H>  ----------------------------<  97  4.2   |  24  |  0.8    03-22-19 @ 08:34      138  |  102  |  32<H>  ----------------------------<  113<H>  3.8   |  27  |  0.8    03-21-19 @ 22:40      138  |  102  |  37<H>  ----------------------------<  121<H>  3.7   |  25  |  0.9    03-21-19 @ 04:23      142  |  103  |  35<H>  ----------------------------<  159<H>  4.5   |  26  |  <0.5<L>    03-20-19 @ 21:42      137  |  99  |  33<H>  ----------------------------<  221<H>  3.4<L>   |  24  |  1.0    03-20-19 @ 08:47      139  |  103  |  33<H>  ----------------------------<  140<H>  3.1<L>   |  24  |  0.9        Ca    8.2<L>      23 Mar 2019 05:59  Ca    8.1<L>      22 Mar 2019 08:34  Ca    7.9<L>      21 Mar 2019 22:40  Ca    8.2<L>      21 Mar 2019 04:23  Ca    8.4<L>      20 Mar 2019 21:42  Ca    8.4<L>      20 Mar 2019 08:47  Phos  3.7     03-23  Phos  3.3     03-22  Phos  4.1     03-21  Phos  4.0     03-20  Phos  tnp     03-20  Mg     1.8     03-23  Mg     1.8     03-22  Mg     2.1     03-21  Mg     2.0     03-20  Mg     tnp     03-20  Mg     2.2     03-20    TPro  4.3<L>  /  Alb  2.3<L>  /  TBili  0.4  /  DBili  x   /  AST  37  /  ALT  22  /  AlkPhos  80  03-22-19 @ 08:34  TPro  5.1<L>  /  Alb  2.8<L>  /  TBili  0.8  /  DBili  x   /  AST  18  /  ALT  28  /  AlkPhos  106  03-20-19 @ 08:47          Culture - Surgical Swab (collected 03-20-19 @ 17:11)  Source: .Surgical Swab rt. vulvar abscess  Preliminary Report (03-23-19 @ 02:07):    Few Gram Negative Rods    Rule Out Anaerobes    Culture - Genital (collected 03-18-19 @ 17:13)  Source: .Genital Vulva  Final Report (03-20-19 @ 18:21):    Few Routine vaginal kinsey          03-22-19 @ 07:01  -  03-23-19 @ 07:00  --------------------------------------------------------  IN: 910 mL / OUT: 2200 mL / NET: -1290 mL

## 2019-03-23 NOTE — PROGRESS NOTE ADULT - ASSESSMENT
89yo with h/o multiple comorbidities, s/p I&D, sharp debridement of right labial abscess, EBL 150cc, POD3, on IV antibiotics.  -continues to be afebrile  -f/u AM labs  -f/u final surgical wound culture  -appreciate ID consult: changed antibiotics to tigecycline 50mg q 12 hr  -appreciate Burn consult - will continue wet-to-dry dressings BID for now and possible secondary closure on 3/25  -Pain management PRN  -Suarez to remain in place  - physical therapy to ambulate patient  - perineal care  - plan to d/c eliquis after PM dose and will start heparin SQ BID    D/w Dr. Rodriguez 89yo with h/o multiple comorbidities, s/p I&D, sharp debridement of right labial abscess, EBL 150cc, POD3, on IV antibiotics.  -continues to be afebrile  -f/u final surgical wound culture  -appreciate ID consult: changed antibiotics to tigecycline 50mg q 12 hr  -appreciate Burn consult - will continue wet-to-dry dressings BID for now and possible secondary closure on 3/25  -Pain management PRN  -Suarez to remain in place  - physical therapy to ambulate patient  - perineal care  - plan to d/c eliquis after PM dose and will start heparin SQ BID  - will continue to monitor UE swelling, if continues or worsening will obtain UE duplex  D/w Dr. Rodriguez

## 2019-03-24 LAB
ANION GAP SERPL CALC-SCNC: 8 MMOL/L — SIGNIFICANT CHANGE UP (ref 7–14)
BLD GP AB SCN SERPL QL: SIGNIFICANT CHANGE UP
BUN SERPL-MCNC: 33 MG/DL — HIGH (ref 10–20)
CALCIUM SERPL-MCNC: 8.5 MG/DL — SIGNIFICANT CHANGE UP (ref 8.5–10.1)
CHLORIDE SERPL-SCNC: 102 MMOL/L — SIGNIFICANT CHANGE UP (ref 98–110)
CO2 SERPL-SCNC: 28 MMOL/L — SIGNIFICANT CHANGE UP (ref 17–32)
CREAT SERPL-MCNC: 0.8 MG/DL — SIGNIFICANT CHANGE UP (ref 0.7–1.5)
CULTURE RESULTS: SIGNIFICANT CHANGE UP
GLUCOSE SERPL-MCNC: 96 MG/DL — SIGNIFICANT CHANGE UP (ref 70–99)
HCT VFR BLD CALC: 33.2 % — LOW (ref 37–47)
HGB BLD-MCNC: 10.7 G/DL — LOW (ref 12–16)
INR BLD: 1.18 RATIO — SIGNIFICANT CHANGE UP (ref 0.65–1.3)
MAGNESIUM SERPL-MCNC: 1.9 MG/DL — SIGNIFICANT CHANGE UP (ref 1.8–2.4)
MCHC RBC-ENTMCNC: 30.6 PG — SIGNIFICANT CHANGE UP (ref 27–31)
MCHC RBC-ENTMCNC: 32.2 G/DL — SIGNIFICANT CHANGE UP (ref 32–37)
MCV RBC AUTO: 94.9 FL — SIGNIFICANT CHANGE UP (ref 81–99)
NRBC # BLD: 0 /100 WBCS — SIGNIFICANT CHANGE UP (ref 0–0)
PLATELET # BLD AUTO: 347 K/UL — SIGNIFICANT CHANGE UP (ref 130–400)
POTASSIUM SERPL-MCNC: 4.8 MMOL/L — SIGNIFICANT CHANGE UP (ref 3.5–5)
POTASSIUM SERPL-SCNC: 4.8 MMOL/L — SIGNIFICANT CHANGE UP (ref 3.5–5)
PROTHROM AB SERPL-ACNC: 13.5 SEC — HIGH (ref 9.95–12.87)
RBC # BLD: 3.5 M/UL — LOW (ref 4.2–5.4)
RBC # FLD: 14.8 % — HIGH (ref 11.5–14.5)
SODIUM SERPL-SCNC: 138 MMOL/L — SIGNIFICANT CHANGE UP (ref 135–146)
SPECIMEN SOURCE: SIGNIFICANT CHANGE UP
TYPE + AB SCN PNL BLD: SIGNIFICANT CHANGE UP
WBC # BLD: 15.85 K/UL — HIGH (ref 4.8–10.8)
WBC # FLD AUTO: 15.85 K/UL — HIGH (ref 4.8–10.8)

## 2019-03-24 RX ORDER — SODIUM CHLORIDE 9 MG/ML
1000 INJECTION INTRAMUSCULAR; INTRAVENOUS; SUBCUTANEOUS
Qty: 0 | Refills: 0 | Status: DISCONTINUED | OUTPATIENT
Start: 2019-03-24 | End: 2019-03-25

## 2019-03-24 RX ADMIN — SODIUM CHLORIDE 3 MILLILITER(S): 9 INJECTION INTRAMUSCULAR; INTRAVENOUS; SUBCUTANEOUS at 21:56

## 2019-03-24 RX ADMIN — Medication 100 MILLIGRAM(S): at 18:29

## 2019-03-24 RX ADMIN — ATORVASTATIN CALCIUM 40 MILLIGRAM(S): 80 TABLET, FILM COATED ORAL at 22:00

## 2019-03-24 RX ADMIN — SODIUM CHLORIDE 3 MILLILITER(S): 9 INJECTION INTRAMUSCULAR; INTRAVENOUS; SUBCUTANEOUS at 05:26

## 2019-03-24 RX ADMIN — Medication 100 MILLIGRAM(S): at 05:26

## 2019-03-24 RX ADMIN — HEPARIN SODIUM 5000 UNIT(S): 5000 INJECTION INTRAVENOUS; SUBCUTANEOUS at 18:29

## 2019-03-24 RX ADMIN — SODIUM CHLORIDE 3 MILLILITER(S): 9 INJECTION INTRAMUSCULAR; INTRAVENOUS; SUBCUTANEOUS at 16:16

## 2019-03-24 RX ADMIN — ATENOLOL 25 MILLIGRAM(S): 25 TABLET ORAL at 05:25

## 2019-03-24 RX ADMIN — PANTOPRAZOLE SODIUM 40 MILLIGRAM(S): 20 TABLET, DELAYED RELEASE ORAL at 05:25

## 2019-03-24 RX ADMIN — LOSARTAN POTASSIUM 50 MILLIGRAM(S): 100 TABLET, FILM COATED ORAL at 05:25

## 2019-03-24 RX ADMIN — ESCITALOPRAM OXALATE 10 MILLIGRAM(S): 10 TABLET, FILM COATED ORAL at 12:46

## 2019-03-24 RX ADMIN — SENNA PLUS 2 TABLET(S): 8.6 TABLET ORAL at 22:00

## 2019-03-24 RX ADMIN — TIGECYCLINE 105 MILLIGRAM(S): 50 INJECTION, POWDER, LYOPHILIZED, FOR SOLUTION INTRAVENOUS at 18:30

## 2019-03-24 RX ADMIN — TIGECYCLINE 105 MILLIGRAM(S): 50 INJECTION, POWDER, LYOPHILIZED, FOR SOLUTION INTRAVENOUS at 05:24

## 2019-03-24 RX ADMIN — HEPARIN SODIUM 5000 UNIT(S): 5000 INJECTION INTRAVENOUS; SUBCUTANEOUS at 05:26

## 2019-03-24 RX ADMIN — CLOPIDOGREL BISULFATE 75 MILLIGRAM(S): 75 TABLET, FILM COATED ORAL at 12:46

## 2019-03-24 NOTE — PROGRESS NOTE ADULT - ASSESSMENT
89yo with h/o multiple comorbidities, s/p I&D, sharp debridement of right labial abscess, EBL 150cc, POD4, wound currently open and packed with kerlex, on IV antibiotics.  -wet-to-dry dressing changes BID  -continue IV Tigecycline per ID  -plan for secondary closure on 3/25 with BURN  -NPO from midnight with IV fluids  -AM labs with coags and T&S ordered  -Pain management PRN  -Suarez to remain in place  -f/u final surgical wound culture     Dr. Delaney aware.

## 2019-03-24 NOTE — PROGRESS NOTE ADULT - SUBJECTIVE AND OBJECTIVE BOX
Patient is a 89y old  Female who presents with a chief complaint of labial abscess (24 Mar 2019 10:22)    No acute events overnight    Vital Signs Last 24 Hrs  T(C): 36.3 (24 Mar 2019 06:36), Max: 37.1 (23 Mar 2019 12:45)  T(F): 97.4 (24 Mar 2019 06:36), Max: 98.7 (23 Mar 2019 12:45)  HR: 87 (24 Mar 2019 06:36) (77 - 87)  BP: 160/67 (24 Mar 2019 06:36) (123/60 - 160/67)  BP(mean): --  RR: 18 (24 Mar 2019 06:36) (16 - 18)  SpO2: --  I&O's Summary    23 Mar 2019 07:01  -  24 Mar 2019 07:00  --------------------------------------------------------  IN: 420 mL / OUT: 1975 mL / NET: -1555 mL        Meds:  MEDICATIONS  (STANDING):  ATENolol  Tablet 25 milliGRAM(s) Oral daily  atorvastatin 40 milliGRAM(s) Oral at bedtime  clopidogrel Tablet 75 milliGRAM(s) Oral daily  docusate sodium 100 milliGRAM(s) Oral two times a day  escitalopram 10 milliGRAM(s) Oral daily  heparin  Injectable 5000 Unit(s) SubCutaneous every 12 hours  losartan 50 milliGRAM(s) Oral daily  pantoprazole    Tablet 40 milliGRAM(s) Oral before breakfast  senna 2 Tablet(s) Oral at bedtime  sodium chloride 0.9% lock flush 3 milliLiter(s) IV Push every 8 hours  sodium chloride 0.9%. 1000 milliLiter(s) (75 mL/Hr) IV Continuous <Continuous>  tigecycline IVPB      tigecycline IVPB 50 milliGRAM(s) IV Intermittent every 12 hours    MEDICATIONS  (PRN):  acetaminophen   Tablet .. 650 milliGRAM(s) Oral every 6 hours PRN Mild Pain (1 - 3)  ibuprofen  Tablet. 600 milliGRAM(s) Oral every 8 hours PRN Moderate Pain (4 - 6)          Labs:                        10.7   15.85 )-----------( 347      ( 24 Mar 2019 08:37 )             33.2     03-24    138  |  102  |  33<H>  ----------------------------<  96  4.8   |  28  |  0.8    Ca    8.5      24 Mar 2019 08:37  Phos  3.7     03-23  Mg     1.9     03-24          PE: AAO x 3  Full thickness wound to rt labia with granulation tissue  serosang dc

## 2019-03-24 NOTE — PROGRESS NOTE ADULT - ASSESSMENT
89yo P2, h/o CAD with stent x3, a-fib, HTN, HLD, Breast CA in remission, s/p I&D and debridement of labial abscess extending to anterior mons, POD #4 on IV antibiotics, leukocytosis, but afebrile, no signs of sepsis at this time, recovering well, s/p dressing PM dressing change    - continue tigecycline 50mg q12  -f/u PM labs  - continue plavix per cardio, eliquis discontinued, s/p heparin x 2 doses today  -strict I/O  -monitor for signs of sepsis  - OR tomorrow for debridement and possible closure by burn  -NPO after midnight  -DVT/GI prophylaxis    Will make attending on call aware

## 2019-03-24 NOTE — PROGRESS NOTE ADULT - ATTENDING COMMENTS
wound appears healthy, continue IV antibiotics per ID, WBC decreasing, afebrile. culture gram neg rods prelim. awaiting sx path. PT to ambulate pt, SCDs while in bed, incentive spirometry. Pt to OR tomorrow with burn team for debridement and possible closure

## 2019-03-24 NOTE — PROGRESS NOTE ADULT - SUBJECTIVE AND OBJECTIVE BOX
PGY 3 note    Patient seen at bedside for dressing change. Denies fever/chills, nausea/vomiting, diarrhea/constipation. Tolerating PO. Ambulating.     VS  ICU Vital Signs Last 24 Hrs  T(C): 35.6 (24 Mar 2019 12:37), Max: 36.3 (24 Mar 2019 06:36)  T(F): 96.1 (24 Mar 2019 12:37), Max: 97.4 (24 Mar 2019 06:36)  HR: 66 (24 Mar 2019 12:37) (66 - 87)  BP: 129/59 (24 Mar 2019 12:37) (129/59 - 160/67)  BP(mean): --  ABP: --  ABP(mean): --  RR: 18 (24 Mar 2019 12:37) (18 - 18)  SpO2: --    GEN: NAD, AAO x3  Heart:RRR  Lungs: CTAB  Abd: soft, NT, ND, no r/g/r, +BS    03-23-19 @ 07:01  -  03-24-19 @ 07:00  --------------------------------------------------------  IN: 420 mL / OUT: 1975 mL / NET: -1555 mL    03-24-19 @ 07:01  -  03-24-19 @ 22:57  --------------------------------------------------------  IN: 0 mL / OUT: 650 mL / NET: -650 mL      Ext genitalia:  Packing in right labial incision lightly stained with serosanguinous drainage, removed in its entirety. Wound probed and inspected, wound with healthy granulation tissue, well vascularized, no purulent drainage or loculations, 10cm in length, 4cm deep, 5cm superiorly to mons, 4cm inferiorly. Labial wound then copiously irrigated with sterile water and repacked with moist kerlex, secured with dry gauze and abdominal pad.     Ext: no calf tenderness or edema b/l      LABS:                        10.7   15.85 )-----------( 347      ( 24 Mar 2019 08:37 )             33.2                         10.6   14.71 )-----------( 307      ( 23 Mar 2019 05:59 )             32.4     Magnesium, Serum: 1.9 mg/dL (03-24 @ 08:37)    03-24-19 @ 08:37      138  |  102  |  33<H>  ----------------------------<  96  4.8   |  28  |  0.8    03-23-19 @ 05:59      140  |  103  |  37<H>  ----------------------------<  97  4.2   |  24  |  0.8    03-22-19 @ 08:34      138  |  102  |  32<H>  ----------------------------<  113<H>  3.8   |  27  |  0.8        Ca    8.5      24 Mar 2019 08:37  Ca    8.2<L>      23 Mar 2019 05:59  Ca    8.1<L>      22 Mar 2019 08:34  Phos  3.7     03-23  Phos  3.3     03-22  Mg     1.9     03-24  Mg     1.8     03-23  Mg     1.8     03-22    TPro  4.3<L>  /  Alb  2.3<L>  /  TBili  0.4  /  DBili  x   /  AST  37  /  ALT  22  /  AlkPhos  80  03-22-19 @ 08:34      Culture - Surgical Swab (collected 03-20-19 @ 17:11)  Source: .Surgical Swab rt. vulvar abscess  Final Report (03-24-19 @ 18:26):    Few Mixed Anaerobic Tessie "Susceptibilities not performed"      medications  ATENolol  Tablet   25 milliGRAM(s) Oral (03-24-19 @ 05:25)    atorvastatin   40 milliGRAM(s) Oral (03-24-19 @ 22:00)    clopidogrel Tablet   75 milliGRAM(s) Oral (03-24-19 @ 12:46)    docusate sodium   100 milliGRAM(s) Oral (03-24-19 @ 05:26)   100 milliGRAM(s) Oral (03-24-19 @ 18:29)    escitalopram   10 milliGRAM(s) Oral (03-24-19 @ 12:46)    heparin  Injectable   5000 Unit(s) SubCutaneous (03-24-19 @ 05:26)   5000 Unit(s) SubCutaneous (03-24-19 @ 18:29)    losartan   50 milliGRAM(s) Oral (03-24-19 @ 05:25)    pantoprazole    Tablet   40 milliGRAM(s) Oral (03-24-19 @ 05:25)    senna   2 Tablet(s) Oral (03-24-19 @ 22:00)    sodium chloride 0.9% lock flush   3 milliLiter(s) IV Push (03-24-19 @ 05:26)   3 milliLiter(s) IV Push (03-24-19 @ 16:16)   3 milliLiter(s) IV Push (03-24-19 @ 21:56)    tigecycline IVPB   105 mL/Hr IV Intermittent (03-24-19 @ 05:24)   105 mL/Hr IV Intermittent (03-24-19 @ 18:30)

## 2019-03-24 NOTE — PROGRESS NOTE ADULT - SUBJECTIVE AND OBJECTIVE BOX
PGY4 Note:    Pt seen and evaluated at bedside. Denies perineal pain, n/v, fever/chills, chest pain, SOB. Has ambulated with assistance and with walker. Tolerating regular diet.     Vital Signs Last 24 Hrs  T(C): 36.3 (24 Mar 2019 06:36), Max: 37.1 (23 Mar 2019 12:45)  T(F): 97.4 (24 Mar 2019 06:36), Max: 98.7 (23 Mar 2019 12:45)  HR: 87 (24 Mar 2019 06:36) (77 - 87)  BP: 160/67 (24 Mar 2019 06:36) (123/60 - 160/67)  RR: 18 (24 Mar 2019 06:36) (16 - 18)    Gen: NAD, AAOx3  Heart: s1s2, rrr  Lungs: ctab  Perineum: right labial wound - packing changed, moist kerlex packed, wound bed appears healthy, no purulent drainage, wound 10cm in length, 4cm deep, tracks upwards to mons pubis additional 5cm, 4cm inferiorly  Urine output (2185-9352): 800cc = 107cc/hr (min 36cc/hr)    Labs:                        10.7   15.85 )-----------( 347      ( 24 Mar 2019 08:37 )             33.2   03-24    138  |  102  |  33<H>  ----------------------------<  96  4.8   |  28  |  0.8    Ca    8.5      24 Mar 2019 08:37  Phos  3.7     03-23  Mg     1.9     03-24      .Surgical Swab rt. vulvar abscess  03-20-19   Few Gram Negative Rods  Rule Out Anaerobes  --  --      .Genital Vulva  03-18-19   Few Routine vaginal kinsey  --  --    MEDICATIONS  (STANDING):  ATENolol  Tablet 25 milliGRAM(s) Oral daily  atorvastatin 40 milliGRAM(s) Oral at bedtime  clopidogrel Tablet 75 milliGRAM(s) Oral daily  docusate sodium 100 milliGRAM(s) Oral two times a day  escitalopram 10 milliGRAM(s) Oral daily  heparin  Injectable 5000 Unit(s) SubCutaneous every 12 hours  losartan 50 milliGRAM(s) Oral daily  pantoprazole    Tablet 40 milliGRAM(s) Oral before breakfast  senna 2 Tablet(s) Oral at bedtime  sodium chloride 0.9% lock flush 3 milliLiter(s) IV Push every 8 hours  sodium chloride 0.9%. 1000 milliLiter(s) (75 mL/Hr) IV Continuous <Continuous>  tigecycline IVPB      tigecycline IVPB 50 milliGRAM(s) IV Intermittent every 12 hours    MEDICATIONS  (PRN):  acetaminophen   Tablet .. 650 milliGRAM(s) Oral every 6 hours PRN Mild Pain (1 - 3)  ibuprofen  Tablet. 600 milliGRAM(s) Oral every 8 hours PRN Moderate Pain (4 - 6)

## 2019-03-25 LAB
ANION GAP SERPL CALC-SCNC: 11 MMOL/L — SIGNIFICANT CHANGE UP (ref 7–14)
BASOPHILS # BLD AUTO: 0.09 K/UL — SIGNIFICANT CHANGE UP (ref 0–0.2)
BASOPHILS NFR BLD AUTO: 0.5 % — SIGNIFICANT CHANGE UP (ref 0–1)
BUN SERPL-MCNC: 31 MG/DL — HIGH (ref 10–20)
CALCIUM SERPL-MCNC: 8.2 MG/DL — LOW (ref 8.5–10.1)
CHLORIDE SERPL-SCNC: 103 MMOL/L — SIGNIFICANT CHANGE UP (ref 98–110)
CO2 SERPL-SCNC: 25 MMOL/L — SIGNIFICANT CHANGE UP (ref 17–32)
CREAT SERPL-MCNC: 0.7 MG/DL — SIGNIFICANT CHANGE UP (ref 0.7–1.5)
EOSINOPHIL # BLD AUTO: 0.49 K/UL — SIGNIFICANT CHANGE UP (ref 0–0.7)
EOSINOPHIL NFR BLD AUTO: 2.9 % — SIGNIFICANT CHANGE UP (ref 0–8)
GLUCOSE SERPL-MCNC: 93 MG/DL — SIGNIFICANT CHANGE UP (ref 70–99)
HCT VFR BLD CALC: 31.4 % — LOW (ref 37–47)
HGB BLD-MCNC: 10.2 G/DL — LOW (ref 12–16)
IMM GRANULOCYTES NFR BLD AUTO: 1.6 % — HIGH (ref 0.1–0.3)
LYMPHOCYTES # BLD AUTO: 1.9 K/UL — SIGNIFICANT CHANGE UP (ref 1.2–3.4)
LYMPHOCYTES # BLD AUTO: 11.1 % — LOW (ref 20.5–51.1)
MAGNESIUM SERPL-MCNC: 1.7 MG/DL — LOW (ref 1.8–2.4)
MCHC RBC-ENTMCNC: 30.5 PG — SIGNIFICANT CHANGE UP (ref 27–31)
MCHC RBC-ENTMCNC: 32.5 G/DL — SIGNIFICANT CHANGE UP (ref 32–37)
MCV RBC AUTO: 94 FL — SIGNIFICANT CHANGE UP (ref 81–99)
MONOCYTES # BLD AUTO: 1.43 K/UL — HIGH (ref 0.1–0.6)
MONOCYTES NFR BLD AUTO: 8.3 % — SIGNIFICANT CHANGE UP (ref 1.7–9.3)
NEUTROPHILS # BLD AUTO: 12.98 K/UL — HIGH (ref 1.4–6.5)
NEUTROPHILS NFR BLD AUTO: 75.6 % — HIGH (ref 42.2–75.2)
NRBC # BLD: 0 /100 WBCS — SIGNIFICANT CHANGE UP (ref 0–0)
PHOSPHATE SERPL-MCNC: 3.4 MG/DL — SIGNIFICANT CHANGE UP (ref 2.1–4.9)
PLATELET # BLD AUTO: 348 K/UL — SIGNIFICANT CHANGE UP (ref 130–400)
POTASSIUM SERPL-MCNC: 4.1 MMOL/L — SIGNIFICANT CHANGE UP (ref 3.5–5)
POTASSIUM SERPL-SCNC: 4.1 MMOL/L — SIGNIFICANT CHANGE UP (ref 3.5–5)
RBC # BLD: 3.34 M/UL — LOW (ref 4.2–5.4)
RBC # FLD: 14.8 % — HIGH (ref 11.5–14.5)
SODIUM SERPL-SCNC: 139 MMOL/L — SIGNIFICANT CHANGE UP (ref 135–146)
SURGICAL PATHOLOGY STUDY: SIGNIFICANT CHANGE UP
WBC # BLD: 17.16 K/UL — HIGH (ref 4.8–10.8)
WBC # FLD AUTO: 17.16 K/UL — HIGH (ref 4.8–10.8)

## 2019-03-25 RX ORDER — TIGECYCLINE 50 MG/5ML
50 INJECTION, POWDER, LYOPHILIZED, FOR SOLUTION INTRAVENOUS EVERY 12 HOURS
Qty: 0 | Refills: 0 | Status: DISCONTINUED | OUTPATIENT
Start: 2019-03-25 | End: 2019-03-27

## 2019-03-25 RX ORDER — ONDANSETRON 8 MG/1
4 TABLET, FILM COATED ORAL ONCE
Qty: 0 | Refills: 0 | Status: DISCONTINUED | OUTPATIENT
Start: 2019-03-25 | End: 2019-03-25

## 2019-03-25 RX ORDER — ACETAMINOPHEN 500 MG
650 TABLET ORAL EVERY 6 HOURS
Qty: 0 | Refills: 0 | Status: DISCONTINUED | OUTPATIENT
Start: 2019-03-25 | End: 2019-03-29

## 2019-03-25 RX ORDER — MEPERIDINE HYDROCHLORIDE 50 MG/ML
12.5 INJECTION INTRAMUSCULAR; INTRAVENOUS; SUBCUTANEOUS
Qty: 0 | Refills: 0 | Status: DISCONTINUED | OUTPATIENT
Start: 2019-03-25 | End: 2019-03-25

## 2019-03-25 RX ORDER — SODIUM CHLORIDE 9 MG/ML
1000 INJECTION INTRAMUSCULAR; INTRAVENOUS; SUBCUTANEOUS
Qty: 0 | Refills: 0 | Status: DISCONTINUED | OUTPATIENT
Start: 2019-03-25 | End: 2019-03-26

## 2019-03-25 RX ORDER — SENNA PLUS 8.6 MG/1
2 TABLET ORAL AT BEDTIME
Qty: 0 | Refills: 0 | Status: DISCONTINUED | OUTPATIENT
Start: 2019-03-25 | End: 2019-03-29

## 2019-03-25 RX ORDER — DOCUSATE SODIUM 100 MG
100 CAPSULE ORAL
Qty: 0 | Refills: 0 | Status: DISCONTINUED | OUTPATIENT
Start: 2019-03-25 | End: 2019-03-29

## 2019-03-25 RX ORDER — SODIUM CHLORIDE 9 MG/ML
1000 INJECTION, SOLUTION INTRAVENOUS
Qty: 0 | Refills: 0 | Status: DISCONTINUED | OUTPATIENT
Start: 2019-03-25 | End: 2019-03-25

## 2019-03-25 RX ORDER — CLOPIDOGREL BISULFATE 75 MG/1
75 TABLET, FILM COATED ORAL DAILY
Qty: 0 | Refills: 0 | Status: DISCONTINUED | OUTPATIENT
Start: 2019-03-25 | End: 2019-03-29

## 2019-03-25 RX ORDER — SODIUM CHLORIDE 9 MG/ML
3 INJECTION INTRAMUSCULAR; INTRAVENOUS; SUBCUTANEOUS EVERY 8 HOURS
Qty: 0 | Refills: 0 | Status: DISCONTINUED | OUTPATIENT
Start: 2019-03-25 | End: 2019-03-29

## 2019-03-25 RX ORDER — PANTOPRAZOLE SODIUM 20 MG/1
40 TABLET, DELAYED RELEASE ORAL
Qty: 0 | Refills: 0 | Status: DISCONTINUED | OUTPATIENT
Start: 2019-03-25 | End: 2019-03-29

## 2019-03-25 RX ORDER — LOSARTAN POTASSIUM 100 MG/1
50 TABLET, FILM COATED ORAL DAILY
Qty: 0 | Refills: 0 | Status: DISCONTINUED | OUTPATIENT
Start: 2019-03-25 | End: 2019-03-29

## 2019-03-25 RX ORDER — ATORVASTATIN CALCIUM 80 MG/1
40 TABLET, FILM COATED ORAL AT BEDTIME
Qty: 0 | Refills: 0 | Status: DISCONTINUED | OUTPATIENT
Start: 2019-03-25 | End: 2019-03-29

## 2019-03-25 RX ORDER — ESCITALOPRAM OXALATE 10 MG/1
10 TABLET, FILM COATED ORAL DAILY
Qty: 0 | Refills: 0 | Status: DISCONTINUED | OUTPATIENT
Start: 2019-03-25 | End: 2019-03-29

## 2019-03-25 RX ORDER — ATENOLOL 25 MG/1
25 TABLET ORAL DAILY
Qty: 0 | Refills: 0 | Status: DISCONTINUED | OUTPATIENT
Start: 2019-03-25 | End: 2019-03-29

## 2019-03-25 RX ORDER — IBUPROFEN 200 MG
600 TABLET ORAL EVERY 8 HOURS
Qty: 0 | Refills: 0 | Status: DISCONTINUED | OUTPATIENT
Start: 2019-03-25 | End: 2019-03-29

## 2019-03-25 RX ORDER — MORPHINE SULFATE 50 MG/1
2 CAPSULE, EXTENDED RELEASE ORAL
Qty: 0 | Refills: 0 | Status: DISCONTINUED | OUTPATIENT
Start: 2019-03-25 | End: 2019-03-25

## 2019-03-25 RX ORDER — APIXABAN 2.5 MG/1
2.5 TABLET, FILM COATED ORAL EVERY 12 HOURS
Qty: 0 | Refills: 0 | Status: DISCONTINUED | OUTPATIENT
Start: 2019-03-25 | End: 2019-03-29

## 2019-03-25 RX ORDER — MORPHINE SULFATE 50 MG/1
1 CAPSULE, EXTENDED RELEASE ORAL
Qty: 0 | Refills: 0 | Status: DISCONTINUED | OUTPATIENT
Start: 2019-03-25 | End: 2019-03-25

## 2019-03-25 RX ADMIN — SODIUM CHLORIDE 3 MILLILITER(S): 9 INJECTION INTRAMUSCULAR; INTRAVENOUS; SUBCUTANEOUS at 21:33

## 2019-03-25 RX ADMIN — SENNA PLUS 2 TABLET(S): 8.6 TABLET ORAL at 21:40

## 2019-03-25 RX ADMIN — Medication 100 MILLIGRAM(S): at 05:26

## 2019-03-25 RX ADMIN — TIGECYCLINE 105 MILLIGRAM(S): 50 INJECTION, POWDER, LYOPHILIZED, FOR SOLUTION INTRAVENOUS at 18:47

## 2019-03-25 RX ADMIN — TIGECYCLINE 105 MILLIGRAM(S): 50 INJECTION, POWDER, LYOPHILIZED, FOR SOLUTION INTRAVENOUS at 05:25

## 2019-03-25 RX ADMIN — SODIUM CHLORIDE 75 MILLILITER(S): 9 INJECTION INTRAMUSCULAR; INTRAVENOUS; SUBCUTANEOUS at 00:00

## 2019-03-25 RX ADMIN — LOSARTAN POTASSIUM 50 MILLIGRAM(S): 100 TABLET, FILM COATED ORAL at 21:40

## 2019-03-25 RX ADMIN — LOSARTAN POTASSIUM 50 MILLIGRAM(S): 100 TABLET, FILM COATED ORAL at 05:26

## 2019-03-25 RX ADMIN — APIXABAN 2.5 MILLIGRAM(S): 2.5 TABLET, FILM COATED ORAL at 18:49

## 2019-03-25 RX ADMIN — ATENOLOL 25 MILLIGRAM(S): 25 TABLET ORAL at 05:26

## 2019-03-25 RX ADMIN — SODIUM CHLORIDE 3 MILLILITER(S): 9 INJECTION INTRAMUSCULAR; INTRAVENOUS; SUBCUTANEOUS at 14:44

## 2019-03-25 RX ADMIN — CLOPIDOGREL BISULFATE 75 MILLIGRAM(S): 75 TABLET, FILM COATED ORAL at 15:23

## 2019-03-25 RX ADMIN — ATORVASTATIN CALCIUM 40 MILLIGRAM(S): 80 TABLET, FILM COATED ORAL at 21:41

## 2019-03-25 RX ADMIN — SODIUM CHLORIDE 100 MILLILITER(S): 9 INJECTION, SOLUTION INTRAVENOUS at 12:21

## 2019-03-25 RX ADMIN — SODIUM CHLORIDE 3 MILLILITER(S): 9 INJECTION INTRAMUSCULAR; INTRAVENOUS; SUBCUTANEOUS at 05:28

## 2019-03-25 RX ADMIN — PANTOPRAZOLE SODIUM 40 MILLIGRAM(S): 20 TABLET, DELAYED RELEASE ORAL at 21:40

## 2019-03-25 RX ADMIN — Medication 100 MILLIGRAM(S): at 18:13

## 2019-03-25 RX ADMIN — ATENOLOL 25 MILLIGRAM(S): 25 TABLET ORAL at 21:41

## 2019-03-25 RX ADMIN — PANTOPRAZOLE SODIUM 40 MILLIGRAM(S): 20 TABLET, DELAYED RELEASE ORAL at 05:26

## 2019-03-25 RX ADMIN — Medication 650 MILLIGRAM(S): at 18:12

## 2019-03-25 RX ADMIN — Medication 650 MILLIGRAM(S): at 18:42

## 2019-03-25 RX ADMIN — ESCITALOPRAM OXALATE 10 MILLIGRAM(S): 10 TABLET, FILM COATED ORAL at 15:23

## 2019-03-25 NOTE — PROGRESS NOTE ADULT - ASSESSMENT
89yo P2, h/o CAD with stent x3, a-fib, HTN, HLD, Breast CA in remission, s/p I&D and debridement of labial abscess extending to anterior mons on 3/20 now s/p second debridement and wound closure, POD 0, on IV antibiotics, leukocytosis, but afebrile, no signs of sepsis at this time, recovering well, urine outpt adequate    -dc IV fluids due to edema as per patient request(pt is tolerating clears and urine outpt is more than adequate)  - continue tigecycline 50mg q12  -f/u AM labs  -continue plavix and eliquis restarted  -strict I/O  -monitor for signs of sepsis  -advance diet as tolerated  -dressing change by BURN in AM    Will make the attending on call aware

## 2019-03-25 NOTE — BRIEF OPERATIVE NOTE - NSICDXBRIEFPOSTOP_GEN_ALL_CORE_FT
POST-OP DIAGNOSIS:  Open wound of genital labia 25-Mar-2019 12:27:50 extending to pubis  15 x 6 x 6 cm Long, Nataliian  Labial abscess 20-Mar-2019 16:32:51  Mckayla Morris
POST-OP DIAGNOSIS:  Labial abscess 20-Mar-2019 16:32:51  Mckayla Morris

## 2019-03-25 NOTE — BRIEF OPERATIVE NOTE - NSICDXBRIEFPREOP_GEN_ALL_CORE_FT
PRE-OP DIAGNOSIS:  Open wound of genital labia 25-Mar-2019 12:26:51 extending to pubis 15 x 6 x 6 Long, Malia  Labial abscess 20-Mar-2019 16:32:33  Mckayla Morris
PRE-OP DIAGNOSIS:  Labial abscess 20-Mar-2019 16:32:33  Mckayla Morris

## 2019-03-25 NOTE — PROGRESS NOTE ADULT - ASSESSMENT
IMPRESSION:  Infected R labial subcutaneous abscess.  Abscess cultures with mixed kinsey    RECOMMENDATIONS:  Tigecycline 50 mg iv q12h  For possible wound closure today

## 2019-03-25 NOTE — PROGRESS NOTE ADULT - SUBJECTIVE AND OBJECTIVE BOX
PGY 3 note    Patient seen and evaluated at bedside s/p debridement and secondary closure of labial wound. Denies pain, fever/chills, vomiting, nausea, diarrhea. Daniela in. Has not ambulated yet. Tolerating clears.    VS  ICU Vital Signs Last 24 Hrs  T(C): 36.9 (25 Mar 2019 22:00), Max: 36.9 (25 Mar 2019 05:53)  T(F): 98.5 (25 Mar 2019 22:00), Max: 98.5 (25 Mar 2019 05:53)  HR: 83 (25 Mar 2019 22:00) (72 - 83)  BP: 126/72 (25 Mar 2019 22:00) (126/72 - 156/65)  BP(mean): --  ABP: --  ABP(mean): --  RR: 18 (25 Mar 2019 22:00) (18 - 37)  SpO2: 97% (25 Mar 2019 13:51) (96% - 98%)    GEN: NAD, AAO x3  Heart: RRR  Lungs: CTAB  Abd: soft, NT, no r/g/r  SVE: wound packing clean and dry, 2 pollo drains connected to suction, no outpt.  Ext: no calf tenderness or edema b/l      03-24-19 @ 07:01  -  03-25-19 @ 07:00  --------------------------------------------------------  IN: 0 mL / OUT: 1650 mL / NET: -1650 mL    03-25-19 @ 07:01  -  03-26-19 @ 01:52  --------------------------------------------------------  IN: 200 mL / OUT: 1050 mL / NET: -850 mL        lab  Preop  3/25 17.16>10.2/31.4<348, 139/4.1/103/25/31/0.7<93, Mg 1.7, Phos 3.4      Medications  acetaminophen   Tablet ..   650 milliGRAM(s) Oral (03-25-19 @ 18:12)    apixaban   2.5 milliGRAM(s) Oral (03-25-19 @ 18:49)    ATENolol  Tablet   25 milliGRAM(s) Oral (03-25-19 @ 21:41)    ATENolol  Tablet   25 milliGRAM(s) Oral (03-25-19 @ 05:26)    atorvastatin   40 milliGRAM(s) Oral (03-25-19 @ 21:41)    clopidogrel Tablet   75 milliGRAM(s) Oral (03-25-19 @ 15:23)    docusate sodium   100 milliGRAM(s) Oral (03-25-19 @ 18:13)    docusate sodium   100 milliGRAM(s) Oral (03-25-19 @ 05:26)    escitalopram   10 milliGRAM(s) Oral (03-25-19 @ 15:23)    lactated ringers.   100 mL/Hr IV Continuous (03-25-19 @ 12:20)    losartan   50 milliGRAM(s) Oral (03-25-19 @ 05:26)    losartan   50 milliGRAM(s) Oral (03-25-19 @ 21:40)    pantoprazole    Tablet   40 milliGRAM(s) Oral (03-25-19 @ 05:26)    pantoprazole    Tablet   40 milliGRAM(s) Oral (03-25-19 @ 21:40)    senna   2 Tablet(s) Oral (03-25-19 @ 21:40)    sodium chloride 0.9% lock flush   3 milliLiter(s) IV Push (03-25-19 @ 14:44)   3 milliLiter(s) IV Push (03-25-19 @ 21:33)    sodium chloride 0.9% lock flush   3 milliLiter(s) IV Push (03-25-19 @ 05:28)    tigecycline IVPB   105 mL/Hr IV Intermittent (03-25-19 @ 18:47)    tigecycline IVPB   105 mL/Hr IV Intermittent (03-25-19 @ 05:25)    MEDICATIONS  (STANDING):  apixaban 2.5 milliGRAM(s) Oral every 12 hours  ATENolol  Tablet 25 milliGRAM(s) Oral daily  atorvastatin 40 milliGRAM(s) Oral at bedtime  clopidogrel Tablet 75 milliGRAM(s) Oral daily  docusate sodium 100 milliGRAM(s) Oral two times a day  escitalopram 10 milliGRAM(s) Oral daily  losartan 50 milliGRAM(s) Oral daily  pantoprazole    Tablet 40 milliGRAM(s) Oral before breakfast  senna 2 Tablet(s) Oral at bedtime  tigecycline IVPB 50 milliGRAM(s) IV Intermittent every 12 hours    MEDICATIONS  (PRN):  acetaminophen   Tablet .. 650 milliGRAM(s) Oral every 6 hours PRN Mild Pain (1 - 3)  ibuprofen  Tablet. 600 milliGRAM(s) Oral every 8 hours PRN Moderate Pain (4 - 6)

## 2019-03-25 NOTE — PROGRESS NOTE ADULT - SUBJECTIVE AND OBJECTIVE BOX
PGY4 Note:    Pt seen and evaluated at bedside. Denies perineal pain, n/v, fever/chills, chest pain, SOB. Has ambulated with assistance and with walker. Has been NPO since midnight    Vital Signs Last 24 Hrs  T(C): 36.9 (25 Mar 2019 05:53), Max: 36.9 (25 Mar 2019 05:53)  T(F): 98.5 (25 Mar 2019 05:53), Max: 98.5 (25 Mar 2019 05:53)  HR: 72 (25 Mar 2019 05:53) (66 - 87)  BP: 156/65 (25 Mar 2019 05:53) (129/59 - 160/67)  RR: 18 (25 Mar 2019 05:53) (18 - 18)    Gen: NAD, AAOx3  Heart: s1s2, rrr  Lungs: ctab  Perineum: right labial wound - currently packed with moist kerlex, covered with gauze and ABD - c/d/i  Urine output (7039-1152): 550cc = 73cc/hr  (min 36cc/hr)    Labs:                        10.7   15.85 )-----------( 347      ( 24 Mar 2019 08:37 )             33.2   03-24    138  |  102  |  33<H>  ----------------------------<  96  4.8   |  28  |  0.8    Ca    8.5      24 Mar 2019 08:37  Phos  3.7     03-23  Mg     1.9     03-24    O pos, neg antibody screen    .Surgical Swab rt. vulvar abscess  03-20-19   Few Mixed Anaerobic Kinsey "Susceptibilities not performed"  --  --      .Genital Vulva  03-18-19   Few Routine vaginal kinsey  --  --    MEDICATIONS  (STANDING):  ATENolol  Tablet 25 milliGRAM(s) Oral daily  atorvastatin 40 milliGRAM(s) Oral at bedtime  clopidogrel Tablet 75 milliGRAM(s) Oral daily  docusate sodium 100 milliGRAM(s) Oral two times a day  escitalopram 10 milliGRAM(s) Oral daily  losartan 50 milliGRAM(s) Oral daily  pantoprazole    Tablet 40 milliGRAM(s) Oral before breakfast  senna 2 Tablet(s) Oral at bedtime  sodium chloride 0.9% lock flush 3 milliLiter(s) IV Push every 8 hours  sodium chloride 0.9%. 1000 milliLiter(s) (75 mL/Hr) IV Continuous <Continuous>  tigecycline IVPB      tigecycline IVPB 50 milliGRAM(s) IV Intermittent every 12 hours    MEDICATIONS  (PRN):  acetaminophen   Tablet .. 650 milliGRAM(s) Oral every 6 hours PRN Mild Pain (1 - 3)  ibuprofen  Tablet. 600 milliGRAM(s) Oral every 8 hours PRN Moderate Pain (4 - 6)

## 2019-03-25 NOTE — CHART NOTE - NSCHARTNOTEFT_GEN_A_CORE
PACU ANESTHESIA ADMISSION NOTE      Procedure: Wound debridement  Incision and drainage, vulva    Post op diagnosis:  Labial abscess      ____  Intubated  TV:______       Rate: ______      FiO2: ______    ____  Patent Airway    ____  Full return of protective reflexes    ___x_  Full recovery from anesthesia / back to baseline     Vitals:   T: 98          R: 10                 BP:132/64                  Sat:  96                 P: 62      Mental Status:  _x___ Awake   __x___ Alert   _____ Drowsy   _____ Sedated    Nausea/Vomiting: x ____ NO  ______Yes,   See Post - Op Orders          Pain Scale (0-10):  __0___    Treatment: ____ None    __x__ See Post - Op/PCA Orders    Post - Operative Fluids:   ____ Oral   __x__ See Post - Op Orders    Plan: Discharge:   ____Home       x_____Floor     _____Critical Care    _____  Other:__alert and awake no complications_______________    Comments:

## 2019-03-25 NOTE — PROGRESS NOTE ADULT - ASSESSMENT
89yo with h/o multiple comorbidities, s/p I&D, sharp debridement of right labial abscess, EBL 150cc, POD5, wound currently open and packed with kerlex, on IV antibiotics, for secondary closure today with Burn Team.  -continue IV Tigecycline per ID  -plan for secondary closure today with Burn then transfer to Burn Team   -NPO from midnight with IV fluids  -AM labs with coags   -Pain management PRN  -Suarez to remain in place    Dr. Delaney to be made aware.

## 2019-03-25 NOTE — BRIEF OPERATIVE NOTE - NSICDXBRIEFPROCEDURE_GEN_ALL_CORE_FT
PROCEDURES:  Secondary closure of wound 25-Mar-2019 12:31:06 layered closure 15 x 6 x 6 cm Long, Malia  Wound debridement 20-Mar-2019 16:32:18 and  irrigation 15 x 6 x 6 cm Mckayla Morris PROCEDURES:  Secondary closure of wound 25-Mar-2019 12:31:06 layered closure 15 x 6 x 6 cm Long, Malia  Wound debridement 20-Mar-2019 16:32:18 Sharp excisional debridement and irrigation 15 x 6 x 6 cm Mckayla Morris

## 2019-03-25 NOTE — PROGRESS NOTE ADULT - ATTENDING COMMENTS
will make ID aware of slowly increasing WBC. Pt remains afebrile. To OR today by burn team for further debridement.

## 2019-03-26 LAB
ANION GAP SERPL CALC-SCNC: 12 MMOL/L — SIGNIFICANT CHANGE UP (ref 7–14)
BASOPHILS # BLD AUTO: 0.09 K/UL — SIGNIFICANT CHANGE UP (ref 0–0.2)
BASOPHILS NFR BLD AUTO: 0.4 % — SIGNIFICANT CHANGE UP (ref 0–1)
BUN SERPL-MCNC: 34 MG/DL — HIGH (ref 10–20)
CALCIUM SERPL-MCNC: 8.1 MG/DL — LOW (ref 8.5–10.1)
CHLORIDE SERPL-SCNC: 103 MMOL/L — SIGNIFICANT CHANGE UP (ref 98–110)
CO2 SERPL-SCNC: 24 MMOL/L — SIGNIFICANT CHANGE UP (ref 17–32)
CREAT SERPL-MCNC: 0.8 MG/DL — SIGNIFICANT CHANGE UP (ref 0.7–1.5)
EOSINOPHIL # BLD AUTO: 0.47 K/UL — SIGNIFICANT CHANGE UP (ref 0–0.7)
EOSINOPHIL NFR BLD AUTO: 2.3 % — SIGNIFICANT CHANGE UP (ref 0–8)
GLUCOSE SERPL-MCNC: 102 MG/DL — HIGH (ref 70–99)
HCT VFR BLD CALC: 32.3 % — LOW (ref 37–47)
HGB BLD-MCNC: 10.5 G/DL — LOW (ref 12–16)
IMM GRANULOCYTES NFR BLD AUTO: 1.1 % — HIGH (ref 0.1–0.3)
LYMPHOCYTES # BLD AUTO: 1.71 K/UL — SIGNIFICANT CHANGE UP (ref 1.2–3.4)
LYMPHOCYTES # BLD AUTO: 8.5 % — LOW (ref 20.5–51.1)
MAGNESIUM SERPL-MCNC: 1.7 MG/DL — LOW (ref 1.8–2.4)
MCHC RBC-ENTMCNC: 30.9 PG — SIGNIFICANT CHANGE UP (ref 27–31)
MCHC RBC-ENTMCNC: 32.5 G/DL — SIGNIFICANT CHANGE UP (ref 32–37)
MCV RBC AUTO: 95 FL — SIGNIFICANT CHANGE UP (ref 81–99)
MONOCYTES # BLD AUTO: 1.6 K/UL — HIGH (ref 0.1–0.6)
MONOCYTES NFR BLD AUTO: 8 % — SIGNIFICANT CHANGE UP (ref 1.7–9.3)
NEUTROPHILS # BLD AUTO: 16 K/UL — HIGH (ref 1.4–6.5)
NEUTROPHILS NFR BLD AUTO: 79.7 % — HIGH (ref 42.2–75.2)
NRBC # BLD: 0 /100 WBCS — SIGNIFICANT CHANGE UP (ref 0–0)
PHOSPHATE SERPL-MCNC: 3.7 MG/DL — SIGNIFICANT CHANGE UP (ref 2.1–4.9)
PLATELET # BLD AUTO: 364 K/UL — SIGNIFICANT CHANGE UP (ref 130–400)
POTASSIUM SERPL-MCNC: 4.6 MMOL/L — SIGNIFICANT CHANGE UP (ref 3.5–5)
POTASSIUM SERPL-SCNC: 4.6 MMOL/L — SIGNIFICANT CHANGE UP (ref 3.5–5)
RBC # BLD: 3.4 M/UL — LOW (ref 4.2–5.4)
RBC # FLD: 15.1 % — HIGH (ref 11.5–14.5)
SODIUM SERPL-SCNC: 139 MMOL/L — SIGNIFICANT CHANGE UP (ref 135–146)
WBC # BLD: 20.09 K/UL — HIGH (ref 4.8–10.8)
WBC # FLD AUTO: 20.09 K/UL — HIGH (ref 4.8–10.8)

## 2019-03-26 RX ORDER — MAGNESIUM OXIDE 400 MG ORAL TABLET 241.3 MG
400 TABLET ORAL ONCE
Qty: 0 | Refills: 0 | Status: COMPLETED | OUTPATIENT
Start: 2019-03-26 | End: 2019-03-26

## 2019-03-26 RX ORDER — CHLORHEXIDINE GLUCONATE 213 G/1000ML
1 SOLUTION TOPICAL
Qty: 0 | Refills: 0 | Status: DISCONTINUED | OUTPATIENT
Start: 2019-03-26 | End: 2019-03-26

## 2019-03-26 RX ORDER — CHLORHEXIDINE GLUCONATE 213 G/1000ML
1 SOLUTION TOPICAL
Qty: 0 | Refills: 0 | Status: DISCONTINUED | OUTPATIENT
Start: 2019-03-26 | End: 2019-03-29

## 2019-03-26 RX ADMIN — Medication 100 MILLIGRAM(S): at 17:21

## 2019-03-26 RX ADMIN — APIXABAN 2.5 MILLIGRAM(S): 2.5 TABLET, FILM COATED ORAL at 17:21

## 2019-03-26 RX ADMIN — SODIUM CHLORIDE 3 MILLILITER(S): 9 INJECTION INTRAMUSCULAR; INTRAVENOUS; SUBCUTANEOUS at 22:20

## 2019-03-26 RX ADMIN — SODIUM CHLORIDE 3 MILLILITER(S): 9 INJECTION INTRAMUSCULAR; INTRAVENOUS; SUBCUTANEOUS at 06:45

## 2019-03-26 RX ADMIN — ATENOLOL 25 MILLIGRAM(S): 25 TABLET ORAL at 05:37

## 2019-03-26 RX ADMIN — Medication 100 MILLIGRAM(S): at 05:36

## 2019-03-26 RX ADMIN — SODIUM CHLORIDE 3 MILLILITER(S): 9 INJECTION INTRAMUSCULAR; INTRAVENOUS; SUBCUTANEOUS at 15:39

## 2019-03-26 RX ADMIN — TIGECYCLINE 105 MILLIGRAM(S): 50 INJECTION, POWDER, LYOPHILIZED, FOR SOLUTION INTRAVENOUS at 05:33

## 2019-03-26 RX ADMIN — PANTOPRAZOLE SODIUM 40 MILLIGRAM(S): 20 TABLET, DELAYED RELEASE ORAL at 05:38

## 2019-03-26 RX ADMIN — MAGNESIUM OXIDE 400 MG ORAL TABLET 400 MILLIGRAM(S): 241.3 TABLET ORAL at 11:45

## 2019-03-26 RX ADMIN — SENNA PLUS 2 TABLET(S): 8.6 TABLET ORAL at 22:23

## 2019-03-26 RX ADMIN — ATORVASTATIN CALCIUM 40 MILLIGRAM(S): 80 TABLET, FILM COATED ORAL at 22:23

## 2019-03-26 RX ADMIN — APIXABAN 2.5 MILLIGRAM(S): 2.5 TABLET, FILM COATED ORAL at 05:36

## 2019-03-26 RX ADMIN — LOSARTAN POTASSIUM 50 MILLIGRAM(S): 100 TABLET, FILM COATED ORAL at 05:36

## 2019-03-26 RX ADMIN — ESCITALOPRAM OXALATE 10 MILLIGRAM(S): 10 TABLET, FILM COATED ORAL at 11:18

## 2019-03-26 RX ADMIN — CLOPIDOGREL BISULFATE 75 MILLIGRAM(S): 75 TABLET, FILM COATED ORAL at 11:19

## 2019-03-26 NOTE — DIETITIAN INITIAL EVALUATION ADULT. - DIET TYPE
Pt ate 75% breakfast, but had to take nearly 3 hours to eat them. Pt says she usually eats very well but now she is eating the food except takes a LONG time to eat. Agreed for supplements q24hr as snack./regular

## 2019-03-26 NOTE — PROGRESS NOTE ADULT - SUBJECTIVE AND OBJECTIVE BOX
BENITEZ FOX  89y, Female      OVERNIGHT EVENTS:    no fevers  S/p closure of wound 3/25    VITALS:  T(F): 97.9, Max: 98.5 (03-25-19 @ 22:00)  HR: 74  BP: 128/73  RR: 18Vital Signs Last 24 Hrs  T(C): 36.6 (26 Mar 2019 05:27), Max: 36.9 (25 Mar 2019 22:00)  T(F): 97.9 (26 Mar 2019 05:27), Max: 98.5 (25 Mar 2019 22:00)  HR: 74 (26 Mar 2019 05:27) (72 - 83)  BP: 128/73 (26 Mar 2019 05:27) (126/72 - 151/72)  BP(mean): --  RR: 18 (26 Mar 2019 05:27) (18 - 37)  SpO2: 97% (25 Mar 2019 13:51) (96% - 98%)    TESTS & MEASUREMENTS:                        10.2   17.16 )-----------( 348      ( 25 Mar 2019 08:19 )             31.4     03-25    139  |  103  |  31<H>  ----------------------------<  93  4.1   |  25  |  0.7    Ca    8.2<L>      25 Mar 2019 08:19  Phos  3.4     03-25  Mg     1.7     03-25          Culture - Surgical Swab (collected 03-20-19 @ 17:11)  Source: .Surgical Swab rt. vulvar abscess  Final Report (03-24-19 @ 18:26):    Few Mixed Anaerobic Tessie "Susceptibilities not performed"            RADIOLOGY & ADDITIONAL TESTS:    ANTIBIOTICS:  tigecycline IVPB 50 milliGRAM(s) IV Intermittent every 12 hours

## 2019-03-26 NOTE — PROGRESS NOTE ADULT - ASSESSMENT
89yo female, with hx of Afib and CAD with stentsx3 on eliquis and plavix, and hx of Breast CA in remission, now POD#6, s/p I+D and debridement of labial abscess extending to anterior mons on 3/20, now s/p debridement and wound closure #2 by burn team, POD#1, currently on IV tigecycline, complicated by leukocytosis.   -dc IV fluids due to edema as per patient request(pt is tolerating clears and urine outpt is more than adequate)  - continue tigecycline 50mg q12  -f/u AM labs  -continue plavix and eliquis  -Input and output, strict   -Vitals q4  -Diet as tolerated   -dressing change by Burn team today     Dr. Morris to be aware 89yo female, with hx of Afib and CAD with stentsx3 on eliquis and plavix, and hx of Breast CA in remission, now POD#6, s/p I+D and debridement of labial abscess extending to anterior mons on 3/20, now s/p debridement and wound closure #2 by burn team, POD#1, currently on IV tigecycline, complicated by leukocytosis.   -dc IV fluids due to edema as per patient request(pt is tolerating clears and urine outpt is more than adequate)  - continue tigecycline 50mg q12  -f/u AM labs  -continue plavix and eliquis  -Input and output, strict   -Vitals q4  -Diet as tolerated   -dressing change by Burn team today     Dr. Morris to be aware, Dr. Saleh to be aware

## 2019-03-26 NOTE — DIETITIAN INITIAL EVALUATION ADULT. - PT NOT SOURCE
other (specify)/LOS- pt is alert and oriented. 2+ L arm wrist and 3+ R arm and hand. LBM 3/25. Wound. No oral issue has all teeth.

## 2019-03-26 NOTE — PROGRESS NOTE ADULT - SUBJECTIVE AND OBJECTIVE BOX
BENITEZ ROXYISRAEL  89y  Female    PGY1 Note:    Patient seen and examined bedside, A+OX3. No overnight events. Reports pain well controlled. Ambulating without difficulty. Tolerating Liquids. Reports +Flatus, denies BM.    MEDICATIONS  (STANDING):  apixaban 2.5 milliGRAM(s) Oral every 12 hours  ATENolol  Tablet 25 milliGRAM(s) Oral daily  atorvastatin 40 milliGRAM(s) Oral at bedtime  clopidogrel Tablet 75 milliGRAM(s) Oral daily  docusate sodium 100 milliGRAM(s) Oral two times a day  escitalopram 10 milliGRAM(s) Oral daily  losartan 50 milliGRAM(s) Oral daily  pantoprazole    Tablet 40 milliGRAM(s) Oral before breakfast  senna 2 Tablet(s) Oral at bedtime  sodium chloride 0.9% lock flush 3 milliLiter(s) IV Push every 8 hours  sodium chloride 0.9%. 1000 milliLiter(s) (75 mL/Hr) IV Continuous <Continuous>  tigecycline IVPB 50 milliGRAM(s) IV Intermittent every 12 hours    MEDICATIONS  (PRN):  acetaminophen   Tablet .. 650 milliGRAM(s) Oral every 6 hours PRN Mild Pain (1 - 3)  ibuprofen  Tablet. 600 milliGRAM(s) Oral every 8 hours PRN Moderate Pain (4 - 6)      PAST MEDICAL & SURGICAL HISTORY:  Atrial fibrillation  Stented coronary artery  Cataract  Breast cancer  S/P lumpectomy, right breast  H/O total knee replacement, left      Physical Exam  Vital Signs Last 24 Hrs  T(C): 36.6 (26 Mar 2019 05:27), Max: 36.9 (25 Mar 2019 07:52)  T(F): 97.9 (26 Mar 2019 05:27), Max: 98.5 (25 Mar 2019 22:00)  HR: 74 (26 Mar 2019 05:27) (72 - 83)  BP: 128/73 (26 Mar 2019 05:27) (126/72 - 156/65)  RR: 18 (26 Mar 2019 05:27) (18 - 37)  SpO2: 97% (25 Mar 2019 13:51) (96% - 98%)    Gen: AAOx3, NAD  CV: RRR. No murmors gallops or rubs.  Pulm: CTAB. No wheezes or rales.  Ext: No calf tenderness, no swelling.   Abd: Soft, nontender, nondistended, BS+  Wound: Mild serosang drainage noted. Wound drain and bandage in place.       Labs:                          10.2   17.16 )-----------( 348      ( 25 Mar 2019 08:19 )             31.4                         10.7   15.85 )-----------( 347      ( 24 Mar 2019 08:37 )             33.2 BENITEZ ROXYISRAEL  89y  Female    PGY1 Note:    Patient seen and examined bedside, A+OX3. No overnight events. Reports pain well controlled. Ambulating without difficulty. Tolerating Liquids. Reports +Flatus, denies BM.    MEDICATIONS  (STANDING):  apixaban 2.5 milliGRAM(s) Oral every 12 hours  ATENolol  Tablet 25 milliGRAM(s) Oral daily  atorvastatin 40 milliGRAM(s) Oral at bedtime  clopidogrel Tablet 75 milliGRAM(s) Oral daily  docusate sodium 100 milliGRAM(s) Oral two times a day  escitalopram 10 milliGRAM(s) Oral daily  losartan 50 milliGRAM(s) Oral daily  pantoprazole    Tablet 40 milliGRAM(s) Oral before breakfast  senna 2 Tablet(s) Oral at bedtime  sodium chloride 0.9% lock flush 3 milliLiter(s) IV Push every 8 hours  sodium chloride 0.9%. 1000 milliLiter(s) (75 mL/Hr) IV Continuous <Continuous>  tigecycline IVPB 50 milliGRAM(s) IV Intermittent every 12 hours    MEDICATIONS  (PRN):  acetaminophen   Tablet .. 650 milliGRAM(s) Oral every 6 hours PRN Mild Pain (1 - 3)  ibuprofen  Tablet. 600 milliGRAM(s) Oral every 8 hours PRN Moderate Pain (4 - 6)      PAST MEDICAL & SURGICAL HISTORY:  Atrial fibrillation  Stented coronary artery  Cataract  Breast cancer  S/P lumpectomy, right breast  H/O total knee replacement, left      Physical Exam  Vital Signs Last 24 Hrs  T(C): 36.6 (26 Mar 2019 05:27), Max: 36.9 (25 Mar 2019 07:52)  T(F): 97.9 (26 Mar 2019 05:27), Max: 98.5 (25 Mar 2019 22:00)  HR: 74 (26 Mar 2019 05:27) (72 - 83)  BP: 128/73 (26 Mar 2019 05:27) (126/72 - 156/65)  RR: 18 (26 Mar 2019 05:27) (18 - 37)  SpO2: 97% (25 Mar 2019 13:51) (96% - 98%)    Gen: AAOx3, NAD  CV: RRR. No murmors gallops or rubs.  Pulm: CTAB. No wheezes or rales.  Ext: No calf tenderness, no swelling.   Abd: Soft, nontender, nondistended, BS+  Wound: Mild serosang drainage noted. Wound drainx2 and bandage in place.       Labs:                          10.2   17.16 )-----------( 348      ( 25 Mar 2019 08:19 )             31.4                         10.7   15.85 )-----------( 347      ( 24 Mar 2019 08:37 )             33.2

## 2019-03-26 NOTE — PROGRESS NOTE ADULT - ASSESSMENT
IMPRESSION:  Infected R labial subcutaneous abscess.  Abscess cultures with mixed kinsey  3/25 S/p wound closure    RECOMMENDATIONS:  Tigecycline 50 mg iv q12h

## 2019-03-26 NOTE — DIETITIAN INITIAL EVALUATION ADULT. - OTHER INFO
, w/ R labial pain and swelling since Monday. P/w GYN. ID consulted. day 6 s/p ID and debs of labial abscess 3/20. Wound closure by BURN yesterday. f/u labs. diet as tolerated.

## 2019-03-27 LAB
HCT VFR BLD CALC: 38.6 % — SIGNIFICANT CHANGE UP (ref 37–47)
HGB BLD-MCNC: 11.9 G/DL — LOW (ref 12–16)
MCHC RBC-ENTMCNC: 30.8 G/DL — LOW (ref 32–37)
MCHC RBC-ENTMCNC: 31.3 PG — HIGH (ref 27–31)
MCV RBC AUTO: 101.6 FL — HIGH (ref 81–99)
NRBC # BLD: 0 /100 WBCS — SIGNIFICANT CHANGE UP (ref 0–0)
PLATELET # BLD AUTO: 419 K/UL — HIGH (ref 130–400)
RBC # BLD: 3.8 M/UL — LOW (ref 4.2–5.4)
RBC # FLD: 15.6 % — HIGH (ref 11.5–14.5)
WBC # BLD: 19.53 K/UL — HIGH (ref 4.8–10.8)
WBC # FLD AUTO: 19.53 K/UL — HIGH (ref 4.8–10.8)

## 2019-03-27 RX ORDER — CEFPODOXIME PROXETIL 100 MG
100 TABLET ORAL EVERY 12 HOURS
Qty: 0 | Refills: 0 | Status: DISCONTINUED | OUTPATIENT
Start: 2019-03-27 | End: 2019-03-29

## 2019-03-27 RX ORDER — LINEZOLID 600 MG/300ML
600 INJECTION, SOLUTION INTRAVENOUS EVERY 12 HOURS
Qty: 0 | Refills: 0 | Status: DISCONTINUED | OUTPATIENT
Start: 2019-03-27 | End: 2019-03-27

## 2019-03-27 RX ADMIN — CHLORHEXIDINE GLUCONATE 1 APPLICATION(S): 213 SOLUTION TOPICAL at 06:11

## 2019-03-27 RX ADMIN — APIXABAN 2.5 MILLIGRAM(S): 2.5 TABLET, FILM COATED ORAL at 06:11

## 2019-03-27 RX ADMIN — SODIUM CHLORIDE 3 MILLILITER(S): 9 INJECTION INTRAMUSCULAR; INTRAVENOUS; SUBCUTANEOUS at 22:05

## 2019-03-27 RX ADMIN — ESCITALOPRAM OXALATE 10 MILLIGRAM(S): 10 TABLET, FILM COATED ORAL at 11:15

## 2019-03-27 RX ADMIN — TIGECYCLINE 105 MILLIGRAM(S): 50 INJECTION, POWDER, LYOPHILIZED, FOR SOLUTION INTRAVENOUS at 03:21

## 2019-03-27 RX ADMIN — SENNA PLUS 2 TABLET(S): 8.6 TABLET ORAL at 21:51

## 2019-03-27 RX ADMIN — ATENOLOL 25 MILLIGRAM(S): 25 TABLET ORAL at 06:11

## 2019-03-27 RX ADMIN — Medication 650 MILLIGRAM(S): at 03:45

## 2019-03-27 RX ADMIN — APIXABAN 2.5 MILLIGRAM(S): 2.5 TABLET, FILM COATED ORAL at 17:40

## 2019-03-27 RX ADMIN — Medication 650 MILLIGRAM(S): at 03:03

## 2019-03-27 RX ADMIN — CLOPIDOGREL BISULFATE 75 MILLIGRAM(S): 75 TABLET, FILM COATED ORAL at 11:15

## 2019-03-27 RX ADMIN — Medication 150 MILLIGRAM(S): at 21:51

## 2019-03-27 RX ADMIN — ATORVASTATIN CALCIUM 40 MILLIGRAM(S): 80 TABLET, FILM COATED ORAL at 21:51

## 2019-03-27 RX ADMIN — Medication 100 MILLIGRAM(S): at 06:11

## 2019-03-27 RX ADMIN — PANTOPRAZOLE SODIUM 40 MILLIGRAM(S): 20 TABLET, DELAYED RELEASE ORAL at 06:11

## 2019-03-27 RX ADMIN — Medication 650 MILLIGRAM(S): at 18:10

## 2019-03-27 RX ADMIN — CHLORHEXIDINE GLUCONATE 1 APPLICATION(S): 213 SOLUTION TOPICAL at 21:51

## 2019-03-27 RX ADMIN — Medication 650 MILLIGRAM(S): at 17:40

## 2019-03-27 RX ADMIN — LOSARTAN POTASSIUM 50 MILLIGRAM(S): 100 TABLET, FILM COATED ORAL at 06:11

## 2019-03-27 RX ADMIN — SODIUM CHLORIDE 3 MILLILITER(S): 9 INJECTION INTRAMUSCULAR; INTRAVENOUS; SUBCUTANEOUS at 06:10

## 2019-03-27 RX ADMIN — SODIUM CHLORIDE 3 MILLILITER(S): 9 INJECTION INTRAMUSCULAR; INTRAVENOUS; SUBCUTANEOUS at 13:40

## 2019-03-27 NOTE — PROCEDURE NOTE - NSPROCDETAILS_GEN_ALL_CORE
supine position/ultrasound guidance/sterile technique, catheter placed/ultrasound assessment/location identified, draped/prepped, sterile technique used/sterile dressing applied

## 2019-03-27 NOTE — PROGRESS NOTE ADULT - ATTENDING COMMENTS
89 with right labial abscess s/p I&D and debridement, and s/p wound closure by burn service, on IV antibiotics, improving. Wound visualized, staples in place. No dressing over incision and no drains in place at this moment. Area of right thigh which is contacting wound, with erythema as well as left labia majora and buttocks right underneath perineum erythematous as well, likely from irritation from contact and from stool. Otherwise area looks clean. Per pt, she was told to ambulate, she has been out of bed to chair, not wearing SCDs most of the time due to discomfort. Will put orders to ambulate with assistance, to reposition q2h to avoid pressure ulcers, adequate and often perineal hygiene and petrolleum jelly to erythematous area to avoid irritation and skin breaking. Per ID, change antibiotics to PO zyvox. Still awaiting for labs from today to trend WBCs. 89 with right labial abscess s/p I&D and debridement, and s/p wound closure by burn service, on IV antibiotics, improving. Wound visualized, staples in place. No dressing over incision at this moment. Area of right thigh which is contacting wound, with erythema as well as left labia majora and buttocks right underneath perineum erythematous as well, likely from irritation from contact and from stool. Otherwise area looks clean. Per pt, she was told to ambulate, she has been out of bed to chair, not wearing SCDs most of the time due to discomfort. Will put orders to ambulate with assistance, to reposition q2h to avoid pressure ulcers, adequate and often perineal hygiene and petrolleum jelly to erythematous area to avoid irritation and skin breaking. Per ID, change antibiotics to PO zyvox. Still awaiting for labs from today to trend WBCs.

## 2019-03-27 NOTE — PROGRESS NOTE ADULT - ASSESSMENT
· Assessment		  IMPRESSION:  Infected R labial subcutaneous abscess.  Abscess cultures with mixed kinsey  3/25 S/p wound closure  WBC 20    RECOMMENDATIONS:  Po Zyvox 600 mg q12h for 7 more days  D/c TGC · Assessment		  IMPRESSION:  Infected R labial subcutaneous abscess.  Abscess cultures with mixed kinsey  3/25 S/p wound closure  WBC 20    RECOMMENDATIONS:  Po Clindamycin 150 mg q8h and po Vantin 100 mg q12h ( I am aware pf pts allergies ) for 7 more days  D/c TGC

## 2019-03-27 NOTE — PROGRESS NOTE ADULT - SUBJECTIVE AND OBJECTIVE BOX
BENITEZ FOX  89y  Female    PGY1 Note:    Patient seen and examined bedside, A+OX3, s/p overnight midline placement for IV access. No overnight events, reports pain well controlled.  Currently not ambulating, moving legs in bed per physical therapy. Tolerating Diet. Reports +Flatus, voiding with stark, no complaints of dysuria.       MEDICATIONS  (STANDING):  apixaban 2.5 milliGRAM(s) Oral every 12 hours  ATENolol  Tablet 25 milliGRAM(s) Oral daily  atorvastatin 40 milliGRAM(s) Oral at bedtime  chlorhexidine 4% Liquid 1 Application(s) Topical two times a day  clopidogrel Tablet 75 milliGRAM(s) Oral daily  docusate sodium 100 milliGRAM(s) Oral two times a day  escitalopram 10 milliGRAM(s) Oral daily  losartan 50 milliGRAM(s) Oral daily  pantoprazole    Tablet 40 milliGRAM(s) Oral before breakfast  senna 2 Tablet(s) Oral at bedtime  sodium chloride 0.9% lock flush 3 milliLiter(s) IV Push every 8 hours  tigecycline IVPB 50 milliGRAM(s) IV Intermittent every 12 hours    MEDICATIONS  (PRN):  acetaminophen   Tablet .. 650 milliGRAM(s) Oral every 6 hours PRN Mild Pain (1 - 3)  ibuprofen  Tablet. 600 milliGRAM(s) Oral every 8 hours PRN Moderate Pain (4 - 6)      PAST MEDICAL & SURGICAL HISTORY:  Atrial fibrillation  Stented coronary artery  Cataract  Breast cancer  S/P lumpectomy, right breast  H/O total knee replacement, left      Physical Exam  Vital Signs Last 24 Hrs  T(C): 35.6 (27 Mar 2019 04:34), Max: 36.8 (26 Mar 2019 22:24)  T(F): 96 (27 Mar 2019 04:34), Max: 98.2 (26 Mar 2019 22:24)  HR: 59 (27 Mar 2019 04:34) (59 - 84)  BP: 115/55 (27 Mar 2019 04:34) (111/56 - 140/56)  RR: 18 (27 Mar 2019 04:34) (18 - 18)    Gen: AAOx3, NAD  CV: RRR. No murmors gallops or rubs.  Pulm: CTAB. No wheezes or rales.  Ext: No calf tenderness, no swelling.   Abd: Soft, nontender, nondistended, BS+  Wound: No drainage noted in pollo drains (2 present in left groin connected to intermittent suction). Dry gauze present on labial     Labs:                        10.5   20.09 )-----------( 364      ( 26 Mar 2019 08:51 )             32.3                         10.2   17.16 )-----------( 348      ( 25 Mar 2019 08:19 )             31.4

## 2019-03-27 NOTE — PROGRESS NOTE ADULT - SUBJECTIVE AND OBJECTIVE BOX
Called from pharmacy due to vantin order and possible reaction due to penicillin allergy. Spoke w/ pt, reported she had a skin rash 65 years ago, no anaphylactic reaction at this time. Due to non-anaphylactic nature of allergy, pt will be started on vantin.    Dr. Schuler aware and Dr Delaney to be made aware.

## 2019-03-27 NOTE — PROGRESS NOTE ADULT - ASSESSMENT
89yo female, with hx of Afib and CAD with stentsx3 on eliquis and plavix, and hx of Breast CA in remission, now POD#7, s/p I+D and debridement of labial abscess extending to anterior mons on 3/20, now s/p debridement and wound closure by burn team, POD#2, currently on IV tigecycline, complicated by leukocytosis, s/p midline placement  -f/u ID Recs for Oral Antibiotic recommendations for outpatient therapy   -continue tigecycline 50mg q12 inpatient  -f/u AM labs  -continue plavix and eliquis, continue home meds   -Input and output, strict   -Vitals q4  -Diet as tolerated   -dressing changes q2days  -f/u PT oli syed/katya stark when ambulating     Dr. Arnold to be aware, Dr. Delaney to be aware

## 2019-03-27 NOTE — PROGRESS NOTE ADULT - SUBJECTIVE AND OBJECTIVE BOX
BENITEZ FOX  89y, Female      OVERNIGHT EVENTS:    no fevers  no new complaints    VITALS:  T(F): 96, Max: 98.2 (03-26-19 @ 22:24)  HR: 59  BP: 115/55  RR: 18Vital Signs Last 24 Hrs  T(C): 35.6 (27 Mar 2019 04:34), Max: 36.8 (26 Mar 2019 22:24)  T(F): 96 (27 Mar 2019 04:34), Max: 98.2 (26 Mar 2019 22:24)  HR: 59 (27 Mar 2019 04:34) (59 - 84)  BP: 115/55 (27 Mar 2019 04:34) (111/56 - 140/56)  BP(mean): --  RR: 18 (27 Mar 2019 04:34) (18 - 18)  SpO2: --    TESTS & MEASUREMENTS:                        10.5   20.09 )-----------( 364      ( 26 Mar 2019 08:51 )             32.3     03-26    139  |  103  |  34<H>  ----------------------------<  102<H>  4.6   |  24  |  0.8    Ca    8.1<L>      26 Mar 2019 08:51  Phos  3.7     03-26  Mg     1.7     03-26          Culture - Surgical Swab (collected 03-20-19 @ 17:11)  Source: .Surgical Swab rt. vulvar abscess  Final Report (03-24-19 @ 18:26):    Few Mixed Anaerobic Tessie "Susceptibilities not performed"            RADIOLOGY & ADDITIONAL TESTS:    ANTIBIOTICS:  tigecycline IVPB 50 milliGRAM(s) IV Intermittent every 12 hours

## 2019-03-27 NOTE — PHARMACOTHERAPY INTERVENTION NOTE - COMMENTS
Doctor Morris started patient on apixaban, however, forgot to discontinue heparin for prophylaxis. Heparin was discontinued as per doctor Morris's permission.
recommended md cruz to d/c  heparin sq ,pt on eliquis
s/w dr lau regarding drug interaction between zyvox and lexapro. md/to d/c zyvox
left a note for a medical rounding team to consider checking the trough of vancomycin

## 2019-03-28 ENCOUNTER — TRANSCRIPTION ENCOUNTER (OUTPATIENT)
Age: 84
End: 2019-03-28

## 2019-03-28 LAB
ALBUMIN SERPL ELPH-MCNC: 3 G/DL — LOW (ref 3.5–5.2)
ALP SERPL-CCNC: 127 U/L — HIGH (ref 30–115)
ALT FLD-CCNC: 32 U/L — SIGNIFICANT CHANGE UP (ref 0–41)
ANION GAP SERPL CALC-SCNC: 16 MMOL/L — HIGH (ref 7–14)
AST SERPL-CCNC: 30 U/L — SIGNIFICANT CHANGE UP (ref 0–41)
BASOPHILS # BLD AUTO: 0.08 K/UL — SIGNIFICANT CHANGE UP (ref 0–0.2)
BASOPHILS NFR BLD AUTO: 0.6 % — SIGNIFICANT CHANGE UP (ref 0–1)
BILIRUB SERPL-MCNC: 0.4 MG/DL — SIGNIFICANT CHANGE UP (ref 0.2–1.2)
BUN SERPL-MCNC: 25 MG/DL — HIGH (ref 10–20)
CALCIUM SERPL-MCNC: 8.6 MG/DL — SIGNIFICANT CHANGE UP (ref 8.5–10.1)
CHLORIDE SERPL-SCNC: 103 MMOL/L — SIGNIFICANT CHANGE UP (ref 98–110)
CO2 SERPL-SCNC: 21 MMOL/L — SIGNIFICANT CHANGE UP (ref 17–32)
CREAT SERPL-MCNC: 1 MG/DL — SIGNIFICANT CHANGE UP (ref 0.7–1.5)
EOSINOPHIL # BLD AUTO: 0.38 K/UL — SIGNIFICANT CHANGE UP (ref 0–0.7)
EOSINOPHIL NFR BLD AUTO: 2.7 % — SIGNIFICANT CHANGE UP (ref 0–8)
GLUCOSE SERPL-MCNC: 99 MG/DL — SIGNIFICANT CHANGE UP (ref 70–99)
HCT VFR BLD CALC: 39.1 % — SIGNIFICANT CHANGE UP (ref 37–47)
HGB BLD-MCNC: 12.4 G/DL — SIGNIFICANT CHANGE UP (ref 12–16)
IMM GRANULOCYTES NFR BLD AUTO: 0.7 % — HIGH (ref 0.1–0.3)
LYMPHOCYTES # BLD AUTO: 1.3 K/UL — SIGNIFICANT CHANGE UP (ref 1.2–3.4)
LYMPHOCYTES # BLD AUTO: 9.2 % — LOW (ref 20.5–51.1)
MAGNESIUM SERPL-MCNC: 1.7 MG/DL — LOW (ref 1.8–2.4)
MCHC RBC-ENTMCNC: 30.5 PG — SIGNIFICANT CHANGE UP (ref 27–31)
MCHC RBC-ENTMCNC: 31.7 G/DL — LOW (ref 32–37)
MCV RBC AUTO: 96.1 FL — SIGNIFICANT CHANGE UP (ref 81–99)
MONOCYTES # BLD AUTO: 1.37 K/UL — HIGH (ref 0.1–0.6)
MONOCYTES NFR BLD AUTO: 9.7 % — HIGH (ref 1.7–9.3)
NEUTROPHILS # BLD AUTO: 10.91 K/UL — HIGH (ref 1.4–6.5)
NEUTROPHILS NFR BLD AUTO: 77.1 % — HIGH (ref 42.2–75.2)
NRBC # BLD: 0 /100 WBCS — SIGNIFICANT CHANGE UP (ref 0–0)
PLATELET # BLD AUTO: 473 K/UL — HIGH (ref 130–400)
POTASSIUM SERPL-MCNC: 4.3 MMOL/L — SIGNIFICANT CHANGE UP (ref 3.5–5)
POTASSIUM SERPL-SCNC: 4.3 MMOL/L — SIGNIFICANT CHANGE UP (ref 3.5–5)
PROT SERPL-MCNC: 5.4 G/DL — LOW (ref 6–8)
RBC # BLD: 4.07 M/UL — LOW (ref 4.2–5.4)
RBC # FLD: 15.6 % — HIGH (ref 11.5–14.5)
SODIUM SERPL-SCNC: 140 MMOL/L — SIGNIFICANT CHANGE UP (ref 135–146)
WBC # BLD: 14.14 K/UL — HIGH (ref 4.8–10.8)
WBC # FLD AUTO: 14.14 K/UL — HIGH (ref 4.8–10.8)

## 2019-03-28 RX ORDER — NYSTATIN CREAM 100000 [USP'U]/G
1 CREAM TOPICAL
Qty: 0 | Refills: 0 | Status: DISCONTINUED | OUTPATIENT
Start: 2019-03-28 | End: 2019-03-29

## 2019-03-28 RX ORDER — MAGNESIUM SULFATE 500 MG/ML
2 VIAL (ML) INJECTION ONCE
Qty: 0 | Refills: 0 | Status: COMPLETED | OUTPATIENT
Start: 2019-03-28 | End: 2019-03-28

## 2019-03-28 RX ADMIN — ATORVASTATIN CALCIUM 40 MILLIGRAM(S): 80 TABLET, FILM COATED ORAL at 22:06

## 2019-03-28 RX ADMIN — ESCITALOPRAM OXALATE 10 MILLIGRAM(S): 10 TABLET, FILM COATED ORAL at 11:06

## 2019-03-28 RX ADMIN — SENNA PLUS 2 TABLET(S): 8.6 TABLET ORAL at 22:06

## 2019-03-28 RX ADMIN — APIXABAN 2.5 MILLIGRAM(S): 2.5 TABLET, FILM COATED ORAL at 17:24

## 2019-03-28 RX ADMIN — SODIUM CHLORIDE 3 MILLILITER(S): 9 INJECTION INTRAMUSCULAR; INTRAVENOUS; SUBCUTANEOUS at 05:39

## 2019-03-28 RX ADMIN — Medication 150 MILLIGRAM(S): at 05:34

## 2019-03-28 RX ADMIN — Medication 50 GRAM(S): at 19:33

## 2019-03-28 RX ADMIN — SODIUM CHLORIDE 3 MILLILITER(S): 9 INJECTION INTRAMUSCULAR; INTRAVENOUS; SUBCUTANEOUS at 13:21

## 2019-03-28 RX ADMIN — PANTOPRAZOLE SODIUM 40 MILLIGRAM(S): 20 TABLET, DELAYED RELEASE ORAL at 06:04

## 2019-03-28 RX ADMIN — Medication 100 MILLIGRAM(S): at 17:24

## 2019-03-28 RX ADMIN — CHLORHEXIDINE GLUCONATE 1 APPLICATION(S): 213 SOLUTION TOPICAL at 05:35

## 2019-03-28 RX ADMIN — NYSTATIN CREAM 1 APPLICATION(S): 100000 CREAM TOPICAL at 17:24

## 2019-03-28 RX ADMIN — CLOPIDOGREL BISULFATE 75 MILLIGRAM(S): 75 TABLET, FILM COATED ORAL at 11:06

## 2019-03-28 RX ADMIN — Medication 150 MILLIGRAM(S): at 13:20

## 2019-03-28 RX ADMIN — SODIUM CHLORIDE 3 MILLILITER(S): 9 INJECTION INTRAMUSCULAR; INTRAVENOUS; SUBCUTANEOUS at 21:00

## 2019-03-28 RX ADMIN — Medication 100 MILLIGRAM(S): at 05:35

## 2019-03-28 RX ADMIN — LOSARTAN POTASSIUM 50 MILLIGRAM(S): 100 TABLET, FILM COATED ORAL at 05:34

## 2019-03-28 RX ADMIN — CHLORHEXIDINE GLUCONATE 1 APPLICATION(S): 213 SOLUTION TOPICAL at 17:25

## 2019-03-28 RX ADMIN — ATENOLOL 25 MILLIGRAM(S): 25 TABLET ORAL at 05:34

## 2019-03-28 RX ADMIN — Medication 100 MILLIGRAM(S): at 05:34

## 2019-03-28 RX ADMIN — APIXABAN 2.5 MILLIGRAM(S): 2.5 TABLET, FILM COATED ORAL at 05:34

## 2019-03-28 RX ADMIN — Medication 150 MILLIGRAM(S): at 22:06

## 2019-03-28 NOTE — DISCHARGE NOTE PROVIDER - NSFOLLOWUPCLINICS_GEN_ALL_ED_FT
[facility]  Burn  [address], [address]  [place] [address]  Phone: [phone]  Fax:   Follow Up Time: 7-10 Days    [facility]  OB/GYN  [address] [place] [address]  Phone: [phone]  Fax:   Follow Up Time: Routine [facility]  Burn  [address], [address]  [place] [address]  Phone: [phone]  Fax:   Follow Up Time: 7-10 Days    [facility]  OB/GYN  [address] [place] [address]  Phone: [phone]  Fax:   Follow Up Time: 7-10 Days

## 2019-03-28 NOTE — PROGRESS NOTE ADULT - ASSESSMENT
S/P I & D closure -  labial abscess   Continue dry dressing to site   Daughter states discharge planned for tomorrow .  Rec: Outpt Burn Service f/u next week

## 2019-03-28 NOTE — PROGRESS NOTE ADULT - SUBJECTIVE AND OBJECTIVE BOX
BENITEZ FOX  89y, Female      OVERNIGHT EVENTS:    no fevers, feels well, no complaints, ambulating with a walker    VITALS:  T(F): 96, Max: 98 (03-27-19 @ 22:17)  HR: 60  BP: 120/64  RR: 19Vital Signs Last 24 Hrs  T(C): 35.6 (28 Mar 2019 05:00), Max: 36.7 (27 Mar 2019 22:17)  T(F): 96 (28 Mar 2019 05:00), Max: 98 (27 Mar 2019 22:17)  HR: 60 (28 Mar 2019 05:00) (60 - 80)  BP: 120/64 (28 Mar 2019 05:00) (102/55 - 133/70)  BP(mean): --  RR: 19 (28 Mar 2019 05:00) (18 - 19)  SpO2: 98% (28 Mar 2019 08:17) (98% - 98%)    TESTS & MEASUREMENTS:                        11.9   19.53 )-----------( 419      ( 27 Mar 2019 14:41 )             38.6                     RADIOLOGY & ADDITIONAL TESTS:    ANTIBIOTICS:  cefpodoxime 100 milliGRAM(s) Oral every 12 hours  clindamycin   Capsule 150 milliGRAM(s) Oral every 8 hours

## 2019-03-28 NOTE — DISCHARGE NOTE NURSING/CASE MANAGEMENT/SOCIAL WORK - NSDCDPATPORTLINK_GEN_ALL_CORE
You can access the [facility] Patient Portal, offered by [facility], by registering with the following website: [email or web address]

## 2019-03-28 NOTE — PROGRESS NOTE ADULT - SUBJECTIVE AND OBJECTIVE BOX
AM rounds   POD # 3  Pt: no complaints  No acute events o/n  Vital Signs Last 24 Hrs  T(C): 36.3 (28 Mar 2019 14:40), Max: 36.7 (27 Mar 2019 22:17)  T(F): 97.4 (28 Mar 2019 14:40), Max: 98 (27 Mar 2019 22:17)  HR: 87 (28 Mar 2019 14:40) (60 - 87)  BP: 107/58 (28 Mar 2019 14:40) (107/58 - 133/70)  RR: 18 (28 Mar 2019 14:40) (18 - 19)  SpO2: 100% (28 Mar 2019 11:30) (98% - 100%)        I&O's Summary    27 Mar 2019 07:01  -  28 Mar 2019 07:00  --------------------------------------------------------  IN: 240 mL / OUT: 600 mL / NET: -360 mL    28 Mar 2019 07:01  -  28 Mar 2019 18:21  --------------------------------------------------------  IN: 0 mL / OUT: 175 mL / NET: -175 mL      Mg     1.7     03-28                            12.4   14.14 )-----------( 473      ( 28 Mar 2019 15:19 )             39.1       EXAM:   OOB in chair   Right labial  closure - clean dry intact

## 2019-03-28 NOTE — PROGRESS NOTE ADULT - ASSESSMENT
IMPRESSION:  Infected R labial subcutaneous abscess.  Abscess cultures with mixed kinsey  3/25 S/p wound closure  WBC 19.5  Clinically significantly improved     RECOMMENDATIONS:  Po Clindamycin 150 mg q8h and po Vantin 100 mg q12h ( I am aware pf pts allergies ) for 7 more days  trend WBC as outpt  recall prn please

## 2019-03-28 NOTE — DISCHARGE NOTE PROVIDER - CARE PROVIDER_API CALL
Radha Nieto)  OBSGYN  Physicians  [address] [place] [address]  Phone: [phone]  Fax: [phone]  Follow Up Time:

## 2019-03-28 NOTE — PROGRESS NOTE ADULT - CARDIOVASCULAR
Regular rate & rhythm, normal S1, S2; no murmurs, gallops or rubs; no S3, S4
detailed exam

## 2019-03-28 NOTE — PROGRESS NOTE ADULT - SUBJECTIVE AND OBJECTIVE BOX
BENITEZ FOX  89y  Female    PGY1 Note:      Patient seen and examined bedside, A+OX3, s/p stark removal last night, No overnight events, reports pain well controlled.  Currently ambulating with walker and working with physical therapy. Tolerating Diet. Reports +Flatus, +bowel movement. Voided twice without stark, no complaints of dysuria.       MEDICATIONS  (STANDING):  apixaban 2.5 milliGRAM(s) Oral every 12 hours  ATENolol  Tablet 25 milliGRAM(s) Oral daily  atorvastatin 40 milliGRAM(s) Oral at bedtime  cefpodoxime 100 milliGRAM(s) Oral every 12 hours  chlorhexidine 4% Liquid 1 Application(s) Topical two times a day  clindamycin   Capsule 150 milliGRAM(s) Oral every 8 hours  clopidogrel Tablet 75 milliGRAM(s) Oral daily  docusate sodium 100 milliGRAM(s) Oral two times a day  escitalopram 10 milliGRAM(s) Oral daily  losartan 50 milliGRAM(s) Oral daily  pantoprazole    Tablet 40 milliGRAM(s) Oral before breakfast  senna 2 Tablet(s) Oral at bedtime  sodium chloride 0.9% lock flush 3 milliLiter(s) IV Push every 8 hours    MEDICATIONS  (PRN):  acetaminophen   Tablet .. 650 milliGRAM(s) Oral every 6 hours PRN Mild Pain (1 - 3)  ibuprofen  Tablet. 600 milliGRAM(s) Oral every 8 hours PRN Moderate Pain (4 - 6)      PAST MEDICAL & SURGICAL HISTORY:  Atrial fibrillation  Stented coronary artery  Cataract  Breast cancer  S/P lumpectomy, right breast  H/O total knee replacement, left      Physical Exam  Vital Signs Last 24 Hrs  T(C): 35.6 (28 Mar 2019 05:00), Max: 36.7 (27 Mar 2019 22:17)  T(F): 96 (28 Mar 2019 05:00), Max: 98 (27 Mar 2019 22:17)  HR: 60 (28 Mar 2019 05:00) (60 - 80)  BP: 120/64 (28 Mar 2019 05:00) (102/55 - 133/70)  RR: 19 (28 Mar 2019 05:00) (18 - 19)    Gen: AAOx3, NAD  CV: RRR. No murmors gallops or rubs.  Pulm: CTAB. No wheezes or rales.  Ext: No calf tenderness, no swelling.   Abd: Soft, nontender, nondistended, BS+  Wound: No drainage noted, s/p pollo drain removal last night. Dry gauze present on labial with staples visible.    Labs:  Urine output: voidx1, 200cc this morning (not documented)                         11.9   19.53 )-----------( 419      ( 27 Mar 2019 14:41 )             38.6                         10.5   20.09 )-----------( 364      ( 26 Mar 2019 08:51 )             32.3

## 2019-03-28 NOTE — PROGRESS NOTE ADULT - ASSESSMENT
89yo female, with hx of Afib and CAD with stentsx3 on eliquis and plavix, and hx of Breast CA in remission, now POD#8, s/p I+D and debridement of labial abscess extending to anterior mons on 3/20, now s/p debridement and wound closure by burn team, POD#3, now s/p IV abx, transitioned to oral antibiotics, tolerating oral Clinda and Vantin.   -Clindamycin 150 mg q8h and po Vantin 100 mg q12h ( I am aware pf pts allergies ) for 7 more days  -Continue to measure voids post stark  -f/u AM labs  -continue plavix and eliquis, continue home meds   -Input and output, strict   -Vitals q4  -Diet as tolerated   -dressing changes q2days  -f/u PT yahaira, D/C with home care and PT at home    Dr. Ahuja to be aware, Dr. Delaney to be aware

## 2019-03-28 NOTE — DISCHARGE NOTE PROVIDER - HOSPITAL COURSE
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  pt states that she developed labial abscess the week of March 11, and was drained on MArch 12 by her OBGYN. She was started on doxycycline, but Stopped taking med after 2.5 days 2/2 intolerance.  Since then noticed increased size of abscess and surrounding erythema. She presented to the ED with purulent drainage and erythema of entire right labia prompting visit to the ED. Reported 1 fever prior to admission, 100.7, N/V/D, headache, chest pain, dyspnea, urinary sxs.  no further complaints.    Physical exam in the ED showed a labial abscess, and an I+D was performed with debridement on 3/20 in the OR.    She was started on vanco 1g q12/aztreonam q12/flagyl q8, s/p Zyvox 600 mg iv q12h, Aztreonam 500 mg iv  q8h, Flagyl 500mg iv  q12h. ID was consulted, and IV Tigecycline was started.     On 3/25, she was taken to the OR again for a secondary wound closure (closed with chromic, then staples on skin, covered by xeroform) by Burn with 2 Ken drains in place.     On 3/26, the drains were discontinued, and the stark was taken out. On 3/27, Per ID Recs, Tigecycline was discontinued and PO vantin and ciprofloxacin started.             Per burn pt to f/u 1 week after discharge, staples to be removed 2 weeks after placed (3/25 placed), no need for dressing other than dry guauze BID.     -perineal hygiene, petroleum jelly on thighs and buttock PRN

## 2019-03-28 NOTE — DISCHARGE NOTE PROVIDER - NSDCCPTREATMENT_GEN_ALL_CORE_FT
PRINCIPAL PROCEDURE  Procedure: Wound debridement  Findings and Treatment: Sharp excisional debridement and irrigation 15 x 6 x 6 cm      SECONDARY PROCEDURE  Procedure: Secondary closure of wound  Findings and Treatment: layered closure 15 x 6 x 6 cm

## 2019-03-29 VITALS
HEART RATE: 70 BPM | DIASTOLIC BLOOD PRESSURE: 65 MMHG | SYSTOLIC BLOOD PRESSURE: 106 MMHG | TEMPERATURE: 98 F | RESPIRATION RATE: 18 BRPM

## 2019-03-29 RX ORDER — CEFPODOXIME PROXETIL 100 MG
1 TABLET ORAL
Qty: 10 | Refills: 0 | OUTPATIENT
Start: 2019-03-29 | End: 2019-04-02

## 2019-03-29 RX ORDER — NYSTATIN CREAM 100000 [USP'U]/G
1 CREAM TOPICAL
Qty: 1 | Refills: 0 | OUTPATIENT
Start: 2019-03-29 | End: 2019-04-11

## 2019-03-29 RX ORDER — DOCUSATE SODIUM 100 MG
1 CAPSULE ORAL
Qty: 60 | Refills: 0 | OUTPATIENT
Start: 2019-03-29 | End: 2019-04-27

## 2019-03-29 RX ORDER — LOSARTAN POTASSIUM 100 MG/1
1 TABLET, FILM COATED ORAL
Qty: 0 | Refills: 0 | COMMUNITY
Start: 2019-03-29

## 2019-03-29 RX ADMIN — SODIUM CHLORIDE 3 MILLILITER(S): 9 INJECTION INTRAMUSCULAR; INTRAVENOUS; SUBCUTANEOUS at 05:41

## 2019-03-29 RX ADMIN — ATENOLOL 25 MILLIGRAM(S): 25 TABLET ORAL at 05:36

## 2019-03-29 RX ADMIN — PANTOPRAZOLE SODIUM 40 MILLIGRAM(S): 20 TABLET, DELAYED RELEASE ORAL at 05:38

## 2019-03-29 RX ADMIN — LOSARTAN POTASSIUM 50 MILLIGRAM(S): 100 TABLET, FILM COATED ORAL at 05:36

## 2019-03-29 RX ADMIN — NYSTATIN CREAM 1 APPLICATION(S): 100000 CREAM TOPICAL at 05:40

## 2019-03-29 RX ADMIN — CHLORHEXIDINE GLUCONATE 1 APPLICATION(S): 213 SOLUTION TOPICAL at 05:38

## 2019-03-29 RX ADMIN — CLOPIDOGREL BISULFATE 75 MILLIGRAM(S): 75 TABLET, FILM COATED ORAL at 11:56

## 2019-03-29 RX ADMIN — Medication 100 MILLIGRAM(S): at 05:36

## 2019-03-29 RX ADMIN — Medication 150 MILLIGRAM(S): at 05:36

## 2019-03-29 RX ADMIN — Medication 150 MILLIGRAM(S): at 13:24

## 2019-03-29 RX ADMIN — SODIUM CHLORIDE 3 MILLILITER(S): 9 INJECTION INTRAMUSCULAR; INTRAVENOUS; SUBCUTANEOUS at 13:24

## 2019-03-29 RX ADMIN — APIXABAN 2.5 MILLIGRAM(S): 2.5 TABLET, FILM COATED ORAL at 05:36

## 2019-03-29 RX ADMIN — Medication 100 MILLIGRAM(S): at 05:38

## 2019-03-29 NOTE — PROGRESS NOTE ADULT - ASSESSMENT
89yo female, with hx of Afib and CAD with stentsx3 on eliquis and plavix, and hx of Breast CA in remission, now POD#9, s/p I+D and debridement of labial abscess extending to anterior mons on 3/20, now s/p debridement and wound closure by burn team, POD#4, now s/p IV abx, transitioned to oral antibiotics, tolerating oral Clinda and Vantin.   -Clindamycin 150 mg q8h and po Vantin 100 mg q12h, currently on day #2/7 days oral abx, send home with oral antibiotics   -Continue to measure voids   -f/u AM labs  -continue plavix and eliquis, continue home meds   -Input and output, strict   -Vitals q4  -Diet as tolerated   -dressing changes q2days  -D/C with home care and PT at home    Dr. Lomeli aware, Dr. Ahuja to be aware, Dr. Rahman to be aware

## 2019-03-29 NOTE — PROGRESS NOTE ADULT - PROVIDER SPECIALTY LIST ADULT
Burn
GYN
Hospitalist
Infectious Disease
SICU
GYN
Infectious Disease

## 2019-03-29 NOTE — PROGRESS NOTE ADULT - SUBJECTIVE AND OBJECTIVE BOX
BENITEZ FOX  89y  Female    PGY1 Note:    Patient seen and examined bedside, A+OX3, currently voiding without difficulty, with incontinent voids, 1 overnight. No overnight events, reports pain well controlled.  Currently ambulating with walker and working with physical therapy. Tolerating Diet. Reports +Flatus, +bowel movement. No current complaints of dysuria.     MEDICATIONS  (STANDING):  apixaban 2.5 milliGRAM(s) Oral every 12 hours  ATENolol  Tablet 25 milliGRAM(s) Oral daily  atorvastatin 40 milliGRAM(s) Oral at bedtime  cefpodoxime 100 milliGRAM(s) Oral every 12 hours  chlorhexidine 4% Liquid 1 Application(s) Topical two times a day  clindamycin   Capsule 150 milliGRAM(s) Oral every 8 hours  clopidogrel Tablet 75 milliGRAM(s) Oral daily  docusate sodium 100 milliGRAM(s) Oral two times a day  escitalopram 10 milliGRAM(s) Oral daily  losartan 50 milliGRAM(s) Oral daily  nystatin Cream 1 Application(s) Topical two times a day  pantoprazole    Tablet 40 milliGRAM(s) Oral before breakfast  senna 2 Tablet(s) Oral at bedtime  sodium chloride 0.9% lock flush 3 milliLiter(s) IV Push every 8 hours    MEDICATIONS  (PRN):  acetaminophen   Tablet .. 650 milliGRAM(s) Oral every 6 hours PRN Mild Pain (1 - 3)  ibuprofen  Tablet. 600 milliGRAM(s) Oral every 8 hours PRN Moderate Pain (4 - 6)      PAST MEDICAL & SURGICAL HISTORY:  Atrial fibrillation  Stented coronary artery  Cataract  Breast cancer  S/P lumpectomy, right breast  H/O total knee replacement, left      Physical Exam  Vital Signs Last 24 Hrs  T(C): 35.9 (29 Mar 2019 05:15), Max: 36.9 (28 Mar 2019 20:02)  T(F): 96.6 (29 Mar 2019 05:15), Max: 98.5 (28 Mar 2019 20:02)  HR: 64 (29 Mar 2019 05:15) (64 - 87)  BP: 105/59 (29 Mar 2019 05:15) (105/59 - 124/59)  RR: 18 (29 Mar 2019 05:15) (18 - 18)  SpO2: 100% (28 Mar 2019 11:30) (98% - 100%)    Gen: AAOx3, NAD  CV: RRR. No murmors gallops or rubs.  Pulm: CTAB. No wheezes or rales.  Ext: No calf tenderness, no swelling.   Abd: Soft, nontender, nondistended, BS+  Wound: No drainage noted, s/p pollo drain removal 2 days ago. Dry gauze present on labial wound, dry and intact, with staples visible.    Labs:                        12.4   14.14 )-----------( 473      ( 28 Mar 2019 15:19 )             39.1                         11.9   19.53 )-----------( 419      ( 27 Mar 2019 14:41 )             38.6

## 2019-04-01 PROBLEM — I48.91 UNSPECIFIED ATRIAL FIBRILLATION: Chronic | Status: ACTIVE | Noted: 2019-03-18

## 2019-04-01 PROBLEM — Z95.5 PRESENCE OF CORONARY ANGIOPLASTY IMPLANT AND GRAFT: Chronic | Status: ACTIVE | Noted: 2019-03-18

## 2019-04-01 PROBLEM — C50.919 MALIGNANT NEOPLASM OF UNSPECIFIED SITE OF UNSPECIFIED FEMALE BREAST: Chronic | Status: ACTIVE | Noted: 2019-03-18

## 2019-04-01 PROBLEM — Z00.00 ENCOUNTER FOR PREVENTIVE HEALTH EXAMINATION: Status: ACTIVE | Noted: 2019-04-01

## 2019-04-01 PROBLEM — H26.9 UNSPECIFIED CATARACT: Chronic | Status: ACTIVE | Noted: 2019-03-18

## 2019-04-03 NOTE — CHART NOTE - NSCHARTNOTEFT_GEN_A_CORE
DOCUMENTATION CLARIFICATION NOTE  *Ms Mendez underwent an excisional debridement  *Hypomagnesemia was noted, magnesium repleted IV as necessary  *Chronic atrial fibrillation, present on admission

## 2019-04-04 ENCOUNTER — APPOINTMENT (OUTPATIENT)
Dept: BURN CARE | Facility: CLINIC | Age: 84
End: 2019-04-04

## 2019-04-04 ENCOUNTER — OUTPATIENT (OUTPATIENT)
Dept: OUTPATIENT SERVICES | Facility: HOSPITAL | Age: 84
LOS: 1 days | Discharge: HOME | End: 2019-04-04

## 2019-04-04 DIAGNOSIS — Z96.652 PRESENCE OF LEFT ARTIFICIAL KNEE JOINT: Chronic | ICD-10-CM

## 2019-04-04 DIAGNOSIS — Z98.890 OTHER SPECIFIED POSTPROCEDURAL STATES: Chronic | ICD-10-CM

## 2019-04-04 RX ORDER — CLINDAMYCIN HYDROCHLORIDE 150 MG/1
150 CAPSULE ORAL EVERY 6 HOURS
Qty: 15 | Refills: 0 | Status: ACTIVE | COMMUNITY
Start: 2019-04-04 | End: 1900-01-01

## 2019-04-08 NOTE — HISTORY OF PRESENT ILLNESS
[Did you have an operation on your burn/wound injury?] : Did you have an operation on your burn/wound injury? Yes [Did this injury occur on the job?] : Did this injury occur on the job? No [de-identified] : labia abscess post debridement and closure [de-identified] : wound healing

## 2019-04-08 NOTE — PHYSICAL EXAM
[Healing] : healing [Infected?] : Infected: No [Size%: ______] : Size: [unfilled]% [3] : 3 out of 10 [Small] : small  [Abnormal] : abnormal [de-identified] : labia healed--> local wound care [] : no

## 2019-04-08 NOTE — REASON FOR VISIT
[Revisit] : revisit [Were you seen in the Emergency Room?] : seen in the emergency room [Were you admitted to the burn center at [facility]?] : not admitted to the burn center at [facility] [Family Member] : family member

## 2019-04-10 DIAGNOSIS — E87.1 HYPO-OSMOLALITY AND HYPONATREMIA: ICD-10-CM

## 2019-04-10 DIAGNOSIS — Z79.01 LONG TERM (CURRENT) USE OF ANTICOAGULANTS: ICD-10-CM

## 2019-04-10 DIAGNOSIS — N76.4 ABSCESS OF VULVA: ICD-10-CM

## 2019-04-10 DIAGNOSIS — I96 GANGRENE, NOT ELSEWHERE CLASSIFIED: ICD-10-CM

## 2019-04-10 DIAGNOSIS — Z91.14 PATIENT'S OTHER NONCOMPLIANCE WITH MEDICATION REGIMEN: ICD-10-CM

## 2019-04-10 DIAGNOSIS — N90.89 OTHER SPECIFIED NONINFLAMMATORY DISORDERS OF VULVA AND PERINEUM: ICD-10-CM

## 2019-04-10 DIAGNOSIS — E87.6 HYPOKALEMIA: ICD-10-CM

## 2019-04-10 DIAGNOSIS — Z88.0 ALLERGY STATUS TO PENICILLIN: ICD-10-CM

## 2019-04-10 DIAGNOSIS — N17.9 ACUTE KIDNEY FAILURE, UNSPECIFIED: ICD-10-CM

## 2019-04-10 DIAGNOSIS — L02.215 CUTANEOUS ABSCESS OF PERINEUM: ICD-10-CM

## 2019-04-10 DIAGNOSIS — Z92.3 PERSONAL HISTORY OF IRRADIATION: ICD-10-CM

## 2019-04-10 DIAGNOSIS — I10 ESSENTIAL (PRIMARY) HYPERTENSION: ICD-10-CM

## 2019-04-10 DIAGNOSIS — I48.2 CHRONIC ATRIAL FIBRILLATION: ICD-10-CM

## 2019-04-10 DIAGNOSIS — Z88.2 ALLERGY STATUS TO SULFONAMIDES: ICD-10-CM

## 2019-04-10 DIAGNOSIS — I25.10 ATHEROSCLEROTIC HEART DISEASE OF NATIVE CORONARY ARTERY WITHOUT ANGINA PECTORIS: ICD-10-CM

## 2019-04-10 DIAGNOSIS — Z85.3 PERSONAL HISTORY OF MALIGNANT NEOPLASM OF BREAST: ICD-10-CM

## 2019-04-10 DIAGNOSIS — Z96.652 PRESENCE OF LEFT ARTIFICIAL KNEE JOINT: ICD-10-CM

## 2019-04-10 DIAGNOSIS — E78.5 HYPERLIPIDEMIA, UNSPECIFIED: ICD-10-CM

## 2019-04-10 DIAGNOSIS — Z95.5 PRESENCE OF CORONARY ANGIOPLASTY IMPLANT AND GRAFT: ICD-10-CM

## 2019-04-27 ENCOUNTER — INPATIENT (INPATIENT)
Facility: HOSPITAL | Age: 84
LOS: 6 days | Discharge: HOME HEALTH | End: 2019-05-04
Attending: PLASTIC SURGERY | Admitting: PLASTIC SURGERY
Payer: MEDICARE

## 2019-04-27 VITALS
HEART RATE: 62 BPM | SYSTOLIC BLOOD PRESSURE: 122 MMHG | TEMPERATURE: 97 F | OXYGEN SATURATION: 96 % | DIASTOLIC BLOOD PRESSURE: 60 MMHG | RESPIRATION RATE: 16 BRPM

## 2019-04-27 DIAGNOSIS — Z96.652 PRESENCE OF LEFT ARTIFICIAL KNEE JOINT: Chronic | ICD-10-CM

## 2019-04-27 DIAGNOSIS — Z98.890 OTHER SPECIFIED POSTPROCEDURAL STATES: Chronic | ICD-10-CM

## 2019-04-27 LAB
ALBUMIN SERPL ELPH-MCNC: 4 G/DL — SIGNIFICANT CHANGE UP (ref 3.5–5.2)
ALP SERPL-CCNC: 75 U/L — SIGNIFICANT CHANGE UP (ref 30–115)
ALT FLD-CCNC: 14 U/L — SIGNIFICANT CHANGE UP (ref 0–41)
ANION GAP SERPL CALC-SCNC: 14 MMOL/L — SIGNIFICANT CHANGE UP (ref 7–14)
APTT BLD: 36.5 SEC — SIGNIFICANT CHANGE UP (ref 27–39.2)
AST SERPL-CCNC: 16 U/L — SIGNIFICANT CHANGE UP (ref 0–41)
BASE EXCESS BLDV CALC-SCNC: 5.2 MMOL/L — HIGH (ref -2–2)
BASOPHILS # BLD AUTO: 0.03 K/UL — SIGNIFICANT CHANGE UP (ref 0–0.2)
BASOPHILS NFR BLD AUTO: 0.2 % — SIGNIFICANT CHANGE UP (ref 0–1)
BILIRUB SERPL-MCNC: 0.4 MG/DL — SIGNIFICANT CHANGE UP (ref 0.2–1.2)
BLD GP AB SCN SERPL QL: SIGNIFICANT CHANGE UP
BUN SERPL-MCNC: 34 MG/DL — HIGH (ref 10–20)
CA-I SERPL-SCNC: 1.26 MMOL/L — SIGNIFICANT CHANGE UP (ref 1.12–1.3)
CALCIUM SERPL-MCNC: 10 MG/DL — SIGNIFICANT CHANGE UP (ref 8.5–10.1)
CHLORIDE SERPL-SCNC: 100 MMOL/L — SIGNIFICANT CHANGE UP (ref 98–110)
CO2 SERPL-SCNC: 27 MMOL/L — SIGNIFICANT CHANGE UP (ref 17–32)
CREAT SERPL-MCNC: 1.1 MG/DL — SIGNIFICANT CHANGE UP (ref 0.7–1.5)
EOSINOPHIL # BLD AUTO: 0.3 K/UL — SIGNIFICANT CHANGE UP (ref 0–0.7)
EOSINOPHIL NFR BLD AUTO: 2 % — SIGNIFICANT CHANGE UP (ref 0–8)
GAS PNL BLDV: 141 MMOL/L — SIGNIFICANT CHANGE UP (ref 136–145)
GAS PNL BLDV: SIGNIFICANT CHANGE UP
GAS PNL BLDV: SIGNIFICANT CHANGE UP
GLUCOSE SERPL-MCNC: 101 MG/DL — HIGH (ref 70–99)
HCO3 BLDV-SCNC: 31 MMOL/L — HIGH (ref 22–29)
HCT VFR BLD CALC: 37.3 % — SIGNIFICANT CHANGE UP (ref 37–47)
HCT VFR BLDA CALC: 38 % — SIGNIFICANT CHANGE UP (ref 34–44)
HGB BLD CALC-MCNC: 12 G/DL — LOW (ref 14–18)
HGB BLD-MCNC: 12 G/DL — SIGNIFICANT CHANGE UP (ref 12–16)
IMM GRANULOCYTES NFR BLD AUTO: 0.5 % — HIGH (ref 0.1–0.3)
INR BLD: 1.23 RATIO — SIGNIFICANT CHANGE UP (ref 0.65–1.3)
LACTATE BLDV-MCNC: 1 MMOL/L — SIGNIFICANT CHANGE UP (ref 0.5–1.6)
LYMPHOCYTES # BLD AUTO: 13.5 % — LOW (ref 20.5–51.1)
LYMPHOCYTES # BLD AUTO: 2.06 K/UL — SIGNIFICANT CHANGE UP (ref 1.2–3.4)
MCHC RBC-ENTMCNC: 30.5 PG — SIGNIFICANT CHANGE UP (ref 27–31)
MCHC RBC-ENTMCNC: 32.2 G/DL — SIGNIFICANT CHANGE UP (ref 32–37)
MCV RBC AUTO: 94.9 FL — SIGNIFICANT CHANGE UP (ref 81–99)
MONOCYTES # BLD AUTO: 1.59 K/UL — HIGH (ref 0.1–0.6)
MONOCYTES NFR BLD AUTO: 10.4 % — HIGH (ref 1.7–9.3)
NEUTROPHILS # BLD AUTO: 11.17 K/UL — HIGH (ref 1.4–6.5)
NEUTROPHILS NFR BLD AUTO: 73.4 % — SIGNIFICANT CHANGE UP (ref 42.2–75.2)
NRBC # BLD: 0 /100 WBCS — SIGNIFICANT CHANGE UP (ref 0–0)
PCO2 BLDV: 52 MMHG — HIGH (ref 41–51)
PH BLDV: 7.39 — SIGNIFICANT CHANGE UP (ref 7.26–7.43)
PLATELET # BLD AUTO: 256 K/UL — SIGNIFICANT CHANGE UP (ref 130–400)
PO2 BLDV: 15 MMHG — LOW (ref 20–40)
POTASSIUM BLDV-SCNC: 4 MMOL/L — SIGNIFICANT CHANGE UP (ref 3.3–5.6)
POTASSIUM SERPL-MCNC: 4.1 MMOL/L — SIGNIFICANT CHANGE UP (ref 3.5–5)
POTASSIUM SERPL-SCNC: 4.1 MMOL/L — SIGNIFICANT CHANGE UP (ref 3.5–5)
PROT SERPL-MCNC: 6.6 G/DL — SIGNIFICANT CHANGE UP (ref 6–8)
PROTHROM AB SERPL-ACNC: 14.1 SEC — HIGH (ref 9.95–12.87)
RBC # BLD: 3.93 M/UL — LOW (ref 4.2–5.4)
RBC # FLD: 15.1 % — HIGH (ref 11.5–14.5)
SAO2 % BLDV: 19 % — SIGNIFICANT CHANGE UP
SODIUM SERPL-SCNC: 141 MMOL/L — SIGNIFICANT CHANGE UP (ref 135–146)
TYPE + AB SCN PNL BLD: SIGNIFICANT CHANGE UP
WBC # BLD: 15.22 K/UL — HIGH (ref 4.8–10.8)
WBC # FLD AUTO: 15.22 K/UL — HIGH (ref 4.8–10.8)

## 2019-04-27 PROCEDURE — 99285 EMERGENCY DEPT VISIT HI MDM: CPT

## 2019-04-27 PROCEDURE — 71045 X-RAY EXAM CHEST 1 VIEW: CPT | Mod: 26

## 2019-04-27 RX ORDER — DOCUSATE SODIUM 100 MG
100 CAPSULE ORAL
Qty: 0 | Refills: 0 | Status: DISCONTINUED | OUTPATIENT
Start: 2019-04-27 | End: 2019-04-30

## 2019-04-27 RX ORDER — HEPARIN SODIUM 5000 [USP'U]/ML
5000 INJECTION INTRAVENOUS; SUBCUTANEOUS EVERY 8 HOURS
Qty: 0 | Refills: 0 | Status: DISCONTINUED | OUTPATIENT
Start: 2019-04-27 | End: 2019-04-28

## 2019-04-27 RX ORDER — ATENOLOL 25 MG/1
25 TABLET ORAL DAILY
Qty: 0 | Refills: 0 | Status: DISCONTINUED | OUTPATIENT
Start: 2019-04-27 | End: 2019-04-30

## 2019-04-27 RX ORDER — ESCITALOPRAM OXALATE 10 MG/1
10 TABLET, FILM COATED ORAL DAILY
Qty: 0 | Refills: 0 | Status: DISCONTINUED | OUTPATIENT
Start: 2019-04-27 | End: 2019-04-30

## 2019-04-27 RX ORDER — BACITRACIN ZINC 500 UNIT/G
1 OINTMENT IN PACKET (EA) TOPICAL
Qty: 0 | Refills: 0 | Status: DISCONTINUED | OUTPATIENT
Start: 2019-04-27 | End: 2019-04-30

## 2019-04-27 RX ORDER — PANTOPRAZOLE SODIUM 20 MG/1
40 TABLET, DELAYED RELEASE ORAL
Qty: 0 | Refills: 0 | Status: DISCONTINUED | OUTPATIENT
Start: 2019-04-27 | End: 2019-04-30

## 2019-04-27 RX ORDER — ATORVASTATIN CALCIUM 80 MG/1
40 TABLET, FILM COATED ORAL AT BEDTIME
Qty: 0 | Refills: 0 | Status: DISCONTINUED | OUTPATIENT
Start: 2019-04-27 | End: 2019-04-30

## 2019-04-27 RX ORDER — CLOPIDOGREL BISULFATE 75 MG/1
75 TABLET, FILM COATED ORAL DAILY
Qty: 0 | Refills: 0 | Status: DISCONTINUED | OUTPATIENT
Start: 2019-04-27 | End: 2019-04-30

## 2019-04-27 RX ORDER — ACETAMINOPHEN 500 MG
650 TABLET ORAL EVERY 6 HOURS
Qty: 0 | Refills: 0 | Status: DISCONTINUED | OUTPATIENT
Start: 2019-04-27 | End: 2019-04-30

## 2019-04-27 RX ADMIN — HEPARIN SODIUM 5000 UNIT(S): 5000 INJECTION INTRAVENOUS; SUBCUTANEOUS at 21:58

## 2019-04-27 RX ADMIN — Medication 100 MILLIGRAM(S): at 22:13

## 2019-04-27 RX ADMIN — Medication 100 MILLIGRAM(S): at 16:40

## 2019-04-27 NOTE — ED PROVIDER NOTE - CLINICAL SUMMARY MEDICAL DECISION MAKING FREE TEXT BOX
Patient presented with right groin mass s/p surgery by Dr. Fermin for right groin cyst. Otherwise afebrile, HD stable, no other symptoms except for pain in the area. (+) mild signs of infection on exam but no drainage. Spoke with burn who recommended labs which showed leukocytosis but no other significant findings. Per burn, they recommended IV abx and admission to Dr. Fermin. They will write admission orders. Patient and daughter at bedside agreeable with admission.

## 2019-04-27 NOTE — ED PROVIDER NOTE - NS ED ROS FT
Constitutional: See HPI. No fever/chills.  Eyes: No visual changes, eye pain or discharge.  ENT: No hearing changes, pain, discharge or infections.  Neck: No neck pain or stiffness.  Cardiac: No chest pain, SOB or edema. No chest pain with exertion.  Respiratory: No cough or respiratory distress. No hemoptysis.   GI: No nausea, vomiting, diarrhea or abdominal pain.  : No dysuria, frequency or burning. (+) hematuria (+) hard, red mass by R groin  MS: No myalgia, muscle weakness, joint pain or back pain.  Neuro: No headache or weakness. No LOC.  Skin: No rash.  Except as documented in the HPI, all other systems are negative.

## 2019-04-27 NOTE — ED PROVIDER NOTE - OBJECTIVE STATEMENT
89 y/o F PMH Afib, stented coronary artery, cataract, BC p/w hard, red mass by R groin. Pt had a cyst cleaned, drained, and closed by Dr. Fermin x 1 month ago near R groin. Pt has been experiencing hematuria x 2 days and today noted that top portion of where the incision was made to drain cyst was hard and red. Contacted Dr. Fermin who then referred pt to ED. Pt describes feeling some discomfort when she sits. Denies fevers, headache, vision changes, weakness/numbness, confusion, neck pain, chest pain, back pain, dyspnea, cough, palpitations, nausea, vomiting, abdominal pain, diarrhea, constipation, blood in stool/dark stools, leg swelling, rash, recent travel or sick contacts. 89 y/o F PMH Afib, stented coronary artery, cataract, BC p/w hard, red mass by R groin. Pt had a cyst cleaned, drained, and closed by Dr. Fermin x 1 month ago near R groin. Pt has been experiencing hematuria x 2 days and today noted that top portion of where the incision was made to drain cyst was hard and red. Contacted Dr. Fermin who then referred pt to ED. Pt describes feeling some discomfort in R groin when she sits; nonradiating. Denies fevers, headache, vision changes, weakness/numbness, confusion, neck pain, chest pain, back pain, dyspnea, cough, palpitations, nausea, vomiting, abdominal pain, diarrhea, constipation, blood in stool/dark stools, leg swelling, rash, recent travel or sick contacts. 89 y/o F PMH Afib, stented coronary artery, cataract, BC p/w hard, red mass by R groin. Pt had a cyst cleaned, drained, and closed by Dr. Fermin x 1 month ago near R groin. Pt has been experiencing hematuria x 2 days and today noted that top portion of where the incision was made to drain cyst was hard and red. Contacted Dr. Fermin who then referred pt to ED. Pt describes feeling some discomfort in R groin when she sits; nonradiating, achy, better with standing, worse with sitting, 4/10 at its worst. Denies fevers, headache, vision changes, weakness/numbness, confusion, neck pain, chest pain, back pain, dyspnea, cough, palpitations, nausea, vomiting, abdominal pain, diarrhea, constipation, blood in stool/dark stools, leg swelling, rash, recent travel or sick contacts.

## 2019-04-27 NOTE — PATIENT PROFILE ADULT - NSPROSPHOSPCHAPLAINYN_GEN_A_NUR
1. Take antibiotic as prescribed  2. Take claritin for 10 days  3. Take antacid for 3 days  4. Watch for signs of infection: warmth, red streaks, fever and return to clinic or go to ER  5.  Return to clinic as needed
no

## 2019-04-27 NOTE — ED PROVIDER NOTE - PHYSICAL EXAMINATION
Vital Signs: I have reviewed the initial vital signs.  Constitutional: NAD, well-nourished, appears stated age, no acute distress.  HEENT: Airway patent, moist MM, no erythema/swelling/deformity of oral structures. EOMI, PERRLA.  CV: regular rate, regular rhythm, well-perfused extremities, 2+ b/l DP and radial pulses equal.  Lungs: BCTA, no increased WOB.  ABD: NTND, no guarding or rebound, no pulsatile mass, no hernias.   : hard mass R groin, no fluctuance, no active bleeding or discharge, overlying erythema; spotted blood on pad.   MSK: Neck supple, nontender, nl ROM, no stepoff. Chest nontender. Back nontender in TLS spine or to b/l bony structures or flanks. Ext nontender, nl rom, no deformity.   INTEG: Skin warm, dry, no rash.  NEURO: A&Ox3, normal strength, nl sensation throughout, normal speech.   PSYCH: Calm, cooperative, normal affect and interaction.

## 2019-04-28 LAB
ANION GAP SERPL CALC-SCNC: 16 MMOL/L — HIGH (ref 7–14)
APPEARANCE UR: ABNORMAL
BACTERIA # UR AUTO: ABNORMAL /HPF
BASOPHILS # BLD AUTO: 0.04 K/UL — SIGNIFICANT CHANGE UP (ref 0–0.2)
BASOPHILS NFR BLD AUTO: 0.4 % — SIGNIFICANT CHANGE UP (ref 0–1)
BILIRUB UR-MCNC: NEGATIVE — SIGNIFICANT CHANGE UP
BUN SERPL-MCNC: 38 MG/DL — HIGH (ref 10–20)
CALCIUM SERPL-MCNC: 8.8 MG/DL — SIGNIFICANT CHANGE UP (ref 8.5–10.1)
CHLORIDE SERPL-SCNC: 100 MMOL/L — SIGNIFICANT CHANGE UP (ref 98–110)
CO2 SERPL-SCNC: 25 MMOL/L — SIGNIFICANT CHANGE UP (ref 17–32)
COLOR SPEC: YELLOW — SIGNIFICANT CHANGE UP
CREAT SERPL-MCNC: 1.6 MG/DL — HIGH (ref 0.7–1.5)
DIFF PNL FLD: ABNORMAL
EOSINOPHIL # BLD AUTO: 0.25 K/UL — SIGNIFICANT CHANGE UP (ref 0–0.7)
EOSINOPHIL NFR BLD AUTO: 2.5 % — SIGNIFICANT CHANGE UP (ref 0–8)
EPI CELLS # UR: ABNORMAL /HPF
GLUCOSE SERPL-MCNC: 110 MG/DL — HIGH (ref 70–99)
GLUCOSE UR QL: NEGATIVE MG/DL — SIGNIFICANT CHANGE UP
HCT VFR BLD CALC: 33.8 % — LOW (ref 37–47)
HGB BLD-MCNC: 10.6 G/DL — LOW (ref 12–16)
IMM GRANULOCYTES NFR BLD AUTO: 0.2 % — SIGNIFICANT CHANGE UP (ref 0.1–0.3)
KETONES UR-MCNC: NEGATIVE — SIGNIFICANT CHANGE UP
LEUKOCYTE ESTERASE UR-ACNC: ABNORMAL
LYMPHOCYTES # BLD AUTO: 2.03 K/UL — SIGNIFICANT CHANGE UP (ref 1.2–3.4)
LYMPHOCYTES # BLD AUTO: 20 % — LOW (ref 20.5–51.1)
MAGNESIUM SERPL-MCNC: 1.6 MG/DL — LOW (ref 1.8–2.4)
MCHC RBC-ENTMCNC: 29.9 PG — SIGNIFICANT CHANGE UP (ref 27–31)
MCHC RBC-ENTMCNC: 31.4 G/DL — LOW (ref 32–37)
MCV RBC AUTO: 95.5 FL — SIGNIFICANT CHANGE UP (ref 81–99)
MONOCYTES # BLD AUTO: 1.32 K/UL — HIGH (ref 0.1–0.6)
MONOCYTES NFR BLD AUTO: 13 % — HIGH (ref 1.7–9.3)
NEUTROPHILS # BLD AUTO: 6.5 K/UL — SIGNIFICANT CHANGE UP (ref 1.4–6.5)
NEUTROPHILS NFR BLD AUTO: 63.9 % — SIGNIFICANT CHANGE UP (ref 42.2–75.2)
NITRITE UR-MCNC: NEGATIVE — SIGNIFICANT CHANGE UP
NRBC # BLD: 0 /100 WBCS — SIGNIFICANT CHANGE UP (ref 0–0)
PH UR: 6 — SIGNIFICANT CHANGE UP (ref 5–8)
PHOSPHATE SERPL-MCNC: 4.9 MG/DL — SIGNIFICANT CHANGE UP (ref 2.1–4.9)
PLATELET # BLD AUTO: 212 K/UL — SIGNIFICANT CHANGE UP (ref 130–400)
POTASSIUM SERPL-MCNC: 3.9 MMOL/L — SIGNIFICANT CHANGE UP (ref 3.5–5)
POTASSIUM SERPL-SCNC: 3.9 MMOL/L — SIGNIFICANT CHANGE UP (ref 3.5–5)
PROT UR-MCNC: ABNORMAL MG/DL
RBC # BLD: 3.54 M/UL — LOW (ref 4.2–5.4)
RBC # FLD: 14.9 % — HIGH (ref 11.5–14.5)
RBC CASTS # UR COMP ASSIST: ABNORMAL /HPF
SODIUM SERPL-SCNC: 141 MMOL/L — SIGNIFICANT CHANGE UP (ref 135–146)
SP GR SPEC: 1.02 — SIGNIFICANT CHANGE UP (ref 1.01–1.03)
UROBILINOGEN FLD QL: 0.2 MG/DL — SIGNIFICANT CHANGE UP (ref 0.2–0.2)
WBC # BLD: 10.16 K/UL — SIGNIFICANT CHANGE UP (ref 4.8–10.8)
WBC # FLD AUTO: 10.16 K/UL — SIGNIFICANT CHANGE UP (ref 4.8–10.8)
WBC UR QL: >50 /HPF

## 2019-04-28 PROCEDURE — 71045 X-RAY EXAM CHEST 1 VIEW: CPT | Mod: 26

## 2019-04-28 RX ORDER — MAGNESIUM SULFATE 500 MG/ML
2 VIAL (ML) INJECTION ONCE
Qty: 0 | Refills: 0 | Status: COMPLETED | OUTPATIENT
Start: 2019-04-28 | End: 2019-04-28

## 2019-04-28 RX ORDER — SODIUM CHLORIDE 9 MG/ML
1000 INJECTION, SOLUTION INTRAVENOUS
Qty: 0 | Refills: 0 | Status: DISCONTINUED | OUTPATIENT
Start: 2019-04-28 | End: 2019-04-30

## 2019-04-28 RX ORDER — APIXABAN 2.5 MG/1
2.5 TABLET, FILM COATED ORAL EVERY 12 HOURS
Qty: 0 | Refills: 0 | Status: DISCONTINUED | OUTPATIENT
Start: 2019-04-28 | End: 2019-04-30

## 2019-04-28 RX ORDER — LOSARTAN POTASSIUM 100 MG/1
25 TABLET, FILM COATED ORAL DAILY
Qty: 0 | Refills: 0 | Status: DISCONTINUED | OUTPATIENT
Start: 2019-04-28 | End: 2019-04-30

## 2019-04-28 RX ORDER — ACETAMINOPHEN 500 MG
650 TABLET ORAL EVERY 6 HOURS
Qty: 0 | Refills: 0 | Status: DISCONTINUED | OUTPATIENT
Start: 2019-04-28 | End: 2019-04-28

## 2019-04-28 RX ORDER — ACETAMINOPHEN 500 MG
650 TABLET ORAL
Qty: 0 | Refills: 0 | Status: DISCONTINUED | OUTPATIENT
Start: 2019-04-28 | End: 2019-04-30

## 2019-04-28 RX ORDER — CHLORTHALIDONE 50 MG
25 TABLET ORAL DAILY
Qty: 0 | Refills: 0 | Status: DISCONTINUED | OUTPATIENT
Start: 2019-04-28 | End: 2019-04-30

## 2019-04-28 RX ORDER — SODIUM CHLORIDE 9 MG/ML
1000 INJECTION, SOLUTION INTRAVENOUS
Qty: 0 | Refills: 0 | Status: DISCONTINUED | OUTPATIENT
Start: 2019-04-28 | End: 2019-04-28

## 2019-04-28 RX ADMIN — Medication 50 GRAM(S): at 23:47

## 2019-04-28 RX ADMIN — Medication 1 APPLICATION(S): at 09:43

## 2019-04-28 RX ADMIN — PANTOPRAZOLE SODIUM 40 MILLIGRAM(S): 20 TABLET, DELAYED RELEASE ORAL at 06:04

## 2019-04-28 RX ADMIN — SODIUM CHLORIDE 75 MILLILITER(S): 9 INJECTION, SOLUTION INTRAVENOUS at 22:00

## 2019-04-28 RX ADMIN — ATENOLOL 25 MILLIGRAM(S): 25 TABLET ORAL at 06:04

## 2019-04-28 RX ADMIN — Medication 100 MILLIGRAM(S): at 06:04

## 2019-04-28 RX ADMIN — ESCITALOPRAM OXALATE 10 MILLIGRAM(S): 10 TABLET, FILM COATED ORAL at 11:58

## 2019-04-28 RX ADMIN — Medication 100 MILLIGRAM(S): at 23:17

## 2019-04-28 RX ADMIN — APIXABAN 2.5 MILLIGRAM(S): 2.5 TABLET, FILM COATED ORAL at 06:04

## 2019-04-28 RX ADMIN — ATORVASTATIN CALCIUM 40 MILLIGRAM(S): 80 TABLET, FILM COATED ORAL at 21:49

## 2019-04-28 RX ADMIN — Medication 1 APPLICATION(S): at 18:35

## 2019-04-28 RX ADMIN — Medication 650 MILLIGRAM(S): at 09:38

## 2019-04-28 RX ADMIN — APIXABAN 2.5 MILLIGRAM(S): 2.5 TABLET, FILM COATED ORAL at 17:48

## 2019-04-28 RX ADMIN — Medication 100 MILLIGRAM(S): at 09:22

## 2019-04-28 RX ADMIN — Medication 25 MILLIGRAM(S): at 13:31

## 2019-04-28 RX ADMIN — CLOPIDOGREL BISULFATE 75 MILLIGRAM(S): 75 TABLET, FILM COATED ORAL at 11:58

## 2019-04-28 RX ADMIN — Medication 650 MILLIGRAM(S): at 17:48

## 2019-04-28 RX ADMIN — LOSARTAN POTASSIUM 25 MILLIGRAM(S): 100 TABLET, FILM COATED ORAL at 06:04

## 2019-04-28 RX ADMIN — Medication 1 TABLET(S): at 11:58

## 2019-04-28 RX ADMIN — Medication 100 MILLIGRAM(S): at 16:53

## 2019-04-28 NOTE — H&P ADULT - HISTORY OF PRESENT ILLNESS
89 y/o F PMH Afib, stented coronary artery, cataract, BC p/w hard, red mass by R groin. Pt had a cyst cleaned, drained, and closed by Dr. Fermin x 1 month ago near R groin. Pt has been experiencing hematuria x 2 days and today noted that top portion of where the incision was made to drain cyst was hard and red. Contacted Dr. Fermin who then referred pt to ED. Pt describes feeling some discomfort in R groin when she sits; nonradiating, achy, better with standing, worse with sitting, 4/10 at its worst. Denies fevers, headache, vision changes, weakness/numbness, confusion, neck pain, chest pain, back pain, dyspnea, cough, palpitations, nausea, vomiting, abdominal pain, diarrhea, constipation, blood in stool/dark stools, leg swelling, rash, recent travel or sick contacts.

## 2019-04-28 NOTE — H&P ADULT - ASSESSMENT
89F presenting with right groin cyst.    - Admit to Burn Surgery, Dr. Fermin.   - Will continue IV antibiotics.  - Local wound care with bacitracin.    - Regular diet as tolerated.   - Plan for re-evaluation later in week and possible OR if not improving.

## 2019-04-28 NOTE — H&P ADULT - NSHPLABSRESULTS_GEN_ALL_CORE
Labs:                       12.0   15.22 )-----------( 256      ( 27 Apr 2019 15:30 )             37.3   04-27    141  |  100  |  34<H>  ----------------------------<  101<H>  4.1   |  27  |  1.1    Ca    10.0      27 Apr 2019 15:30    TPro  6.6  /  Alb  4.0  /  TBili  0.4  /  DBili  x   /  AST  16  /  ALT  14  /  AlkPhos  75  04-27  PT/INR - ( 27 Apr 2019 15:30 )   PT: 14.10 sec;   INR: 1.23 ratio         PTT - ( 27 Apr 2019 15:30 )  PTT:36.5 sec

## 2019-04-28 NOTE — PROGRESS NOTE ADULT - SUBJECTIVE AND OBJECTIVE BOX
stable    Vital Signs Last 24 Hrs  T(C): 36.7 (28 Apr 2019 04:38), Max: 36.7 (28 Apr 2019 04:38)  T(F): 98 (28 Apr 2019 04:38), Max: 98 (28 Apr 2019 04:38)  HR: 91 (28 Apr 2019 04:38) (62 - 91)  BP: 140/63 (28 Apr 2019 04:38) (122/60 - 145/67)  BP(mean): --  RR: 18 (28 Apr 2019 09:33) (16 - 18)  SpO2: 95% (28 Apr 2019 09:33) (93% - 96%)    CVP:  T(C): 36.7 (04-28-19 @ 04:38), Max: 36.7 (04-28-19 @ 04:38)  HR: 91 (04-28-19 @ 04:38) (62 - 91)  BP: 140/63 (04-28-19 @ 04:38) (122/60 - 145/67)  RR: 18 (04-28-19 @ 09:33) (16 - 18)  SpO2: 95% (04-28-19 @ 09:33) (93% - 96%)  CVP(mm Hg): --    U.O.:  I&O's Detail                                    12.0   15.22 )-----------( 256      ( 27 Apr 2019 15:30 )             37.3     04-27    141  |  100  |  34<H>  ----------------------------<  101<H>  4.1   |  27  |  1.1    Ca    10.0      27 Apr 2019 15:30    TPro  6.6  /  Alb  4.0  /  TBili  0.4  /  DBili  x   /  AST  16  /  ALT  14  /  AlkPhos  75  04-27      Large Dressing Change--> pubis--> decreased edema and erythema    right labia--> decreased tenderness and drainage post suture removal

## 2019-04-29 LAB
-  AMIKACIN: SIGNIFICANT CHANGE UP
-  AMIKACIN: SIGNIFICANT CHANGE UP
-  AMPICILLIN/SULBACTAM: SIGNIFICANT CHANGE UP
-  AMPICILLIN/SULBACTAM: SIGNIFICANT CHANGE UP
-  AMPICILLIN: SIGNIFICANT CHANGE UP
-  AMPICILLIN: SIGNIFICANT CHANGE UP
-  AZTREONAM: SIGNIFICANT CHANGE UP
-  AZTREONAM: SIGNIFICANT CHANGE UP
-  CEFAZOLIN: SIGNIFICANT CHANGE UP
-  CEFAZOLIN: SIGNIFICANT CHANGE UP
-  CEFEPIME: SIGNIFICANT CHANGE UP
-  CEFEPIME: SIGNIFICANT CHANGE UP
-  CEFOXITIN: SIGNIFICANT CHANGE UP
-  CEFOXITIN: SIGNIFICANT CHANGE UP
-  CEFTRIAXONE: SIGNIFICANT CHANGE UP
-  CEFTRIAXONE: SIGNIFICANT CHANGE UP
-  CIPROFLOXACIN: SIGNIFICANT CHANGE UP
-  CIPROFLOXACIN: SIGNIFICANT CHANGE UP
-  ERTAPENEM: SIGNIFICANT CHANGE UP
-  ERTAPENEM: SIGNIFICANT CHANGE UP
-  GENTAMICIN: SIGNIFICANT CHANGE UP
-  GENTAMICIN: SIGNIFICANT CHANGE UP
-  IMIPENEM: SIGNIFICANT CHANGE UP
-  IMIPENEM: SIGNIFICANT CHANGE UP
-  LEVOFLOXACIN: SIGNIFICANT CHANGE UP
-  LEVOFLOXACIN: SIGNIFICANT CHANGE UP
-  MEROPENEM: SIGNIFICANT CHANGE UP
-  MEROPENEM: SIGNIFICANT CHANGE UP
-  PIPERACILLIN/TAZOBACTAM: SIGNIFICANT CHANGE UP
-  PIPERACILLIN/TAZOBACTAM: SIGNIFICANT CHANGE UP
-  TOBRAMYCIN: SIGNIFICANT CHANGE UP
-  TOBRAMYCIN: SIGNIFICANT CHANGE UP
-  TRIMETHOPRIM/SULFAMETHOXAZOLE: SIGNIFICANT CHANGE UP
-  TRIMETHOPRIM/SULFAMETHOXAZOLE: SIGNIFICANT CHANGE UP
ANION GAP SERPL CALC-SCNC: 13 MMOL/L — SIGNIFICANT CHANGE UP (ref 7–14)
BLD GP AB SCN SERPL QL: SIGNIFICANT CHANGE UP
BUN SERPL-MCNC: 34 MG/DL — HIGH (ref 10–20)
CALCIUM SERPL-MCNC: 9.1 MG/DL — SIGNIFICANT CHANGE UP (ref 8.5–10.1)
CHLORIDE SERPL-SCNC: 96 MMOL/L — LOW (ref 98–110)
CO2 SERPL-SCNC: 29 MMOL/L — SIGNIFICANT CHANGE UP (ref 17–32)
CREAT SERPL-MCNC: 1.2 MG/DL — SIGNIFICANT CHANGE UP (ref 0.7–1.5)
GLUCOSE SERPL-MCNC: 94 MG/DL — SIGNIFICANT CHANGE UP (ref 70–99)
HCT VFR BLD CALC: 35.2 % — LOW (ref 37–47)
HGB BLD-MCNC: 11.2 G/DL — LOW (ref 12–16)
MAGNESIUM SERPL-MCNC: 2.2 MG/DL — SIGNIFICANT CHANGE UP (ref 1.8–2.4)
MCHC RBC-ENTMCNC: 30.2 PG — SIGNIFICANT CHANGE UP (ref 27–31)
MCHC RBC-ENTMCNC: 31.8 G/DL — LOW (ref 32–37)
MCV RBC AUTO: 94.9 FL — SIGNIFICANT CHANGE UP (ref 81–99)
METHOD TYPE: SIGNIFICANT CHANGE UP
METHOD TYPE: SIGNIFICANT CHANGE UP
NRBC # BLD: 0 /100 WBCS — SIGNIFICANT CHANGE UP (ref 0–0)
PHOSPHATE SERPL-MCNC: 4.4 MG/DL — SIGNIFICANT CHANGE UP (ref 2.1–4.9)
PLATELET # BLD AUTO: 245 K/UL — SIGNIFICANT CHANGE UP (ref 130–400)
POTASSIUM SERPL-MCNC: 3.9 MMOL/L — SIGNIFICANT CHANGE UP (ref 3.5–5)
POTASSIUM SERPL-SCNC: 3.9 MMOL/L — SIGNIFICANT CHANGE UP (ref 3.5–5)
RBC # BLD: 3.71 M/UL — LOW (ref 4.2–5.4)
RBC # FLD: 14.6 % — HIGH (ref 11.5–14.5)
SODIUM SERPL-SCNC: 138 MMOL/L — SIGNIFICANT CHANGE UP (ref 135–146)
TYPE + AB SCN PNL BLD: SIGNIFICANT CHANGE UP
WBC # BLD: 10.61 K/UL — SIGNIFICANT CHANGE UP (ref 4.8–10.8)
WBC # FLD AUTO: 10.61 K/UL — SIGNIFICANT CHANGE UP (ref 4.8–10.8)

## 2019-04-29 RX ORDER — SODIUM CHLORIDE 9 MG/ML
1000 INJECTION, SOLUTION INTRAVENOUS
Qty: 0 | Refills: 0 | Status: DISCONTINUED | OUTPATIENT
Start: 2019-04-30 | End: 2019-04-30

## 2019-04-29 RX ADMIN — ATENOLOL 25 MILLIGRAM(S): 25 TABLET ORAL at 05:35

## 2019-04-29 RX ADMIN — ESCITALOPRAM OXALATE 10 MILLIGRAM(S): 10 TABLET, FILM COATED ORAL at 11:03

## 2019-04-29 RX ADMIN — CLOPIDOGREL BISULFATE 75 MILLIGRAM(S): 75 TABLET, FILM COATED ORAL at 17:39

## 2019-04-29 RX ADMIN — Medication 100 MILLIGRAM(S): at 17:39

## 2019-04-29 RX ADMIN — ATORVASTATIN CALCIUM 40 MILLIGRAM(S): 80 TABLET, FILM COATED ORAL at 21:00

## 2019-04-29 RX ADMIN — PANTOPRAZOLE SODIUM 40 MILLIGRAM(S): 20 TABLET, DELAYED RELEASE ORAL at 05:35

## 2019-04-29 RX ADMIN — Medication 100 MILLIGRAM(S): at 05:35

## 2019-04-29 RX ADMIN — Medication 100 MILLIGRAM(S): at 08:29

## 2019-04-29 RX ADMIN — Medication 25 MILLIGRAM(S): at 05:35

## 2019-04-29 RX ADMIN — Medication 650 MILLIGRAM(S): at 05:35

## 2019-04-29 RX ADMIN — LOSARTAN POTASSIUM 25 MILLIGRAM(S): 100 TABLET, FILM COATED ORAL at 05:35

## 2019-04-29 RX ADMIN — Medication 650 MILLIGRAM(S): at 17:47

## 2019-04-29 RX ADMIN — Medication 1 TABLET(S): at 11:04

## 2019-04-29 RX ADMIN — Medication 100 MILLIGRAM(S): at 23:51

## 2019-04-29 RX ADMIN — APIXABAN 2.5 MILLIGRAM(S): 2.5 TABLET, FILM COATED ORAL at 05:35

## 2019-04-29 NOTE — PROGRESS NOTE ADULT - SUBJECTIVE AND OBJECTIVE BOX
ADDENDUM    PT SEEN AND EXAMINED SATURDAY 4/27/19    PE; pubis and labia--> new erythema --> suture removed

## 2019-04-29 NOTE — PROGRESS NOTE ADULT - SUBJECTIVE AND OBJECTIVE BOX
stable--> new purulent drainage labia/ clitorus    Vital Signs Last 24 Hrs  T(C): 36 (29 Apr 2019 05:00), Max: 36.5 (28 Apr 2019 12:58)  T(F): 96.8 (29 Apr 2019 05:00), Max: 97.7 (28 Apr 2019 12:58)  HR: 60 (29 Apr 2019 05:00) (56 - 60)  BP: 125/61 (29 Apr 2019 05:00) (92/50 - 125/61)  BP(mean): --  RR: 18 (29 Apr 2019 05:00) (18 - 18)  SpO2: 94% (29 Apr 2019 05:00) (94% - 94%)    CVP:  T(C): 36 (04-29-19 @ 05:00), Max: 36.5 (04-28-19 @ 12:58)  HR: 60 (04-29-19 @ 05:00) (56 - 60)  BP: 125/61 (04-29-19 @ 05:00) (92/50 - 125/61)  RR: 18 (04-29-19 @ 05:00) (18 - 18)  SpO2: 94% (04-29-19 @ 05:00) (94% - 94%)  CVP(mm Hg): --    U.O.:  I&O's Detail    28 Apr 2019 07:01  -  29 Apr 2019 07:00  --------------------------------------------------------  IN:    lactated ringers.: 225 mL    Oral Fluid: 240 mL  Total IN: 465 mL    OUT:  Total OUT: 0 mL    Total NET: 465 mL                                        10.6   10.16 )-----------( 212      ( 28 Apr 2019 19:06 )             33.8     04-28    141  |  100  |  38<H>  ----------------------------<  110<H>  3.9   |  25  |  1.6<H>    Ca    8.8      28 Apr 2019 19:06  Phos  4.9     04-28  Mg     1.6     04-28    TPro  6.6  /  Alb  4.0  /  TBili  0.4  /  DBili  x   /  AST  16  /  ALT  14  /  AlkPhos  75  04-27      Large Dressing Change--> new drainage labia/ clitorus

## 2019-04-30 ENCOUNTER — RESULT REVIEW (OUTPATIENT)
Age: 84
End: 2019-04-30

## 2019-04-30 LAB
-  AMPICILLIN/SULBACTAM: SIGNIFICANT CHANGE UP
-  CEFAZOLIN: SIGNIFICANT CHANGE UP
-  CLINDAMYCIN: SIGNIFICANT CHANGE UP
-  DAPTOMYCIN: SIGNIFICANT CHANGE UP
-  ERYTHROMYCIN: SIGNIFICANT CHANGE UP
-  GENTAMICIN: SIGNIFICANT CHANGE UP
-  LINEZOLID: SIGNIFICANT CHANGE UP
-  OXACILLIN: SIGNIFICANT CHANGE UP
-  PENICILLIN: SIGNIFICANT CHANGE UP
-  RIFAMPIN: SIGNIFICANT CHANGE UP
-  TETRACYCLINE: SIGNIFICANT CHANGE UP
-  TRIMETHOPRIM/SULFAMETHOXAZOLE: SIGNIFICANT CHANGE UP
-  VANCOMYCIN: SIGNIFICANT CHANGE UP
ANION GAP SERPL CALC-SCNC: 17 MMOL/L — HIGH (ref 7–14)
BASOPHILS # BLD AUTO: 0.02 K/UL — SIGNIFICANT CHANGE UP (ref 0–0.2)
BASOPHILS NFR BLD AUTO: 0.2 % — SIGNIFICANT CHANGE UP (ref 0–1)
BUN SERPL-MCNC: 29 MG/DL — HIGH (ref 10–20)
CALCIUM SERPL-MCNC: 8.6 MG/DL — SIGNIFICANT CHANGE UP (ref 8.5–10.1)
CHLORIDE SERPL-SCNC: 99 MMOL/L — SIGNIFICANT CHANGE UP (ref 98–110)
CO2 SERPL-SCNC: 22 MMOL/L — SIGNIFICANT CHANGE UP (ref 17–32)
CREAT SERPL-MCNC: 1 MG/DL — SIGNIFICANT CHANGE UP (ref 0.7–1.5)
CULTURE RESULTS: SIGNIFICANT CHANGE UP
EOSINOPHIL # BLD AUTO: 0.01 K/UL — SIGNIFICANT CHANGE UP (ref 0–0.7)
EOSINOPHIL NFR BLD AUTO: 0.1 % — SIGNIFICANT CHANGE UP (ref 0–8)
GLUCOSE BLDC GLUCOMTR-MCNC: 153 MG/DL — HIGH (ref 70–99)
GLUCOSE SERPL-MCNC: 193 MG/DL — HIGH (ref 70–99)
HCT VFR BLD CALC: 33.8 % — LOW (ref 37–47)
HGB BLD-MCNC: 10.7 G/DL — LOW (ref 12–16)
IMM GRANULOCYTES NFR BLD AUTO: 0.8 % — HIGH (ref 0.1–0.3)
LYMPHOCYTES # BLD AUTO: 0.62 K/UL — LOW (ref 1.2–3.4)
LYMPHOCYTES # BLD AUTO: 6.7 % — LOW (ref 20.5–51.1)
MAGNESIUM SERPL-MCNC: 1.8 MG/DL — SIGNIFICANT CHANGE UP (ref 1.8–2.4)
MCHC RBC-ENTMCNC: 30.1 PG — SIGNIFICANT CHANGE UP (ref 27–31)
MCHC RBC-ENTMCNC: 31.7 G/DL — LOW (ref 32–37)
MCV RBC AUTO: 94.9 FL — SIGNIFICANT CHANGE UP (ref 81–99)
METHOD TYPE: SIGNIFICANT CHANGE UP
MONOCYTES # BLD AUTO: 0.2 K/UL — SIGNIFICANT CHANGE UP (ref 0.1–0.6)
MONOCYTES NFR BLD AUTO: 2.2 % — SIGNIFICANT CHANGE UP (ref 1.7–9.3)
NEUTROPHILS # BLD AUTO: 8.37 K/UL — HIGH (ref 1.4–6.5)
NEUTROPHILS NFR BLD AUTO: 90 % — HIGH (ref 42.2–75.2)
NRBC # BLD: 0 /100 WBCS — SIGNIFICANT CHANGE UP (ref 0–0)
ORGANISM # SPEC MICROSCOPIC CNT: SIGNIFICANT CHANGE UP
PHOSPHATE SERPL-MCNC: 4.2 MG/DL — SIGNIFICANT CHANGE UP (ref 2.1–4.9)
PLATELET # BLD AUTO: 223 K/UL — SIGNIFICANT CHANGE UP (ref 130–400)
POTASSIUM SERPL-MCNC: 4.3 MMOL/L — SIGNIFICANT CHANGE UP (ref 3.5–5)
POTASSIUM SERPL-SCNC: 4.3 MMOL/L — SIGNIFICANT CHANGE UP (ref 3.5–5)
RBC # BLD: 3.56 M/UL — LOW (ref 4.2–5.4)
RBC # FLD: 14.4 % — SIGNIFICANT CHANGE UP (ref 11.5–14.5)
SODIUM SERPL-SCNC: 138 MMOL/L — SIGNIFICANT CHANGE UP (ref 135–146)
SPECIMEN SOURCE: SIGNIFICANT CHANGE UP
WBC # BLD: 9.29 K/UL — SIGNIFICANT CHANGE UP (ref 4.8–10.8)
WBC # FLD AUTO: 9.29 K/UL — SIGNIFICANT CHANGE UP (ref 4.8–10.8)

## 2019-04-30 PROCEDURE — 71045 X-RAY EXAM CHEST 1 VIEW: CPT | Mod: 26

## 2019-04-30 PROCEDURE — 93306 TTE W/DOPPLER COMPLETE: CPT | Mod: 26

## 2019-04-30 PROCEDURE — 88304 TISSUE EXAM BY PATHOLOGIST: CPT | Mod: 26

## 2019-04-30 RX ORDER — ONDANSETRON 8 MG/1
4 TABLET, FILM COATED ORAL
Qty: 0 | Refills: 0 | Status: DISCONTINUED | OUTPATIENT
Start: 2019-04-30 | End: 2019-04-30

## 2019-04-30 RX ORDER — PANTOPRAZOLE SODIUM 20 MG/1
40 TABLET, DELAYED RELEASE ORAL
Qty: 0 | Refills: 0 | Status: DISCONTINUED | OUTPATIENT
Start: 2019-04-30 | End: 2019-05-04

## 2019-04-30 RX ORDER — ATORVASTATIN CALCIUM 80 MG/1
40 TABLET, FILM COATED ORAL AT BEDTIME
Qty: 0 | Refills: 0 | Status: DISCONTINUED | OUTPATIENT
Start: 2019-04-30 | End: 2019-05-04

## 2019-04-30 RX ORDER — DOCUSATE SODIUM 100 MG
100 CAPSULE ORAL
Qty: 0 | Refills: 0 | Status: DISCONTINUED | OUTPATIENT
Start: 2019-04-30 | End: 2019-05-04

## 2019-04-30 RX ORDER — MORPHINE SULFATE 50 MG/1
2 CAPSULE, EXTENDED RELEASE ORAL
Qty: 0 | Refills: 0 | Status: DISCONTINUED | OUTPATIENT
Start: 2019-04-30 | End: 2019-04-30

## 2019-04-30 RX ORDER — ACETAMINOPHEN 500 MG
650 TABLET ORAL EVERY 6 HOURS
Qty: 0 | Refills: 0 | Status: DISCONTINUED | OUTPATIENT
Start: 2019-04-30 | End: 2019-05-04

## 2019-04-30 RX ORDER — ATENOLOL 25 MG/1
25 TABLET ORAL DAILY
Qty: 0 | Refills: 0 | Status: DISCONTINUED | OUTPATIENT
Start: 2019-04-30 | End: 2019-05-04

## 2019-04-30 RX ORDER — SODIUM CHLORIDE 9 MG/ML
1000 INJECTION, SOLUTION INTRAVENOUS
Qty: 0 | Refills: 0 | Status: DISCONTINUED | OUTPATIENT
Start: 2019-04-30 | End: 2019-04-30

## 2019-04-30 RX ORDER — BACITRACIN ZINC 500 UNIT/G
1 OINTMENT IN PACKET (EA) TOPICAL
Qty: 0 | Refills: 0 | Status: DISCONTINUED | OUTPATIENT
Start: 2019-04-30 | End: 2019-05-04

## 2019-04-30 RX ORDER — CLOPIDOGREL BISULFATE 75 MG/1
75 TABLET, FILM COATED ORAL DAILY
Qty: 0 | Refills: 0 | Status: DISCONTINUED | OUTPATIENT
Start: 2019-04-30 | End: 2019-05-04

## 2019-04-30 RX ORDER — LOSARTAN POTASSIUM 100 MG/1
25 TABLET, FILM COATED ORAL DAILY
Qty: 0 | Refills: 0 | Status: DISCONTINUED | OUTPATIENT
Start: 2019-04-30 | End: 2019-05-04

## 2019-04-30 RX ORDER — APIXABAN 2.5 MG/1
2.5 TABLET, FILM COATED ORAL EVERY 12 HOURS
Qty: 0 | Refills: 0 | Status: DISCONTINUED | OUTPATIENT
Start: 2019-04-30 | End: 2019-05-04

## 2019-04-30 RX ORDER — MORPHINE SULFATE 50 MG/1
4 CAPSULE, EXTENDED RELEASE ORAL
Qty: 0 | Refills: 0 | Status: DISCONTINUED | OUTPATIENT
Start: 2019-04-30 | End: 2019-04-30

## 2019-04-30 RX ORDER — ESCITALOPRAM OXALATE 10 MG/1
10 TABLET, FILM COATED ORAL DAILY
Qty: 0 | Refills: 0 | Status: DISCONTINUED | OUTPATIENT
Start: 2019-04-30 | End: 2019-05-04

## 2019-04-30 RX ADMIN — Medication 1 TABLET(S): at 18:24

## 2019-04-30 RX ADMIN — SODIUM CHLORIDE 100 MILLILITER(S): 9 INJECTION, SOLUTION INTRAVENOUS at 14:50

## 2019-04-30 RX ADMIN — Medication 650 MILLIGRAM(S): at 05:33

## 2019-04-30 RX ADMIN — PANTOPRAZOLE SODIUM 40 MILLIGRAM(S): 20 TABLET, DELAYED RELEASE ORAL at 05:34

## 2019-04-30 RX ADMIN — LOSARTAN POTASSIUM 25 MILLIGRAM(S): 100 TABLET, FILM COATED ORAL at 05:34

## 2019-04-30 RX ADMIN — Medication 650 MILLIGRAM(S): at 18:25

## 2019-04-30 RX ADMIN — ATENOLOL 25 MILLIGRAM(S): 25 TABLET ORAL at 05:34

## 2019-04-30 RX ADMIN — SODIUM CHLORIDE 75 MILLILITER(S): 9 INJECTION, SOLUTION INTRAVENOUS at 00:51

## 2019-04-30 RX ADMIN — APIXABAN 2.5 MILLIGRAM(S): 2.5 TABLET, FILM COATED ORAL at 18:24

## 2019-04-30 RX ADMIN — ATORVASTATIN CALCIUM 40 MILLIGRAM(S): 80 TABLET, FILM COATED ORAL at 22:39

## 2019-04-30 RX ADMIN — Medication 100 MILLIGRAM(S): at 22:40

## 2019-04-30 RX ADMIN — Medication 100 MILLIGRAM(S): at 11:29

## 2019-04-30 RX ADMIN — Medication 650 MILLIGRAM(S): at 18:27

## 2019-04-30 RX ADMIN — ESCITALOPRAM OXALATE 10 MILLIGRAM(S): 10 TABLET, FILM COATED ORAL at 11:36

## 2019-04-30 RX ADMIN — Medication 25 MILLIGRAM(S): at 05:33

## 2019-04-30 RX ADMIN — Medication 100 MILLIGRAM(S): at 12:50

## 2019-04-30 NOTE — CHART NOTE - NSCHARTNOTEFT_GEN_A_CORE
Called by RN to evaluate patient s/p OR for hypoxia. Per RN patient was not requiring supplemental oxygen prior to OR, however after returning from PACU pulse-ox reading was 88% on RA. Patient placed on 2L nasal cannula and O2 returned to 95%. Patient is evaluated by myself and Radha Jolley at bedside. Patient has removed nasal cannula and is speaking in full sentences with minimal difficulty. Patient's voice is noted to be coarse, and cough sounds productive, however patient is not producing any sputum. Patient denies SOB or difficulty breathing, but does note feeling as though she was wheezing immediately after the case, and notes changes in her voice. Denies sore throat. On auscultation lung sounds are distant, but no wheezing, crackles or rhonchi are appreciated. Anesthesia is called to discuss OR course. Per anesthesia patient underwent general anesthesia with uncomplicated LMA placement. Patient received ~600-700cc LR during the case. Patient maintained saturations throughout the case and recovered from GA without complication. CXR ordered and patient placed back on 2L humidified O2 via NC. Will continue to follow closely.

## 2019-04-30 NOTE — PRE-ANESTHESIA EVALUATION ADULT - NSANTHOSAYNRD_GEN_A_CORE
not screened/No. HOLLIS screening performed.  STOP BANG Legend: 0-2 = LOW Risk; 3-4 = INTERMEDIATE Risk; 5-8 = HIGH Risk

## 2019-04-30 NOTE — CHART NOTE - NSCHARTNOTEFT_GEN_A_CORE
Continuous pulse-oximetry ordered and request for patient transfer to Burn Unit for monitoring overnight placed. Patient's daughter (HCP) refusing transfer to BICU. Discussed with daughter that the above monitoring was not available on the medical floor, and given post-operative events felt it was important that the patient be monitored in the Burn Unit overnight. Despite education, family is refusing transfer at this time.

## 2019-04-30 NOTE — PRE-ANESTHESIA EVALUATION ADULT - NSANTHADDINFOFT_GEN_ALL_CORE
89 y/o WF evaluated for debridement of perineal abscess.  ASA 3.  Plan GA.  Plan, benefits, foreseeable risks, viable alternatives discussed with patient and all her pertinent questions answered and she understands and elects to proceed.

## 2019-05-01 RX ADMIN — ATORVASTATIN CALCIUM 40 MILLIGRAM(S): 80 TABLET, FILM COATED ORAL at 22:38

## 2019-05-01 RX ADMIN — ATENOLOL 25 MILLIGRAM(S): 25 TABLET ORAL at 06:53

## 2019-05-01 RX ADMIN — Medication 650 MILLIGRAM(S): at 06:54

## 2019-05-01 RX ADMIN — Medication 100 MILLIGRAM(S): at 06:53

## 2019-05-01 RX ADMIN — Medication 650 MILLIGRAM(S): at 00:42

## 2019-05-01 RX ADMIN — Medication 100 MILLIGRAM(S): at 14:15

## 2019-05-01 RX ADMIN — Medication 650 MILLIGRAM(S): at 07:22

## 2019-05-01 RX ADMIN — ESCITALOPRAM OXALATE 10 MILLIGRAM(S): 10 TABLET, FILM COATED ORAL at 12:26

## 2019-05-01 RX ADMIN — LOSARTAN POTASSIUM 25 MILLIGRAM(S): 100 TABLET, FILM COATED ORAL at 06:53

## 2019-05-01 RX ADMIN — PANTOPRAZOLE SODIUM 40 MILLIGRAM(S): 20 TABLET, DELAYED RELEASE ORAL at 06:53

## 2019-05-01 RX ADMIN — Medication 100 MILLIGRAM(S): at 22:38

## 2019-05-01 RX ADMIN — CLOPIDOGREL BISULFATE 75 MILLIGRAM(S): 75 TABLET, FILM COATED ORAL at 12:25

## 2019-05-01 RX ADMIN — Medication 100 MILLIGRAM(S): at 18:19

## 2019-05-01 RX ADMIN — APIXABAN 2.5 MILLIGRAM(S): 2.5 TABLET, FILM COATED ORAL at 06:53

## 2019-05-01 RX ADMIN — Medication 1 TABLET(S): at 12:27

## 2019-05-01 RX ADMIN — Medication 100 MILLIGRAM(S): at 07:06

## 2019-05-01 RX ADMIN — Medication 650 MILLIGRAM(S): at 01:00

## 2019-05-01 RX ADMIN — Medication 650 MILLIGRAM(S): at 18:18

## 2019-05-01 RX ADMIN — APIXABAN 2.5 MILLIGRAM(S): 2.5 TABLET, FILM COATED ORAL at 18:18

## 2019-05-01 NOTE — PROGRESS NOTE ADULT - ASSESSMENT
recurrent abscess right labia and clitoris post debridement and drainage--> stable--> local wound care, iv abx

## 2019-05-01 NOTE — PROGRESS NOTE ADULT - SUBJECTIVE AND OBJECTIVE BOX
stable POD#1    Vital Signs Last 24 Hrs  T(C): 36.6 (01 May 2019 20:29), Max: 36.6 (01 May 2019 12:15)  T(F): 97.8 (01 May 2019 20:29), Max: 97.9 (01 May 2019 12:15)  HR: 59 (01 May 2019 20:29) (59 - 61)  BP: 139/65 (01 May 2019 20:29) (137/63 - 148/69)  BP(mean): --  RR: 18 (01 May 2019 20:29) (18 - 18)  SpO2: 95% (01 May 2019 08:12) (95% - 95%)    CVP:  T(C): 36.6 (05-01-19 @ 20:29), Max: 36.6 (05-01-19 @ 12:15)  HR: 59 (05-01-19 @ 20:29) (59 - 61)  BP: 139/65 (05-01-19 @ 20:29) (137/63 - 148/69)  RR: 18 (05-01-19 @ 20:29) (18 - 18)  SpO2: 95% (05-01-19 @ 08:12) (95% - 95%)  CVP(mm Hg): --    U.O.:  I&O's Detail    30 Apr 2019 07:01  -  01 May 2019 07:00  --------------------------------------------------------  IN:    lactated ringers.: 125 mL  Total IN: 125 mL    OUT:  Total OUT: 0 mL    Total NET: 125 mL                                          10.7   9.29  )-----------( 223      ( 30 Apr 2019 18:10 )             33.8     04-30    138  |  99  |  29<H>  ----------------------------<  193<H>  4.3   |  22  |  1.0    Ca    8.6      30 Apr 2019 18:10  Phos  4.2     04-30  Mg     1.8     04-30        Large Dressing Change-> right labia and clitoris--> post debridement and abscess drainage

## 2019-05-02 LAB
-  AMIKACIN: SIGNIFICANT CHANGE UP
-  AMIKACIN: SIGNIFICANT CHANGE UP
-  AMPICILLIN/SULBACTAM: SIGNIFICANT CHANGE UP
-  AMPICILLIN/SULBACTAM: SIGNIFICANT CHANGE UP
-  AMPICILLIN: SIGNIFICANT CHANGE UP
-  AMPICILLIN: SIGNIFICANT CHANGE UP
-  AZTREONAM: SIGNIFICANT CHANGE UP
-  AZTREONAM: SIGNIFICANT CHANGE UP
-  CEFAZOLIN: SIGNIFICANT CHANGE UP
-  CEFAZOLIN: SIGNIFICANT CHANGE UP
-  CEFEPIME: SIGNIFICANT CHANGE UP
-  CEFEPIME: SIGNIFICANT CHANGE UP
-  CEFOXITIN: SIGNIFICANT CHANGE UP
-  CEFOXITIN: SIGNIFICANT CHANGE UP
-  CEFTRIAXONE: SIGNIFICANT CHANGE UP
-  CEFTRIAXONE: SIGNIFICANT CHANGE UP
-  CIPROFLOXACIN: SIGNIFICANT CHANGE UP
-  CIPROFLOXACIN: SIGNIFICANT CHANGE UP
-  ERTAPENEM: SIGNIFICANT CHANGE UP
-  ERTAPENEM: SIGNIFICANT CHANGE UP
-  GENTAMICIN: SIGNIFICANT CHANGE UP
-  GENTAMICIN: SIGNIFICANT CHANGE UP
-  IMIPENEM: SIGNIFICANT CHANGE UP
-  IMIPENEM: SIGNIFICANT CHANGE UP
-  LEVOFLOXACIN: SIGNIFICANT CHANGE UP
-  LEVOFLOXACIN: SIGNIFICANT CHANGE UP
-  MEROPENEM: SIGNIFICANT CHANGE UP
-  MEROPENEM: SIGNIFICANT CHANGE UP
-  PIPERACILLIN/TAZOBACTAM: SIGNIFICANT CHANGE UP
-  PIPERACILLIN/TAZOBACTAM: SIGNIFICANT CHANGE UP
-  TOBRAMYCIN: SIGNIFICANT CHANGE UP
-  TOBRAMYCIN: SIGNIFICANT CHANGE UP
-  TRIMETHOPRIM/SULFAMETHOXAZOLE: SIGNIFICANT CHANGE UP
-  TRIMETHOPRIM/SULFAMETHOXAZOLE: SIGNIFICANT CHANGE UP
ANION GAP SERPL CALC-SCNC: 13 MMOL/L — SIGNIFICANT CHANGE UP (ref 7–14)
BASOPHILS # BLD AUTO: 0.06 K/UL — SIGNIFICANT CHANGE UP (ref 0–0.2)
BASOPHILS NFR BLD AUTO: 0.5 % — SIGNIFICANT CHANGE UP (ref 0–1)
BUN SERPL-MCNC: 28 MG/DL — HIGH (ref 10–20)
CALCIUM SERPL-MCNC: 9.2 MG/DL — SIGNIFICANT CHANGE UP (ref 8.5–10.1)
CHLORIDE SERPL-SCNC: 101 MMOL/L — SIGNIFICANT CHANGE UP (ref 98–110)
CO2 SERPL-SCNC: 26 MMOL/L — SIGNIFICANT CHANGE UP (ref 17–32)
CREAT SERPL-MCNC: 1.1 MG/DL — SIGNIFICANT CHANGE UP (ref 0.7–1.5)
EOSINOPHIL # BLD AUTO: 0.58 K/UL — SIGNIFICANT CHANGE UP (ref 0–0.7)
EOSINOPHIL NFR BLD AUTO: 4.8 % — SIGNIFICANT CHANGE UP (ref 0–8)
GLUCOSE SERPL-MCNC: 132 MG/DL — HIGH (ref 70–99)
HCT VFR BLD CALC: 34.8 % — LOW (ref 37–47)
HGB BLD-MCNC: 11 G/DL — LOW (ref 12–16)
IMM GRANULOCYTES NFR BLD AUTO: 0.8 % — HIGH (ref 0.1–0.3)
LYMPHOCYTES # BLD AUTO: 1.79 K/UL — SIGNIFICANT CHANGE UP (ref 1.2–3.4)
LYMPHOCYTES # BLD AUTO: 15 % — LOW (ref 20.5–51.1)
MAGNESIUM SERPL-MCNC: 1.5 MG/DL — LOW (ref 1.8–2.4)
MCHC RBC-ENTMCNC: 29.7 PG — SIGNIFICANT CHANGE UP (ref 27–31)
MCHC RBC-ENTMCNC: 31.6 G/DL — LOW (ref 32–37)
MCV RBC AUTO: 94.1 FL — SIGNIFICANT CHANGE UP (ref 81–99)
METHOD TYPE: SIGNIFICANT CHANGE UP
METHOD TYPE: SIGNIFICANT CHANGE UP
MONOCYTES # BLD AUTO: 1.19 K/UL — HIGH (ref 0.1–0.6)
MONOCYTES NFR BLD AUTO: 9.9 % — HIGH (ref 1.7–9.3)
NEUTROPHILS # BLD AUTO: 8.25 K/UL — HIGH (ref 1.4–6.5)
NEUTROPHILS NFR BLD AUTO: 69 % — SIGNIFICANT CHANGE UP (ref 42.2–75.2)
NRBC # BLD: 0 /100 WBCS — SIGNIFICANT CHANGE UP (ref 0–0)
PHOSPHATE SERPL-MCNC: 2.9 MG/DL — SIGNIFICANT CHANGE UP (ref 2.1–4.9)
PLATELET # BLD AUTO: 261 K/UL — SIGNIFICANT CHANGE UP (ref 130–400)
POTASSIUM SERPL-MCNC: 4.8 MMOL/L — SIGNIFICANT CHANGE UP (ref 3.5–5)
POTASSIUM SERPL-SCNC: 4.8 MMOL/L — SIGNIFICANT CHANGE UP (ref 3.5–5)
RBC # BLD: 3.7 M/UL — LOW (ref 4.2–5.4)
RBC # FLD: 14.6 % — HIGH (ref 11.5–14.5)
SODIUM SERPL-SCNC: 140 MMOL/L — SIGNIFICANT CHANGE UP (ref 135–146)
SURGICAL PATHOLOGY STUDY: SIGNIFICANT CHANGE UP
WBC # BLD: 11.96 K/UL — HIGH (ref 4.8–10.8)
WBC # FLD AUTO: 11.96 K/UL — HIGH (ref 4.8–10.8)

## 2019-05-02 RX ORDER — MEROPENEM 1 G/30ML
500 INJECTION INTRAVENOUS EVERY 8 HOURS
Qty: 0 | Refills: 0 | Status: COMPLETED | OUTPATIENT
Start: 2019-05-02 | End: 2019-05-03

## 2019-05-02 RX ORDER — LINEZOLID 600 MG/300ML
600 INJECTION, SOLUTION INTRAVENOUS EVERY 12 HOURS
Qty: 0 | Refills: 0 | Status: COMPLETED | OUTPATIENT
Start: 2019-05-02 | End: 2019-05-03

## 2019-05-02 RX ADMIN — LINEZOLID 300 MILLIGRAM(S): 600 INJECTION, SOLUTION INTRAVENOUS at 19:53

## 2019-05-02 RX ADMIN — Medication 650 MILLIGRAM(S): at 22:29

## 2019-05-02 RX ADMIN — ESCITALOPRAM OXALATE 10 MILLIGRAM(S): 10 TABLET, FILM COATED ORAL at 11:30

## 2019-05-02 RX ADMIN — Medication 1 TABLET(S): at 11:30

## 2019-05-02 RX ADMIN — Medication 100 MILLIGRAM(S): at 19:41

## 2019-05-02 RX ADMIN — Medication 100 MILLIGRAM(S): at 05:04

## 2019-05-02 RX ADMIN — Medication 650 MILLIGRAM(S): at 07:28

## 2019-05-02 RX ADMIN — Medication 650 MILLIGRAM(S): at 12:00

## 2019-05-02 RX ADMIN — Medication 650 MILLIGRAM(S): at 11:30

## 2019-05-02 RX ADMIN — APIXABAN 2.5 MILLIGRAM(S): 2.5 TABLET, FILM COATED ORAL at 19:40

## 2019-05-02 RX ADMIN — MEROPENEM 100 MILLIGRAM(S): 1 INJECTION INTRAVENOUS at 19:39

## 2019-05-02 RX ADMIN — PANTOPRAZOLE SODIUM 40 MILLIGRAM(S): 20 TABLET, DELAYED RELEASE ORAL at 05:04

## 2019-05-02 RX ADMIN — LOSARTAN POTASSIUM 25 MILLIGRAM(S): 100 TABLET, FILM COATED ORAL at 05:04

## 2019-05-02 RX ADMIN — APIXABAN 2.5 MILLIGRAM(S): 2.5 TABLET, FILM COATED ORAL at 05:04

## 2019-05-02 RX ADMIN — CLOPIDOGREL BISULFATE 75 MILLIGRAM(S): 75 TABLET, FILM COATED ORAL at 11:30

## 2019-05-02 RX ADMIN — Medication 100 MILLIGRAM(S): at 05:07

## 2019-05-02 RX ADMIN — ATORVASTATIN CALCIUM 40 MILLIGRAM(S): 80 TABLET, FILM COATED ORAL at 22:29

## 2019-05-02 RX ADMIN — Medication 650 MILLIGRAM(S): at 05:05

## 2019-05-02 RX ADMIN — ATENOLOL 25 MILLIGRAM(S): 25 TABLET ORAL at 05:04

## 2019-05-02 RX ADMIN — Medication 650 MILLIGRAM(S): at 19:53

## 2019-05-02 NOTE — CONSULT NOTE ADULT - ASSESSMENT
IMPRESSION:  recurrent abscess right labia  s/p debridement with cultures pending    RECOMMENDATIONS:  F/u OR cultures  Zyvox 600 mg iv q12h  Meropenem 500 mg iv q8h  Diuresis

## 2019-05-02 NOTE — PHYSICAL THERAPY INITIAL EVALUATION ADULT - PERTINENT HX OF CURRENT PROBLEM, REHAB EVAL
pt had a cyst cleaned, drained and closed x 1 month ago. she noticed the top portion was red and came to hospital.

## 2019-05-02 NOTE — PROGRESS NOTE ADULT - ASSESSMENT
Abscess left labia - s/p I & D   continue wound packing changes; IV abx Abscess left labia - s/p I & D   continue wound packing changes; IV abx   Cxs- (+) MRSA. ID input re cont abx course

## 2019-05-02 NOTE — CONSULT NOTE ADULT - SUBJECTIVE AND OBJECTIVE BOX
BENITEZ FOX  89y, Female  Allergy: Levaquin (Unknown)  opioid-like analgesics (Unknown)  penicillin (Hives)  sulfa drugs (Hives)      HPI:  89 y/o F PMH Afib, stented coronary artery, cataract, BC p/w hard, red mass by R groin. Pt had a cyst cleaned, drained, and closed by Dr. Fermin x 1 month ago near R groin. Pt has been experiencing hematuria x 2 days and today noted that top portion of where the incision was made to drain cyst was hard and red. Contacted Dr. Fermin who then referred pt to ED. Pt describes feeling some discomfort in R groin when she sits; non radiating, achy, better with standing, worse with sitting, 4/10 at its worst. Denies fevers, headache, vision changes, weakness/numbness, confusion, neck pain, chest pain, back pain, dyspnea, cough, palpitations, nausea, vomiting, abdominal pain, diarrhea, constipation, blood in stool/dark stools, leg swelling, rash, recent travel or sick contacts. (28 Apr 2019 09:20)    30-Apr-2019 13:43:05 drainage abscess and excisional debridement right labia and clitoris.     FAMILY HISTORY:  No pertinent family history in first degree relatives    PAST MEDICAL & SURGICAL HISTORY:  Atrial fibrillation  Stented coronary artery  Cataract  Breast cancer  S/P lumpectomy, right breast  H/O total knee replacement, left        VITALS:  T(F): 98, Max: 98 (05-02-19 @ 04:42)  HR: 63  BP: 145/70  RR: 18Vital Signs Last 24 Hrs  T(C): 36.7 (02 May 2019 04:42), Max: 36.7 (02 May 2019 04:42)  T(F): 98 (02 May 2019 04:42), Max: 98 (02 May 2019 04:42)  HR: 63 (02 May 2019 06:05) (59 - 71)  BP: 145/70 (02 May 2019 06:05) (139/65 - 173/74)  BP(mean): --  RR: 18 (02 May 2019 04:42) (18 - 18)  SpO2: 96% (02 May 2019 06:05) (96% - 96%)    TESTS & MEASUREMENTS:                        10.7   9.29  )-----------( 223      ( 30 Apr 2019 18:10 )             33.8     04-30    138  |  99  |  29<H>  ----------------------------<  193<H>  4.3   |  22  |  1.0    Ca    8.6      30 Apr 2019 18:10  Phos  4.2     04-30  Mg     1.8     04-30          Culture - Surgical Swab (collected 04-30-19 @ 15:02)  Source: .Surgical Swab None  Preliminary Report (05-01-19 @ 22:01):    Few Gram Negative Rods    Few Routine vaginal kinsey    Culture - Other (collected 04-27-19 @ 16:16)  Source: .Other wound  Final Report (04-30-19 @ 17:28):    Moderate Klebsiella pneumoniae    Few Escherichia coli ESBL    Few Enterococcus species "Susceptibilities not performed"    Rare Methicillin resistant Staphylococcus aureus    Few Lactobacillus species "Susceptibilities not performed"  Organism: Klebsiella pneumoniae  Escherichia coli ESBL  Methicillin resistant Staphylococcus aureus (04-30-19 @ 17:28)  Organism: Methicillin resistant Staphylococcus aureus (04-30-19 @ 17:28)      -  Ampicillin/Sulbactam: R 16/8      -  Cefazolin: R >16      -  Clindamycin: R >4      -  Daptomycin: S 0.5      -  Erythromycin: R >4      -  Gentamicin: S <=1 Should not be used as monotherapy      -  Linezolid: S 2      -  Oxacillin: R >2      -  Penicillin: R >8      -  RIF- Rifampin: S <=1 Should not be used as monotherapy      -  Tetra/Doxy: S 2      -  Trimethoprim/Sulfamethoxazole: S <=0.5/9.5      -  Vancomycin: S 2      Method Type: ADY  Organism: Escherichia coli ESBL (04-30-19 @ 17:28)      -  Amikacin: S <=16      -  Ampicillin: R >16 These ampicillin results predict results for amoxicillin      -  Ampicillin/Sulbactam: R <=8/4      -  Aztreonam: R 16      -  Cefazolin: R >16      -  Cefepime: R >16      -  Cefoxitin: S <=8      -  Ceftriaxone: R >32 Enterobacter, Citrobacter, and Serratia may develop resistance during prolonged therapy      -  Ciprofloxacin: S <=1      -  Ertapenem: S <=1      -  Gentamicin: S <=4      -  Imipenem: S <=1      -  Levofloxacin: S <=2      -  Meropenem: S <=1      -  Piperacillin/Tazobactam: R <=16      -  Tobramycin: S <=4      -  Trimethoprim/Sulfamethoxazole: S <=2/38      Method Type: ADY  Organism: Klebsiella pneumoniae (04-30-19 @ 17:28)      -  Amikacin: S <=16      -  Ampicillin: R >16 These ampicillin results predict results for amoxicillin      -  Ampicillin/Sulbactam: S <=8/4      -  Aztreonam: S <=4      -  Cefazolin: S <=8      -  Cefepime: S <=4      -  Cefoxitin: S <=8      -  Ceftriaxone: S <=1 Enterobacter, Citrobacter, and Serratia may develop resistance during prolonged therapy      -  Ciprofloxacin: S <=1      -  Ertapenem: S <=1      -  Gentamicin: S <=4      -  Imipenem: S <=1      -  Levofloxacin: S <=2      -  Meropenem: S <=1      -  Piperacillin/Tazobactam: S <=16      -  Tobramycin: S <=4      -  Trimethoprim/Sulfamethoxazole: S <=2/38      Method Type: ADY            RADIOLOGY & ADDITIONAL TESTS:    ANTIBIOTICS:  clindamycin IVPB      clindamycin IVPB 600 milliGRAM(s) IV Intermittent every 8 hours

## 2019-05-02 NOTE — PROGRESS NOTE ADULT - SUBJECTIVE AND OBJECTIVE BOX
AM rounds     Pt: no complaints  No acute events o/n  Vital Signs Last 24 Hrs  T(C): 37.1 (02 May 2019 12:15), Max: 37.1 (02 May 2019 12:15)  T(F): 98.7 (02 May 2019 12:15), Max: 98.7 (02 May 2019 12:15)  HR: 67 (02 May 2019 12:15) (59 - 71)  BP: 169/74 (02 May 2019 12:15) (139/65 - 173/74)  BP(mean): --  RR: 18 (02 May 2019 12:15) (18 - 18)  SpO2: 96% (02 May 2019 06:05) (96% - 96%)        I&O's Summary      04-30    138  |  99  |  29<H>  ----------------------------<  193<H>  4.3   |  22  |  1.0    Ca    8.6      30 Apr 2019 18:10  Phos  4.2     04-30  Mg     1.8     04-30                            10.7   9.29  )-----------( 223      ( 30 Apr 2019 18:10 )             33.8     CAPILLARY BLOOD GLUCOSE            EXAM:     Wound AM rounds   POD # 2  Pt: no complaints. Daughter at bedside   No acute events o/n  Vital Signs Last 24 Hrs  T(C): 37.1 (02 May 2019 12:15), Max: 37.1 (02 May 2019 12:15)  T(F): 98.7 (02 May 2019 12:15), Max: 98.7 (02 May 2019 12:15)  HR: 67 (02 May 2019 12:15) (59 - 71)  BP: 169/74 (02 May 2019 12:15) (139/65 - 173/74)  BP(mean): --  RR: 18 (02 May 2019 12:15) (18 - 18)  SpO2: 96% (02 May 2019 06:05) (96% - 96%)        I&O's Summary      04-30    138  |  99  |  29<H>  ----------------------------<  193<H>  4.3   |  22  |  1.0    Ca    8.6      30 Apr 2019 18:10  Phos  4.2     04-30  Mg     1.8     04-30                            10.7   9.29  )-----------( 223      ( 30 Apr 2019 18:10 )             33.8     EXAM:   Pt awake alert     Wound - right labia - decreased labial edema and induration   open wound tracking sup and lat - small amount of thick drainage expressed ; discolored yellow wound edges    wound irrigated - packing changed

## 2019-05-02 NOTE — PHYSICAL THERAPY INITIAL EVALUATION ADULT - LIVES WITH, PROFILE
children/daughter in a one family private home with  no steps to enter. Pt stays on the first floor.

## 2019-05-03 ENCOUNTER — TRANSCRIPTION ENCOUNTER (OUTPATIENT)
Age: 84
End: 2019-05-03

## 2019-05-03 RX ORDER — MAGNESIUM SULFATE 500 MG/ML
2 VIAL (ML) INJECTION ONCE
Qty: 0 | Refills: 0 | Status: COMPLETED | OUTPATIENT
Start: 2019-05-03 | End: 2019-05-03

## 2019-05-03 RX ORDER — MAGNESIUM SULFATE 500 MG/ML
2 VIAL (ML) INJECTION ONCE
Qty: 0 | Refills: 0 | Status: COMPLETED | OUTPATIENT
Start: 2019-05-03 | End: 2019-05-04

## 2019-05-03 RX ADMIN — CLOPIDOGREL BISULFATE 75 MILLIGRAM(S): 75 TABLET, FILM COATED ORAL at 12:16

## 2019-05-03 RX ADMIN — Medication 100 MILLIGRAM(S): at 05:10

## 2019-05-03 RX ADMIN — Medication 650 MILLIGRAM(S): at 12:47

## 2019-05-03 RX ADMIN — Medication 650 MILLIGRAM(S): at 23:01

## 2019-05-03 RX ADMIN — Medication 650 MILLIGRAM(S): at 18:54

## 2019-05-03 RX ADMIN — Medication 50 GRAM(S): at 09:02

## 2019-05-03 RX ADMIN — APIXABAN 2.5 MILLIGRAM(S): 2.5 TABLET, FILM COATED ORAL at 05:09

## 2019-05-03 RX ADMIN — ATENOLOL 25 MILLIGRAM(S): 25 TABLET ORAL at 05:09

## 2019-05-03 RX ADMIN — Medication 650 MILLIGRAM(S): at 12:17

## 2019-05-03 RX ADMIN — PANTOPRAZOLE SODIUM 40 MILLIGRAM(S): 20 TABLET, DELAYED RELEASE ORAL at 08:06

## 2019-05-03 RX ADMIN — Medication 100 MILLIGRAM(S): at 18:55

## 2019-05-03 RX ADMIN — APIXABAN 2.5 MILLIGRAM(S): 2.5 TABLET, FILM COATED ORAL at 18:55

## 2019-05-03 RX ADMIN — LOSARTAN POTASSIUM 25 MILLIGRAM(S): 100 TABLET, FILM COATED ORAL at 05:09

## 2019-05-03 RX ADMIN — LINEZOLID 300 MILLIGRAM(S): 600 INJECTION, SOLUTION INTRAVENOUS at 05:08

## 2019-05-03 RX ADMIN — Medication 1 TABLET(S): at 12:16

## 2019-05-03 RX ADMIN — ATORVASTATIN CALCIUM 40 MILLIGRAM(S): 80 TABLET, FILM COATED ORAL at 22:25

## 2019-05-03 RX ADMIN — ESCITALOPRAM OXALATE 10 MILLIGRAM(S): 10 TABLET, FILM COATED ORAL at 12:16

## 2019-05-03 RX ADMIN — MEROPENEM 100 MILLIGRAM(S): 1 INJECTION INTRAVENOUS at 08:07

## 2019-05-03 NOTE — PROGRESS NOTE ADULT - ASSESSMENT
Abscess left labia - s/p I & D   Wound cx - polymicrobial - continue wound packing changes;    PO Zyvox per ID   Discharge planned for tomorrow

## 2019-05-03 NOTE — DIETITIAN INITIAL EVALUATION ADULT. - SOURCE
Good appetite & po intake PTP. NKFA. No oral supplements. MVI at home. Regular diet. No known history of unintentional wt loss.

## 2019-05-03 NOTE — PROGRESS NOTE ADULT - ASSESSMENT
· Assessment		  IMPRESSION:  recurrent abscess right labia  s/p debridement with unremarkable post op course    RECOMMENDATIONS:  po Zyvox 600 mg q12h for 6 more days  Recall prn please

## 2019-05-03 NOTE — DIETITIAN INITIAL EVALUATION ADULT. - OTHER INFO
Reason for RD assessment: LOS assessment. Pertinent Medical Information: p/w labial cyst. Abscess left labia - s/p I & D.

## 2019-05-03 NOTE — DISCHARGE NOTE PROVIDER - CARE PROVIDER_API CALL
Junior Fermin)  Plastic Surgery  [address] [place] [address]  Phone: [phone]  Fax: [phone]  Follow Up Time:     Malia Osman)  Plastic Surgery  [address] [place] [address]  Phone: [phone]  Fax: [phone]  Follow Up Time:

## 2019-05-03 NOTE — PROGRESS NOTE ADULT - SUBJECTIVE AND OBJECTIVE BOX
BENITEZ FOX  89y, Female      OVERNIGHT EVENTS:    no fevers, feels well  no pain at surgical site  minimal SOB, no cough  no  complaints    VITALS:  T(F): 98.7, Max: 98.8 (05-02-19 @ 21:32)  HR: 77  BP: 168/78  RR: 18Vital Signs Last 24 Hrs  T(C): 37.1 (03 May 2019 05:36), Max: 37.1 (02 May 2019 12:15)  T(F): 98.7 (03 May 2019 05:36), Max: 98.8 (02 May 2019 21:32)  HR: 77 (03 May 2019 05:36) (67 - 85)  BP: 168/78 (03 May 2019 06:10) (168/78 - 189/82)  BP(mean): --  RR: 18 (03 May 2019 05:36) (18 - 18)  SpO2: --    TESTS & MEASUREMENTS:                        11.0   11.96 )-----------( 261      ( 02 May 2019 18:42 )             34.8     05-02    140  |  101  |  28<H>  ----------------------------<  132<H>  4.8   |  26  |  1.1    Ca    9.2      02 May 2019 18:42  Phos  2.9     05-02  Mg     1.5     05-02          Culture - Surgical Swab (collected 04-30-19 @ 15:02)  Source: .Surgical Swab None  Preliminary Report (05-02-19 @ 18:16):    Few Escherichia coli ESBL    Rare Klebsiella pneumoniae    Few Routine vaginal kinsey  Organism: Escherichia coli ESBL  Klebsiella pneumoniae (05-02-19 @ 18:16)  Organism: Klebsiella pneumoniae (05-02-19 @ 18:16)      -  Amikacin: S <=16      -  Ampicillin: R >16 These ampicillin results predict results for amoxicillin      -  Ampicillin/Sulbactam: S <=8/4      -  Aztreonam: S <=4      -  Cefazolin: S <=8      -  Cefepime: S <=4      -  Cefoxitin: S <=8      -  Ceftriaxone: S <=1 Enterobacter, Citrobacter, and Serratia may develop resistance during prolonged therapy      -  Ciprofloxacin: S <=1      -  Ertapenem: S <=1      -  Gentamicin: S <=4      -  Imipenem: S <=1      -  Levofloxacin: S <=2      -  Meropenem: S <=1      -  Piperacillin/Tazobactam: S <=16      -  Tobramycin: S <=4      -  Trimethoprim/Sulfamethoxazole: S <=2/38      Method Type: ADY  Organism: Escherichia coli ESBL (05-02-19 @ 18:16)      -  Amikacin: S <=16      -  Ampicillin: R >16 These ampicillin results predict results for amoxicillin      -  Ampicillin/Sulbactam: R <=8/4      -  Aztreonam: R >16      -  Cefazolin: R >16      -  Cefepime: R >16      -  Cefoxitin: S <=8      -  Ceftriaxone: R >32 Enterobacter, Citrobacter, and Serratia may develop resistance during prolonged therapy      -  Ciprofloxacin: S <=1      -  Ertapenem: S <=1      -  Gentamicin: S <=4      -  Imipenem: S <=1      -  Levofloxacin: S <=2      -  Meropenem: S <=1      -  Piperacillin/Tazobactam: R <=16      -  Tobramycin: S <=4      -  Trimethoprim/Sulfamethoxazole: S <=2/38      Method Type: ADY    Culture - Other (collected 04-27-19 @ 16:16)  Source: .Other wound  Final Report (04-30-19 @ 17:28):    Moderate Klebsiella pneumoniae    Few Escherichia coli ESBL    Few Enterococcus species "Susceptibilities not performed"    Rare Methicillin resistant Staphylococcus aureus    Few Lactobacillus species "Susceptibilities not performed"  Organism: Klebsiella pneumoniae  Escherichia coli ESBL  Methicillin resistant Staphylococcus aureus (04-30-19 @ 17:28)  Organism: Methicillin resistant Staphylococcus aureus (04-30-19 @ 17:28)      -  Ampicillin/Sulbactam: R 16/8      -  Cefazolin: R >16      -  Clindamycin: R >4      -  Daptomycin: S 0.5      -  Erythromycin: R >4      -  Gentamicin: S <=1 Should not be used as monotherapy      -  Linezolid: S 2      -  Oxacillin: R >2      -  Penicillin: R >8      -  RIF- Rifampin: S <=1 Should not be used as monotherapy      -  Tetra/Doxy: S 2      -  Trimethoprim/Sulfamethoxazole: S <=0.5/9.5      -  Vancomycin: S 2      Method Type: ADY  Organism: Escherichia coli ESBL (04-30-19 @ 17:28)      -  Amikacin: S <=16      -  Ampicillin: R >16 These ampicillin results predict results for amoxicillin      -  Ampicillin/Sulbactam: R <=8/4      -  Aztreonam: R 16      -  Cefazolin: R >16      -  Cefepime: R >16      -  Cefoxitin: S <=8      -  Ceftriaxone: R >32 Enterobacter, Citrobacter, and Serratia may develop resistance during prolonged therapy      -  Ciprofloxacin: S <=1      -  Ertapenem: S <=1      -  Gentamicin: S <=4      -  Imipenem: S <=1      -  Levofloxacin: S <=2      -  Meropenem: S <=1      -  Piperacillin/Tazobactam: R <=16      -  Tobramycin: S <=4      -  Trimethoprim/Sulfamethoxazole: S <=2/38      Method Type: ADY  Organism: Klebsiella pneumoniae (04-30-19 @ 17:28)      -  Amikacin: S <=16      -  Ampicillin: R >16 These ampicillin results predict results for amoxicillin      -  Ampicillin/Sulbactam: S <=8/4      -  Aztreonam: S <=4      -  Cefazolin: S <=8      -  Cefepime: S <=4      -  Cefoxitin: S <=8      -  Ceftriaxone: S <=1 Enterobacter, Citrobacter, and Serratia may develop resistance during prolonged therapy      -  Ciprofloxacin: S <=1      -  Ertapenem: S <=1      -  Gentamicin: S <=4      -  Imipenem: S <=1      -  Levofloxacin: S <=2      -  Meropenem: S <=1      -  Piperacillin/Tazobactam: S <=16      -  Tobramycin: S <=4      -  Trimethoprim/Sulfamethoxazole: S <=2/38      Method Type: ADY            RADIOLOGY & ADDITIONAL TESTS:    ANTIBIOTICS:

## 2019-05-03 NOTE — DIETITIAN INITIAL EVALUATION ADULT. - PHYSICAL APPEARANCE
BMI: 29.0. Alert & oriented. Last BM 5/3. No chewing/swallowing difficulty reported. Skin: R labia wound.

## 2019-05-03 NOTE — DISCHARGE NOTE PROVIDER - NSFOLLOWUPCLINICS_GEN_ALL_ED_FT
[facility]  Burn  [address], [address]  [place] [address]  Phone: [phone]  Fax:   Follow Up Time:

## 2019-05-03 NOTE — DIETITIAN INITIAL EVALUATION ADULT. - ENERGY NEEDS
Energy: 8471-8524 kcal/day (MSJx1.2-1.3 AF)    Protein: 56-69 g/day (0.8-1 g/kg ABW)    Fluids: 1 ml/kcal

## 2019-05-03 NOTE — PROGRESS NOTE ADULT - SUBJECTIVE AND OBJECTIVE BOX
AM rounds     Pt: no complaints  No acute events o/n  Vital Signs Last 24 Hrs  T(C): 37.1 (03 May 2019 05:36), Max: 37.1 (02 May 2019 21:32)  T(F): 98.7 (03 May 2019 05:36), Max: 98.8 (02 May 2019 21:32)  HR: 77 (03 May 2019 05:36) (77 - 85)  BP: 168/78 (03 May 2019 06:10) (168/78 - 189/82)  BP(mean): --  RR: 18 (03 May 2019 05:36) (18 - 18)      I&O's Summary      05-02    140  |  101  |  28<H>  ----------------------------<  132<H>  4.8   |  26  |  1.1    Ca    9.2      02 May 2019 18:42  Phos  2.9     05-02  Mg     1.5     05-0                          11.0   11.96 )-----------( 261      ( 02 May 2019 18:42 )             34      CXS - wd - enterococcus E coli, MRSA, Klebsiella     EXAM:     Wound - right labia - yellow wound edge -   small amount of thick serosang fluid expressed     wound irrigated and repacked

## 2019-05-03 NOTE — DISCHARGE NOTE PROVIDER - NSDCCPCAREPLAN_GEN_ALL_CORE_FT
PRINCIPAL DISCHARGE DIAGNOSIS  Diagnosis: Post-operative infection  Assessment and Plan of Treatment:       SECONDARY DISCHARGE DIAGNOSES  Diagnosis: Leukocytosis  Assessment and Plan of Treatment: PRINCIPAL DISCHARGE DIAGNOSIS  Diagnosis: Post-operative infection  Assessment and Plan of Treatment: Ok to discharge home with VNS for wound care  -follow up in burn clinic in 1 week for outpatient management . Burn Clinic located at [address] [place] [address] [address] [place] [address]. Please call [phone] to schedule an appointment.   -c/w oral antibiotics for 5 more days as prescribed.   -c/w local wound care once daily as prescribed, moistened gauze with normal saline packed into vaginal wound twice daily.         SECONDARY DISCHARGE DIAGNOSES  Diagnosis: Leukocytosis  Assessment and Plan of Treatment: Resolution of leukocytosis.

## 2019-05-03 NOTE — PROGRESS NOTE ADULT - ATTENDING COMMENTS
Continue care an discharge plan  discussed with pt.  Concerns addressed
Continue care discussed with pt and daughter. Concerns addressed

## 2019-05-03 NOTE — DISCHARGE NOTE PROVIDER - CARE PROVIDERS DIRECT ADDRESSES
[email or web address]

## 2019-05-03 NOTE — DISCHARGE NOTE NURSING/CASE MANAGEMENT/SOCIAL WORK - NSDCDPATPORTLINK_GEN_ALL_CORE
You can access the [facility] Patient Portal, offered by [facility], by registering with the following website: [email or web address]

## 2019-05-03 NOTE — DISCHARGE NOTE PROVIDER - HOSPITAL COURSE
Areli Mendez is a 89 yo female with PMH significant for Afib, stented coronary artery disease, cataracts and breast cancer who was admitted     ~1mo ago for drainage of cyst and debridement of right groin/labia which was subsequently closed. Patient presents to the ED with increased     redness, pain and hardening at the site of previous surgery. Patient also endorsed a 2 day history of hematuria. Patient is admitted, started on     IV fluids and taken to the OR for I&D of right labia on 4/30. The area is being packed with wet-to-dry dressings, ABD and tape. Patient Areli Mendez is a 89 yo female with PMH significant for Afib, stented coronary artery disease, cataracts and breast cancer who was admitted     ~1mo ago for drainage of cyst and debridement of right groin/labia which was subsequently closed. Patient presents to the ED with increased     redness, pain and hardening at the site of previous surgery. Patient also endorsed a 2 day history of hematuria. Patient is admitted, started on     IV fluids and taken to the OR for I&D of right labia on 4/30. The area is being packed with wet-to-dry dressings, ABD and tape. Patient now appropriate for discharge home and will follow up with burn surgery for outpatient management within 1 week.

## 2019-05-04 VITALS
SYSTOLIC BLOOD PRESSURE: 137 MMHG | RESPIRATION RATE: 18 BRPM | HEART RATE: 59 BPM | DIASTOLIC BLOOD PRESSURE: 64 MMHG | TEMPERATURE: 99 F

## 2019-05-04 RX ORDER — LINEZOLID 600 MG/300ML
1 INJECTION, SOLUTION INTRAVENOUS
Qty: 10 | Refills: 0 | OUTPATIENT
Start: 2019-05-04 | End: 2019-05-08

## 2019-05-04 RX ADMIN — CLOPIDOGREL BISULFATE 75 MILLIGRAM(S): 75 TABLET, FILM COATED ORAL at 11:18

## 2019-05-04 RX ADMIN — LOSARTAN POTASSIUM 25 MILLIGRAM(S): 100 TABLET, FILM COATED ORAL at 05:40

## 2019-05-04 RX ADMIN — Medication 1 TABLET(S): at 11:18

## 2019-05-04 RX ADMIN — PANTOPRAZOLE SODIUM 40 MILLIGRAM(S): 20 TABLET, DELAYED RELEASE ORAL at 05:40

## 2019-05-04 RX ADMIN — Medication 100 MILLIGRAM(S): at 05:40

## 2019-05-04 RX ADMIN — ATENOLOL 25 MILLIGRAM(S): 25 TABLET ORAL at 05:40

## 2019-05-04 RX ADMIN — APIXABAN 2.5 MILLIGRAM(S): 2.5 TABLET, FILM COATED ORAL at 05:40

## 2019-05-04 RX ADMIN — Medication 650 MILLIGRAM(S): at 00:05

## 2019-05-04 RX ADMIN — Medication 650 MILLIGRAM(S): at 05:40

## 2019-05-04 RX ADMIN — Medication 50 GRAM(S): at 00:38

## 2019-05-04 RX ADMIN — Medication 650 MILLIGRAM(S): at 11:18

## 2019-05-04 RX ADMIN — ESCITALOPRAM OXALATE 10 MILLIGRAM(S): 10 TABLET, FILM COATED ORAL at 11:18

## 2019-05-04 NOTE — PROGRESS NOTE ADULT - SUBJECTIVE AND OBJECTIVE BOX
Patient is a 89y old  Female who presents with a chief complaint of labial cyst (03 May 2019 13:17)    No acute events overnight    Vital Signs Last 24 Hrs  T(C): 37 (04 May 2019 13:11), Max: 37.4 (03 May 2019 14:48)  T(F): 98.6 (04 May 2019 13:11), Max: 99.3 (03 May 2019 14:48)  HR: 59 (04 May 2019 13:11) (59 - 96)  BP: 137/64 (04 May 2019 13:11) (135/67 - 176/77)  BP(mean): --  RR: 18 (04 May 2019 13:11) (18 - 18)  SpO2: 94% (04 May 2019 11:23) (90% - 95%)  I&O's Summary      Meds:  MEDICATIONS  (STANDING):  acetaminophen   Tablet .. 650 milliGRAM(s) Oral every 6 hours  apixaban 2.5 milliGRAM(s) Oral every 12 hours  ATENolol  Tablet 25 milliGRAM(s) Oral daily  atorvastatin 40 milliGRAM(s) Oral at bedtime  clopidogrel Tablet 75 milliGRAM(s) Oral daily  docusate sodium 100 milliGRAM(s) Oral two times a day  escitalopram 10 milliGRAM(s) Oral daily  losartan 25 milliGRAM(s) Oral daily  multivitamin 1 Tablet(s) Oral daily  pantoprazole    Tablet 40 milliGRAM(s) Oral before breakfast    MEDICATIONS  (PRN):  acetaminophen   Tablet .. 650 milliGRAM(s) Oral every 6 hours PRN Temp greater or equal to 38C (100.4F), Mild Pain (1 - 3)  BACItracin   Ointment 1 Application(s) Topical two times a day PRN wound care      Labs:                        11.0   11.96 )-----------( 261      ( 02 May 2019 18:42 )             34.8     05-02    140  |  101  |  28<H>  ----------------------------<  132<H>  4.8   |  26  |  1.1    Ca    9.2      02 May 2019 18:42  Phos  2.9     05-02  Mg     1.5     05-02          PE: AAO x 3  Full thickness wound to right labia with granulation tissue  serosang dc

## 2019-05-04 NOTE — PROGRESS NOTE ADULT - ASSESSMENT
A/P: s/p I & D Right labia    cont wound care  Cont antibx  DVT GI Prophylaxis  Pain control  OT/PT  d/c home

## 2019-05-05 LAB
CULTURE RESULTS: SIGNIFICANT CHANGE UP
ORGANISM # SPEC MICROSCOPIC CNT: SIGNIFICANT CHANGE UP
SPECIMEN SOURCE: SIGNIFICANT CHANGE UP

## 2019-05-09 ENCOUNTER — APPOINTMENT (OUTPATIENT)
Dept: BURN CARE | Facility: CLINIC | Age: 84
End: 2019-05-09

## 2019-05-09 ENCOUNTER — OUTPATIENT (OUTPATIENT)
Dept: OUTPATIENT SERVICES | Facility: HOSPITAL | Age: 84
LOS: 1 days | Discharge: HOME | End: 2019-05-09

## 2019-05-09 DIAGNOSIS — Z98.890 OTHER SPECIFIED POSTPROCEDURAL STATES: Chronic | ICD-10-CM

## 2019-05-09 DIAGNOSIS — Z96.652 PRESENCE OF LEFT ARTIFICIAL KNEE JOINT: Chronic | ICD-10-CM

## 2019-05-09 DIAGNOSIS — L02.214 CUTANEOUS ABSCESS OF GROIN: ICD-10-CM

## 2019-05-09 DIAGNOSIS — N76.4 ABSCESS OF VULVA: ICD-10-CM

## 2019-05-09 NOTE — REASON FOR VISIT
[Revisit] : revisit [Were you seen in the Emergency Room?] : seen in the emergency room [Family Member] : family member [Were you admitted to the burn center at [facility]?] : not admitted to the burn center at [facility]

## 2019-05-09 NOTE — PHYSICAL EXAM
[Healing] : healing [Infected?] : Infected: No [Size%: ______] : Size: [unfilled]% [3] : 3 out of 10 [Abnormal] : abnormal [Small] : small  [] : no [de-identified] : right groin and labia--> healing--> local wound care

## 2019-05-09 NOTE — HISTORY OF PRESENT ILLNESS
[Did this injury occur on the job?] : Did this injury occur on the job? No [Did you have an operation on your burn/wound injury?] : Did you have an operation on your burn/wound injury? Yes [de-identified] : wound healing [de-identified] : labia abscess post debridement and closure--> new abscess post drainage

## 2019-05-13 DIAGNOSIS — Z85.3 PERSONAL HISTORY OF MALIGNANT NEOPLASM OF BREAST: ICD-10-CM

## 2019-05-13 DIAGNOSIS — R09.02 HYPOXEMIA: ICD-10-CM

## 2019-05-13 DIAGNOSIS — N76.4 ABSCESS OF VULVA: ICD-10-CM

## 2019-05-13 DIAGNOSIS — I48.91 UNSPECIFIED ATRIAL FIBRILLATION: ICD-10-CM

## 2019-05-13 DIAGNOSIS — B95.62 METHICILLIN RESISTANT STAPHYLOCOCCUS AUREUS INFECTION AS THE CAUSE OF DISEASES CLASSIFIED ELSEWHERE: ICD-10-CM

## 2019-05-13 DIAGNOSIS — L02.214 CUTANEOUS ABSCESS OF GROIN: ICD-10-CM

## 2019-05-16 ENCOUNTER — APPOINTMENT (OUTPATIENT)
Dept: BURN CARE | Facility: CLINIC | Age: 84
End: 2019-05-16

## 2019-05-16 ENCOUNTER — OUTPATIENT (OUTPATIENT)
Dept: OUTPATIENT SERVICES | Facility: HOSPITAL | Age: 84
LOS: 1 days | Discharge: HOME | End: 2019-05-16

## 2019-05-16 DIAGNOSIS — Z96.652 PRESENCE OF LEFT ARTIFICIAL KNEE JOINT: Chronic | ICD-10-CM

## 2019-05-16 DIAGNOSIS — L02.214 CUTANEOUS ABSCESS OF GROIN: ICD-10-CM

## 2019-05-16 DIAGNOSIS — Z98.890 OTHER SPECIFIED POSTPROCEDURAL STATES: Chronic | ICD-10-CM

## 2019-05-16 NOTE — HISTORY OF PRESENT ILLNESS
[Did you have an operation on your burn/wound injury?] : Did you have an operation on your burn/wound injury? Yes [Did this injury occur on the job?] : Did this injury occur on the job? No [de-identified] : wound healing [de-identified] : labia abscess post debridement and closure--> new abscess post drainage

## 2019-05-16 NOTE — REASON FOR VISIT
[Revisit] : revisit [Were you seen in the Emergency Room?] : seen in the emergency room [Were you admitted to the burn center at [facility]?] : not admitted to the burn center at [facility] [Family Member] : family member

## 2019-05-16 NOTE — PHYSICAL EXAM
[Healing] : healing [Size%: ______] : Size: [unfilled]% [Infected?] : Infected: No [3] : 3 out of 10 [Abnormal] : abnormal [] : yes [Small] : small  [de-identified] : right groin and labia--> healing--> local wound care [de-identified] : isabellaell

## 2019-05-30 ENCOUNTER — OUTPATIENT (OUTPATIENT)
Dept: OUTPATIENT SERVICES | Facility: HOSPITAL | Age: 84
LOS: 1 days | Discharge: HOME | End: 2019-05-30

## 2019-05-30 ENCOUNTER — APPOINTMENT (OUTPATIENT)
Dept: BURN CARE | Facility: CLINIC | Age: 84
End: 2019-05-30

## 2019-05-30 DIAGNOSIS — Z98.890 OTHER SPECIFIED POSTPROCEDURAL STATES: Chronic | ICD-10-CM

## 2019-05-30 DIAGNOSIS — Z96.652 PRESENCE OF LEFT ARTIFICIAL KNEE JOINT: Chronic | ICD-10-CM

## 2019-05-30 NOTE — PHYSICAL EXAM
[Healing] : healing [Size%: ______] : Size: [unfilled]% [Infected?] : Infected: No [3] : 3 out of 10 [Abnormal] : abnormal [Small] : small  [] : no [de-identified] : isabellaell [de-identified] : right groin and labia - pink granulation superficial wound ; no tunneling noted; no drainage expressed ;

## 2019-05-30 NOTE — HISTORY OF PRESENT ILLNESS
[Did you have an operation on your burn/wound injury?] : Did you have an operation on your burn/wound injury? Yes [Did this injury occur on the job?] : Did this injury occur on the job? No [de-identified] : labia abscess post debridement and closure--> new abscess post drainage [de-identified] : Continuing Aquacel Ag packing,. No specific concerns

## 2019-05-30 NOTE — REASON FOR VISIT
[Revisit] : revisit [Were you seen in the Emergency Room?] : seen in the emergency room [Were you admitted to the burn center at [facility]?] : not admitted to the burn center at [facility] [Family Member] : family member
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
## 2019-06-13 ENCOUNTER — OUTPATIENT (OUTPATIENT)
Dept: OUTPATIENT SERVICES | Facility: HOSPITAL | Age: 84
LOS: 1 days | Discharge: HOME | End: 2019-06-13

## 2019-06-13 ENCOUNTER — APPOINTMENT (OUTPATIENT)
Dept: BURN CARE | Facility: CLINIC | Age: 84
End: 2019-06-13
Payer: MEDICARE

## 2019-06-13 DIAGNOSIS — Z98.890 OTHER SPECIFIED POSTPROCEDURAL STATES: Chronic | ICD-10-CM

## 2019-06-13 DIAGNOSIS — Z96.652 PRESENCE OF LEFT ARTIFICIAL KNEE JOINT: Chronic | ICD-10-CM

## 2019-06-13 DIAGNOSIS — N76.4 ABSCESS OF VULVA: ICD-10-CM

## 2019-06-13 PROCEDURE — 99213 OFFICE O/P EST LOW 20 MIN: CPT
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
## 2019-06-13 NOTE — PHYSICAL EXAM
[Closed] : closed [Size%: ______] : Size: [unfilled]% [Infected?] : Infected: No [Abnormal] : abnormal [3] : 3 out of 10 [None] : none [de-identified] : isabellaell [] : no [de-identified] : right labia--. healed--> prn
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
## 2019-09-18 ENCOUNTER — TRANSCRIPTION ENCOUNTER (OUTPATIENT)
Age: 84
End: 2019-09-18

## 2020-09-11 NOTE — PRE-ANESTHESIA EVALUATION ADULT - NSANTHBPHIGHRD_ENT_A_CORE
11:59 AM    ZACK contacted Pura with [facility] to check on status of 142. Pura stated that application was sent to [facility] on 9/11/2020. That office has not submitted a decision.     Pura advised ZACK to con't to f/u with her offices for decision 142.     1:30 PM    ZACK contacted [facility] to determine if pt would be able to return. Facility does not accept COVID positive pts.     2:00 PM    ZACK hard faxed referral to [facility]  in addition to sending multiple in/pt psych referral.     Awaiting facility decision.             Sarah Norris LMSW  Case Management Social Worker   [facility] [address]    
No
No

## 2020-09-28 NOTE — ANESTHESIA FOLLOW-UP NOTE - NSEVALATION_GEN_ALL_CORE
FAMILY UPDATED VIA BRITTON THROUGHOUT PROCEDURE  
No apparent complications or complaints regarding anesthesia care at this time

## 2021-06-03 ENCOUNTER — OUTPATIENT (OUTPATIENT)
Dept: OUTPATIENT SERVICES | Facility: HOSPITAL | Age: 86
LOS: 1 days | Discharge: HOME | End: 2021-06-03
Payer: MEDICARE

## 2021-06-03 DIAGNOSIS — Z96.652 PRESENCE OF LEFT ARTIFICIAL KNEE JOINT: Chronic | ICD-10-CM

## 2021-06-03 DIAGNOSIS — Z98.890 OTHER SPECIFIED POSTPROCEDURAL STATES: Chronic | ICD-10-CM

## 2021-06-03 DIAGNOSIS — Z12.31 ENCOUNTER FOR SCREENING MAMMOGRAM FOR MALIGNANT NEOPLASM OF BREAST: ICD-10-CM

## 2021-06-03 PROCEDURE — 77067 SCR MAMMO BI INCL CAD: CPT | Mod: 26

## 2021-06-03 PROCEDURE — 77063 BREAST TOMOSYNTHESIS BI: CPT | Mod: 26

## 2022-01-03 NOTE — PROCEDURE NOTE - NSASSISTBY_GEN_A_CORE
Resident 93 Except BLE AROM Knee Flex both limited to 100 degreed due to chronic B Knee OA Pain; LLE Ankle DF limited to neutral due to heel pain/bilateral upper extremity Active ROM was WFL (within functional limits)/bilateral  lower extremity Active ROM was WFL (within functional limits)

## 2022-07-09 NOTE — DISCHARGE NOTE PROVIDER - NSDCCPCAREPLAN_GEN_ALL_CORE_FT
PRINCIPAL DISCHARGE DIAGNOSIS  Diagnosis: Labial abscess  Assessment and Plan of Treatment:       SECONDARY DISCHARGE DIAGNOSES  Diagnosis: Leukocytosis  Assessment and Plan of Treatment: Home

## 2022-10-13 ENCOUNTER — OUTPATIENT (OUTPATIENT)
Dept: OUTPATIENT SERVICES | Facility: HOSPITAL | Age: 87
LOS: 1 days | Discharge: HOME | End: 2022-10-13

## 2022-10-13 DIAGNOSIS — Z98.890 OTHER SPECIFIED POSTPROCEDURAL STATES: Chronic | ICD-10-CM

## 2022-10-13 DIAGNOSIS — Z12.31 ENCOUNTER FOR SCREENING MAMMOGRAM FOR MALIGNANT NEOPLASM OF BREAST: ICD-10-CM

## 2022-10-13 DIAGNOSIS — Z96.652 PRESENCE OF LEFT ARTIFICIAL KNEE JOINT: Chronic | ICD-10-CM

## 2022-10-13 PROCEDURE — 77063 BREAST TOMOSYNTHESIS BI: CPT | Mod: 26

## 2022-10-13 PROCEDURE — 77067 SCR MAMMO BI INCL CAD: CPT | Mod: 26

## 2023-01-10 ENCOUNTER — APPOINTMENT (OUTPATIENT)
Dept: ORTHOPEDIC SURGERY | Facility: CLINIC | Age: 88
End: 2023-01-10
Payer: MEDICARE

## 2023-01-10 DIAGNOSIS — Z96.652 PRESENCE OF LEFT ARTIFICIAL KNEE JOINT: ICD-10-CM

## 2023-01-10 DIAGNOSIS — M19.019 PRIMARY OSTEOARTHRITIS, UNSPECIFIED SHOULDER: ICD-10-CM

## 2023-01-10 DIAGNOSIS — M17.12 UNILATERAL PRIMARY OSTEOARTHRITIS, LEFT KNEE: ICD-10-CM

## 2023-01-10 PROCEDURE — 73030 X-RAY EXAM OF SHOULDER: CPT | Mod: RT

## 2023-01-10 NOTE — IMAGING
[de-identified] :  pleasant some distress frail using a walker neck limited motion right shoulder crepitus limited motion\par \par X-rays right shoulder advanced glenohumeral arthritis\par \par Both knees reasonable motion healed incision on the left crepitus swelling on the right

## 2023-01-10 NOTE — ASSESSMENT
[FreeTextEntry1] :  we agreed to try pain management for RFA procedure for the  right knee if she can bear little more weight it may help some of the shoulder pain heating pad Tylenol Lidoderm patches appropriate daughter will call after the RFA is been performed

## 2023-01-10 NOTE — REASON FOR VISIT
[Family Member] : family member [FreeTextEntry2] : Patient is coming in today for right leg and right shoulder. patient daughter said she has no feeling in right leg and right shoulder and is in a lot of pain.  using a rolling walker history left knee replacement is on Eliquis   cortisone injections to the knee have not been helpful right hand dominant feels a lot of crunching in the shoulder  last office visit and discussed radiofrequency ablation for the knee never pursued it

## 2023-02-15 ENCOUNTER — APPOINTMENT (OUTPATIENT)
Dept: PAIN MANAGEMENT | Facility: CLINIC | Age: 88
End: 2023-02-15
Payer: MEDICARE

## 2023-02-15 VITALS — BODY MASS INDEX: 26.34 KG/M2 | WEIGHT: 160 LBS | HEIGHT: 65.25 IN

## 2023-02-15 PROCEDURE — 99204 OFFICE O/P NEW MOD 45 MIN: CPT | Mod: 25

## 2023-02-15 PROCEDURE — 96136 PSYCL/NRPSYC TST PHY/QHP 1ST: CPT | Mod: 59

## 2023-02-16 NOTE — HISTORY OF PRESENT ILLNESS
[FreeTextEntry1] : HISTORY OF PRESENT ILLNESS: Ms. Mendez is a 89 year old female complaining of right knee pain. She is being referred by Dr. Adame who is seeing her for her right knee pain. She has undergone cortisone shots in the past which did not provide her with relief. She is not a surgical candidate. \par \par She is presenting today with ongoing severe right knee pain. She states the pain is worse with bending it or any sort of rotational movements. She states the pain is worse with walking or standing or putting pressure over the right knee. She rates her pain at a 10/10 on the pain scale. \par \par The pain started after no inciting event.  The patient has had this pain for 5 years, with pain worsening over the past year.  Patient describes the pain as severe.  During the last month the pain has been constant with symptoms worsening no typical pattern. Pain described as shooting, sharp, pressure-like, throbbing.  Pain is not changed with lying down, standing, sitting, walking, exercising, relaxing.  Bowel or bladder habits have not changed.\par  \par ACTIVITIES: Patient could walk less than a blocks before the pain starts.  Patient can sit many hours before pain starts.  Patient can stand 10 minutes before pain starts.  The patient 10 lies down because of pain.  Patient uses a walker at this time.  Patient has difficulty going to work, performing household chores, doing outside work or shopping, socializing with friends, participating in recreational activities and exercising at this time.\par \par PRIOR PAIN TREATMENTS:  No relief with physical therapy, heat treatment or cold treatment.\par \par Prior Pain Medications:  Tylenol, Lexapro.\par

## 2023-02-16 NOTE — PHYSICAL EXAM
[de-identified] : RIGHT KNEE - tenderness over the right knee joint. ROM restricted. Antalgic gait, using a walker.

## 2023-02-16 NOTE — DATA REVIEWED
[FreeTextEntry1] : SOAPP: Scored a 0 , low risk.\par  \par [place] REGISTRY: Reviewed .  \par  \par UDS: No data obtained today. \par   \par Medications that trigger a UDS: Benzodiazepines (Ativan, Xanax, Valium) etc, Barbiturates, Narcotics (Avinza, Butrans, hydrocodone, Codeine, Hanna, Methadone, Morphine, MS Contin, Opana, oxycodone, Oxycontin, Suboxone etc), Pregabalin (Lyrica), Tramadol (Ultacet, Utram etc), Tapentadol, (Nucynta) and Elist Drugs (cocaine, THC, Etc.)\par  \par Risk factors: Bipolar Illness, positive for any an illicit drugs, history of any ETOH and drug abuse, any signs of diversion, Sharing Meds, selling meds. Non consistent [place] drug reporting and above 120meq of morphine\par  \par Low risk: Patient has combination of a low risk SOAP and no risk factors. UDS would be repeated randomly every quarter

## 2023-02-16 NOTE — ASSESSMENT
[FreeTextEntry1] : 89 year old female presenting with severe right knee osteoarthritis. She has trialed and failed physical therapy, conservative treatment along with cortisone injections. I will now proceed with a right knee genicular nerve block under sedation. Follow up in 6 weeks will be made for reassessment. I have explained the findings to the patient and all questions have been answered. \par \par I am planning a right superior medial genicular, superior-lateral genicular and inferior medial geniculate nerve injection under fluoroscopy guidance to diagnosis knee pain, knee osteoarthritis and pain from knee replacement to decide on further treatment and possibly RFA.\par \par Risk, benefits, pros and cons of procedure were explained to the patient using models and diagrams and their questions were answered. \par \par \par The patient has severe anxiety of procedures that necessitates monitored anesthesia care (MAC). The procedure performed will be close to major nerves, arteries, and spinal cord and/or joint structures. Due to the proximity of these structures, we need the patient to be still during the procedure.  With the help of MAC, this will be safely achieved and decrease the risk of any complications.\par  \par Neuropsychological SOAPP and PCS testing was performed as an evaluation of cognition, mood, personality, behavior to assess likelihood of addiction, misuse, other aberrant medication-related behaviors, and different thoughts and feelings that may be associated with pain. The total time spent rendering and interpreting the service was approximately 20 minutes. Results will be implemented in the appropriate care of the patient\par \par Entered by Destinee Loyola, acting as scribe for Dr. Hernandez.\par  \par The documentation recorded by the scribe, in my presence, accurately reflects the service I personally performed, and the decisions made by me with my edits as appropriate.\par  \par Best Regards, \par Lucas Hernandez MD \par Board Certified, Anesthesiology \par Board Certified, Pain Medicine\par \par

## 2023-02-22 ENCOUNTER — APPOINTMENT (OUTPATIENT)
Dept: PAIN MANAGEMENT | Facility: CLINIC | Age: 88
End: 2023-02-22
Payer: MEDICARE

## 2023-02-22 PROCEDURE — 73560 X-RAY EXAM OF KNEE 1 OR 2: CPT

## 2023-02-22 PROCEDURE — 93770 DETERMINATION VENOUS PRESS: CPT

## 2023-02-22 PROCEDURE — 99152Z: CUSTOM

## 2023-02-22 PROCEDURE — 64454 NJX AA&/STRD GNCLR NRV BRNCH: CPT | Mod: RT

## 2023-02-22 PROCEDURE — 93040 RHYTHM ECG WITH REPORT: CPT | Mod: 59

## 2023-02-22 NOTE — PROCEDURE
[FreeTextEntry1] : Right Knee SUPERIOR MEDIAL GENICULAR, SUPERIOR LATERAL GENICULAR AND INFERIOR MEDIAL GENICULAR NERVE INJECTION UNDER FLURO GUIDANCE\par  [FreeTextEntry3] : Right Knee SUPERIOR MEDIAL GENICULAR, SUPERIOR LATERAL GENICULAR AND INFERIOR MEDIAL GENICULAR NERVE INJECTION UNDER FLURO GUIDANCE\par \par \par \par \par Date:  2023\par \par Patient: Areli Mendez\par \par : 10/21/1927\par \par \par Pre-op Diagnosis: Knee pain, knee osteoarthritis, status post knee replacement or knee arthropathy.\par Post-op Diagnosis: Same\par Procedure: Diagnostic right Superior medial genicular, superior lateral genicular and inferior medial genicular nerve injection with fluro guidance of the right knee. \par Physician: Lucas Hernandez M.D.\par  Anesthesia: MAC. IV Sedation Versed 0.5mg. \par Medical necessity: Failure of conservative medical management\par \par \par Indication for Fluoroscopy:  This procedure requires the precise placement of the spinal needle.  It is the only way to accurately and safely perform the injection.\par \par \par \par \par CONSENT: The possible complications including infection, bleeding, nerve damage, hospital admission, death or failure of the procedure, though unusual, are theoretically possible. The patient was educated about the of the procedure and alternative therapies. All questions were answered and the patient freely gave consent to proceed.\par Monitoring:  Patient had continuous blood pressure, EKG, and pulse oximetry throughout the case.\par  \par PROCEDURE NOTE:  \par After obtaining written consent, the patient was placed in a supine position on the fluoroscopy table. The patient's knee was then placed on pillows as tolerated to offset a clear lateral view of each leg. The skin of the target knee was prepped with a chloraprep solution and draped. A time out was performed. On the affected knee the superior medial and lateral epicondyle of the femur as well as the distal aspect of the medial tibial epicondyle was identified as the target zone for the injection using an AP view. Each of these regions was marked and the skin and subcutaneous tissue of these regions were infiltrated with 3 mLs of Lidocaine 1%. The needles were advanced using fluoroscopic guidance until reaching the mid level of the femur and tibia confirmed with a lateral view.  Again, after negative aspiration for air or heme, 2 mL of 2% lidocaine local was injected at each of the 3 sites. Needle was removed intact. A band aid was place on the site.\par \par  \par Findings: AP and lateral views of the knee with x-ray show degenerative changes \par  \par Complications: None. The patient tolerated the procedure well.\par \par \par \par \par Disposition: I have examined the patient and there are no new physical findings since the original presentation.  Sensory and motor function were intact. The patient met discharge criteria; see nurses' notes. The discharge instruction sheet was reviewed and given to the patient. The patient was discharged home with a .\par \par \par \par \par \par Comment: If 70% relief greater than 2 hours or 50% greater than 24 hours would proceed to RFA. If 50% less than 24 hours, would repeat to confirm for RFA. Follow in office. Call if any problems.\par \par \par \par \par Indication for RFA: The patient had a positive response to the genicular injections and is considered a good candidate for the thermal RF ablation procedure. The diagnostic injection provided at least 75% reduction in pain for 1 hour or 50% relief for 24 hours and the following criteria have been met. Failed response physical therapy, steroid injection, hyaline supplementation injection, not surgical candidate or does not what to proceed with surgery.\par \par \par \par \par \par This document was signed by: \par Lucas Hernandez MD  \par Board Certified, Anesthesiology  \par Board Certified, Pain Medicine  \par \par

## 2023-03-09 ENCOUNTER — APPOINTMENT (OUTPATIENT)
Dept: PAIN MANAGEMENT | Facility: CLINIC | Age: 88
End: 2023-03-09
Payer: MEDICARE

## 2023-03-09 PROCEDURE — 73560 X-RAY EXAM OF KNEE 1 OR 2: CPT | Mod: RT

## 2023-03-09 PROCEDURE — 99152Z: CUSTOM

## 2023-03-09 PROCEDURE — 93770 DETERMINATION VENOUS PRESS: CPT | Mod: 59

## 2023-03-09 PROCEDURE — 93040 RHYTHM ECG WITH REPORT: CPT | Mod: 59

## 2023-03-09 PROCEDURE — 64624 DSTRJ NULYT AGT GNCLR NRV: CPT | Mod: RT

## 2023-03-09 NOTE — PROCEDURE
[FreeTextEntry1] : RADIOFREQUENCY ABLATION OF THE SUPERIOR MEDIAL GENICULAR, SUPERIOR LATERAL GENICULAR AND INFERIOR MEDIAL GENICULAR NERVE INJECTION UNDER FLURO GUIDANCE\par  [FreeTextEntry3] : RADIOFREQUENCY ABLATION OF THE SUPERIOR MEDIAL GENICULAR, SUPERIOR LATERAL GENICULAR AND INFERIOR MEDIAL GENICULAR NERVE INJECTION UNDER FLURO GUIDANCE\par \par \par \par \par Date:  2023\par \par Patient: Areli Mendez\par \par :  10/21/1927\par \par \par Pre-op Diagnosis: Knee pain, knee osteoarthritis, status post knee replacement or knee arthropathy.\par Post-op Diagnosis: Same\par Procedure: Radiofrequency ablation of the Superior medial genicular, superior lateral genicular and inferior medial genicular nerve injection with fluro guidance of the right knee. \par Anesthesia: See Nurses note. MAC IV Sedation versed .5mg.\par Physician: Lucas Hernandez MD\par Medical necessity: Failure of conservative medical management\par Indication for Fluoroscopy:  This procedure requires the precise placement of the spinal needle.  It is the only way to accurately and safely perform the injection.\par CONSENT: The possible complications including infection, bleeding, nerve damage, hospital admission, death or failure of the procedure, though unusual, are theoretically possible. The patient was educated about the of the procedure and alternative therapies. All questions were answered and the patient freely gave consent to proceed.\par Monitoring:  Patient had continuous blood pressure, EKG, and pulse oximetry throughout the case. See nurse's notes.\par \par \par \par \par Procedure Note: \par After obtaining written consent, the patient was placed in a supine position. The patient's knee was then placed on pillows as tolerated to offset a clear lateral view of each leg. The skin of the target knee was prepped with a Betadine solution and draped. A time out was performed.  \par The radiofrequency ablation 100 mm disposable Ariel*-coated cannula with a 5 mm active tip was adjusted via the sizing collar so that the electrode fit just inside the inner bevel at the end of the bare metal. Prior to beginning the procedure, the internal test mode function of the radiofrequency unit was checked to make sure the machine was functioning properly. On the affected knee the superior medial and lateral epicondyle of the femur as well as the distal aspect of the medial tibial epicondyle was identified as the target zone for the injection using an AP view. Each of these regions was marked and the skin and subcutaneous tissue of these regions were infiltrated with several mLs of local anesthesia prior to inserting a radiofrequency ablation  100 mm disposable Ariel*-coated cannula with a 5 mm active tip. The needles were advanced using fluoroscopic guidance until reaching the mid level of the femur and tibia confirmed with a lateral view. Sensory stimulation at 50 hertz was performed at each target area. Referral of the sensory stimulation was noted at less than 1 volt over the paravertebral area or hip. Motor dissociation at 2 hertz was obtained by stimulating up to 3 times the voltage of the sensory stimulation and insuring a lack of motor movement in the lower extremities. Once the radiofrequency cannula was in correct position, a 2% Lidocaine with 10mg of depomedrol solution was used to rapidly anesthetize the lesion site. After a thirty second delay, thermal lesions were then created at each level for 60 seconds at a temperature of 80¿ C. After the lesions were created, the cannulas were removed intact and sterile bandages placed at the puncture sites. A band aid was place on the site.\par \par \par \par \par Complications: None. The patient tolerated the procedure well.\par \par Disposition: I have examined the patient and there are no new physical findings since the original presentation.  Sensory and motor function were intact. The patient met discharge criteria see nurses' notes. The discharge instruction sheet was reviewed and given to the patient. The patient was discharged home with a .  \par \par \par Comment: Patient had a successful diagnostic genicular injections. RFA today, follow up in the office in 2-4 weeks. Call if any problems.\par \par Indication for RFA: The patient had a positive response to the genicular injections and is considered a good candidate for the thermal RF ablation procedure. The diagnostic injection provided at least 70% reduction in pain for 1 hour or 50% relief for 24 hours and the following criteria have been met. Failed response physical therapy, steroid injection, hyaline supplementation injection, not surgical candidate or does not what to proceed with surgery.\par \par \par \par \par This document was signed by:\par Lucas Hernandez MD  \par Board Certified, Anesthesiology  \par Board Certified, Pain Medicine  \par \par \par \par

## 2023-04-06 ENCOUNTER — APPOINTMENT (OUTPATIENT)
Dept: PAIN MANAGEMENT | Facility: CLINIC | Age: 88
End: 2023-04-06
Payer: MEDICARE

## 2023-04-06 DIAGNOSIS — M17.11 UNILATERAL PRIMARY OSTEOARTHRITIS, RIGHT KNEE: ICD-10-CM

## 2023-04-06 PROCEDURE — 99213 OFFICE O/P EST LOW 20 MIN: CPT

## 2023-04-06 NOTE — ASSESSMENT
[FreeTextEntry1] : 89 year old female presenting with severe right knee osteoarthritis. She is s/p right knee genicular nerve RFA with excellent relief. She will continue with OTC anti-inflammatories for acute exacerbations of pain. She will follow up PRN.\par \par I personally reviewed with the PA, this patient's history and physical exam findings, as documented above. I have discussed the relevant areas of concern, having direct implications to the presenting problems and illnesses, and I have personally examined all pertinent and positive and negative findings, which impact on the prior pain management treatment.\par \par Juventino Talbot PA-C\par Lucas Hernandez MD\par \par

## 2023-04-06 NOTE — PHYSICAL EXAM
[de-identified] : RIGHT KNEE - tenderness over the right knee joint. ROM restricted. Antalgic gait, using a walker.

## 2023-04-06 NOTE — HISTORY OF PRESENT ILLNESS
[FreeTextEntry1] : HISTORY OF PRESENT ILLNESS: Ms. Fox is a 89 year old female complaining of right knee pain. She is being referred by Dr. Adame who is seeing her for her right knee pain. She has undergone cortisone shots in the past which did not provide her with relief. She is not a surgical candidate. \par \par She is presenting today with ongoing severe right knee pain. She states the pain is worse with bending it or any sort of rotational movements. She states the pain is worse with walking or standing or putting pressure over the right knee. She rates her pain at a 10/10 on the pain scale. \par \par The pain started after no inciting event.  The patient has had this pain for 5 years, with pain worsening over the past year.  Patient describes the pain as severe.  During the last month the pain has been constant with symptoms worsening no typical pattern. Pain described as shooting, sharp, pressure-like, throbbing.  Pain is not changed with lying down, standing, sitting, walking, exercising, relaxing.  Bowel or bladder habits have not changed.\par  \par ACTIVITIES: Patient could walk less than a blocks before the pain starts.  Patient can sit many hours before pain starts.  Patient can stand 10 minutes before pain starts.  The patient 10 lies down because of pain.  Patient uses a walker at this time.  Patient has difficulty going to work, performing household chores, doing outside work or shopping, socializing with friends, participating in recreational activities and exercising at this time.\par \par PRIOR PAIN TREATMENTS:  No relief with physical therapy, heat treatment or cold treatment.\par \par Prior Pain Medications:  Tylenol, Lexapro.\par \par Today: I had the pleasure of seeing BENITEZ FOX in the office today. Previous history and physical exam findings have been reviewed. \par \par She is currently under our care for right knee pain which she is receiving active care for. She is s/p Right knee genicular nerve RFA with excellent relief. She reports over 75% pain relief from this procedure. She uses a walker to ambulate. She notices that she has been able to ambulate with the walker for longer periods of time before feeling pain. She is very pleased with the results of the injection and would like to repeat the procedure if her pain returns. For breakthrough pain, she utilizes OTC anti-inflammatories such as Tylenol and Advil. I cautioned prolonged use of these medications in her age group and the risks of consuming these medications without meals.\par \par

## 2023-09-13 ENCOUNTER — EMERGENCY (EMERGENCY)
Facility: HOSPITAL | Age: 88
LOS: 0 days | Discharge: HOME | End: 2023-09-14
Attending: EMERGENCY MEDICINE
Payer: MEDICARE

## 2023-09-13 VITALS
WEIGHT: 154.98 LBS | DIASTOLIC BLOOD PRESSURE: 74 MMHG | HEART RATE: 68 BPM | SYSTOLIC BLOOD PRESSURE: 158 MMHG | OXYGEN SATURATION: 96 % | TEMPERATURE: 98 F | RESPIRATION RATE: 18 BRPM
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
## 2023-09-13 DIAGNOSIS — S09.90XA UNSPECIFIED INJURY OF HEAD, INITIAL ENCOUNTER: ICD-10-CM

## 2023-09-13 DIAGNOSIS — Z79.01 LONG TERM (CURRENT) USE OF ANTICOAGULANTS: ICD-10-CM

## 2023-09-13 DIAGNOSIS — M25.531 PAIN IN RIGHT WRIST: ICD-10-CM

## 2023-09-13 DIAGNOSIS — I25.10 ATHEROSCLEROTIC HEART DISEASE OF NATIVE CORONARY ARTERY WITHOUT ANGINA PECTORIS: ICD-10-CM

## 2023-09-13 DIAGNOSIS — Z88.1 ALLERGY STATUS TO OTHER ANTIBIOTIC AGENTS STATUS: ICD-10-CM

## 2023-09-13 DIAGNOSIS — S01.01XA LACERATION WITHOUT FOREIGN BODY OF SCALP, INITIAL ENCOUNTER: ICD-10-CM

## 2023-09-13 DIAGNOSIS — Z96.652 PRESENCE OF LEFT ARTIFICIAL KNEE JOINT: ICD-10-CM

## 2023-09-13 DIAGNOSIS — Z96.652 PRESENCE OF LEFT ARTIFICIAL KNEE JOINT: Chronic | ICD-10-CM

## 2023-09-13 DIAGNOSIS — Z98.890 OTHER SPECIFIED POSTPROCEDURAL STATES: Chronic | ICD-10-CM

## 2023-09-13 DIAGNOSIS — Z88.0 ALLERGY STATUS TO PENICILLIN: ICD-10-CM

## 2023-09-13 DIAGNOSIS — S60.221A CONTUSION OF RIGHT HAND, INITIAL ENCOUNTER: ICD-10-CM

## 2023-09-13 DIAGNOSIS — Z90.11 ACQUIRED ABSENCE OF RIGHT BREAST AND NIPPLE: ICD-10-CM

## 2023-09-13 DIAGNOSIS — I48.91 UNSPECIFIED ATRIAL FIBRILLATION: ICD-10-CM

## 2023-09-13 DIAGNOSIS — Z88.6 ALLERGY STATUS TO ANALGESIC AGENT: ICD-10-CM

## 2023-09-13 DIAGNOSIS — M79.641 PAIN IN RIGHT HAND: ICD-10-CM

## 2023-09-13 DIAGNOSIS — Z79.02 LONG TERM (CURRENT) USE OF ANTITHROMBOTICS/ANTIPLATELETS: ICD-10-CM

## 2023-09-13 DIAGNOSIS — Z88.2 ALLERGY STATUS TO SULFONAMIDES: ICD-10-CM

## 2023-09-13 DIAGNOSIS — Z23 ENCOUNTER FOR IMMUNIZATION: ICD-10-CM
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
## 2023-09-13 DIAGNOSIS — Z86.69 PERSONAL HISTORY OF OTHER DISEASES OF THE NERVOUS SYSTEM AND SENSE ORGANS: ICD-10-CM

## 2023-09-13 DIAGNOSIS — Z85.3 PERSONAL HISTORY OF MALIGNANT NEOPLASM OF BREAST: ICD-10-CM
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
## 2023-09-13 DIAGNOSIS — Z95.5 PRESENCE OF CORONARY ANGIOPLASTY IMPLANT AND GRAFT: ICD-10-CM

## 2023-09-13 LAB
ALBUMIN SERPL ELPH-MCNC: 3.8 G/DL — SIGNIFICANT CHANGE UP (ref 3.5–5.2)
ALP SERPL-CCNC: 74 U/L — SIGNIFICANT CHANGE UP (ref 30–115)
ALT FLD-CCNC: 16 U/L — SIGNIFICANT CHANGE UP (ref 0–41)
ANION GAP SERPL CALC-SCNC: 10 MMOL/L — SIGNIFICANT CHANGE UP (ref 7–14)
APTT BLD: 40.1 SEC — HIGH (ref 27–39.2)
AST SERPL-CCNC: 27 U/L — SIGNIFICANT CHANGE UP (ref 0–41)
BASOPHILS # BLD AUTO: 0.07 K/UL — SIGNIFICANT CHANGE UP (ref 0–0.2)
BASOPHILS NFR BLD AUTO: 0.7 % — SIGNIFICANT CHANGE UP (ref 0–1)
BILIRUB SERPL-MCNC: 0.4 MG/DL — SIGNIFICANT CHANGE UP (ref 0.2–1.2)
BUN SERPL-MCNC: 37 MG/DL — HIGH (ref 10–20)
CALCIUM SERPL-MCNC: 9.5 MG/DL — SIGNIFICANT CHANGE UP (ref 8.4–10.5)
CHLORIDE SERPL-SCNC: 100 MMOL/L — SIGNIFICANT CHANGE UP (ref 98–110)
CO2 SERPL-SCNC: 27 MMOL/L — SIGNIFICANT CHANGE UP (ref 17–32)
CREAT SERPL-MCNC: 1.3 MG/DL — SIGNIFICANT CHANGE UP (ref 0.7–1.5)
EGFR: 38 ML/MIN/1.73M2 — LOW
EOSINOPHIL # BLD AUTO: 0.31 K/UL — SIGNIFICANT CHANGE UP (ref 0–0.7)
EOSINOPHIL NFR BLD AUTO: 2.9 % — SIGNIFICANT CHANGE UP (ref 0–8)
GLUCOSE SERPL-MCNC: 108 MG/DL — HIGH (ref 70–99)
HCT VFR BLD CALC: 42.7 % — SIGNIFICANT CHANGE UP (ref 37–47)
HGB BLD-MCNC: 13.8 G/DL — SIGNIFICANT CHANGE UP (ref 12–16)
IMM GRANULOCYTES NFR BLD AUTO: 0.5 % — HIGH (ref 0.1–0.3)
INR BLD: 1.16 RATIO — SIGNIFICANT CHANGE UP (ref 0.65–1.3)
LACTATE SERPL-SCNC: 1.3 MMOL/L — SIGNIFICANT CHANGE UP (ref 0.7–2)
LIDOCAIN IGE QN: 22 U/L — SIGNIFICANT CHANGE UP (ref 7–60)
LYMPHOCYTES # BLD AUTO: 1.96 K/UL — SIGNIFICANT CHANGE UP (ref 1.2–3.4)
LYMPHOCYTES # BLD AUTO: 18.3 % — LOW (ref 20.5–51.1)
MCHC RBC-ENTMCNC: 31.8 PG — HIGH (ref 27–31)
MCHC RBC-ENTMCNC: 32.3 G/DL — SIGNIFICANT CHANGE UP (ref 32–37)
MCV RBC AUTO: 98.4 FL — SIGNIFICANT CHANGE UP (ref 81–99)
MONOCYTES # BLD AUTO: 1.16 K/UL — HIGH (ref 0.1–0.6)
MONOCYTES NFR BLD AUTO: 10.9 % — HIGH (ref 1.7–9.3)
NEUTROPHILS # BLD AUTO: 7.14 K/UL — HIGH (ref 1.4–6.5)
NEUTROPHILS NFR BLD AUTO: 66.7 % — SIGNIFICANT CHANGE UP (ref 42.2–75.2)
NRBC # BLD: 0 /100 WBCS — SIGNIFICANT CHANGE UP (ref 0–0)
PLATELET # BLD AUTO: 202 K/UL — SIGNIFICANT CHANGE UP (ref 130–400)
PMV BLD: 9.8 FL — SIGNIFICANT CHANGE UP (ref 7.4–10.4)
POTASSIUM SERPL-MCNC: 4.3 MMOL/L — SIGNIFICANT CHANGE UP (ref 3.5–5)
POTASSIUM SERPL-SCNC: 4.3 MMOL/L — SIGNIFICANT CHANGE UP (ref 3.5–5)
PROT SERPL-MCNC: 6.4 G/DL — SIGNIFICANT CHANGE UP (ref 6–8)
PROTHROM AB SERPL-ACNC: 13.3 SEC — HIGH (ref 9.95–12.87)
RBC # BLD: 4.34 M/UL — SIGNIFICANT CHANGE UP (ref 4.2–5.4)
RBC # FLD: 14.4 % — SIGNIFICANT CHANGE UP (ref 11.5–14.5)
SODIUM SERPL-SCNC: 137 MMOL/L — SIGNIFICANT CHANGE UP (ref 135–146)
WBC # BLD: 10.69 K/UL — SIGNIFICANT CHANGE UP (ref 4.8–10.8)
WBC # FLD AUTO: 10.69 K/UL — SIGNIFICANT CHANGE UP (ref 4.8–10.8)

## 2023-09-13 PROCEDURE — 74177 CT ABD & PELVIS W/CONTRAST: CPT | Mod: MA

## 2023-09-13 PROCEDURE — 71260 CT THORAX DX C+: CPT | Mod: 26,MA

## 2023-09-13 PROCEDURE — 73120 X-RAY EXAM OF HAND: CPT | Mod: 26,RT

## 2023-09-13 PROCEDURE — 72170 X-RAY EXAM OF PELVIS: CPT

## 2023-09-13 PROCEDURE — 96374 THER/PROPH/DIAG INJ IV PUSH: CPT | Mod: XU

## 2023-09-13 PROCEDURE — 90471 IMMUNIZATION ADMIN: CPT

## 2023-09-13 PROCEDURE — 85025 COMPLETE CBC W/AUTO DIFF WBC: CPT

## 2023-09-13 PROCEDURE — 99284 EMERGENCY DEPT VISIT MOD MDM: CPT

## 2023-09-13 PROCEDURE — 72170 X-RAY EXAM OF PELVIS: CPT | Mod: 26

## 2023-09-13 PROCEDURE — 99291 CRITICAL CARE FIRST HOUR: CPT | Mod: 25

## 2023-09-13 PROCEDURE — 72125 CT NECK SPINE W/O DYE: CPT | Mod: MA

## 2023-09-13 PROCEDURE — 80053 COMPREHEN METABOLIC PANEL: CPT

## 2023-09-13 PROCEDURE — 70450 CT HEAD/BRAIN W/O DYE: CPT | Mod: MA

## 2023-09-13 PROCEDURE — 72125 CT NECK SPINE W/O DYE: CPT | Mod: 26,MA

## 2023-09-13 PROCEDURE — 71045 X-RAY EXAM CHEST 1 VIEW: CPT | Mod: 26

## 2023-09-13 PROCEDURE — 73110 X-RAY EXAM OF WRIST: CPT | Mod: RT

## 2023-09-13 PROCEDURE — 73110 X-RAY EXAM OF WRIST: CPT | Mod: 26,RT

## 2023-09-13 PROCEDURE — 71045 X-RAY EXAM CHEST 1 VIEW: CPT

## 2023-09-13 PROCEDURE — 74177 CT ABD & PELVIS W/CONTRAST: CPT | Mod: 26,MA

## 2023-09-13 PROCEDURE — 36415 COLL VENOUS BLD VENIPUNCTURE: CPT

## 2023-09-13 PROCEDURE — 99285 EMERGENCY DEPT VISIT HI MDM: CPT | Mod: 25

## 2023-09-13 PROCEDURE — 85610 PROTHROMBIN TIME: CPT

## 2023-09-13 PROCEDURE — 71260 CT THORAX DX C+: CPT | Mod: MA

## 2023-09-13 PROCEDURE — 90715 TDAP VACCINE 7 YRS/> IM: CPT

## 2023-09-13 PROCEDURE — 83690 ASSAY OF LIPASE: CPT

## 2023-09-13 PROCEDURE — 70450 CT HEAD/BRAIN W/O DYE: CPT | Mod: 26,MA

## 2023-09-13 PROCEDURE — 85730 THROMBOPLASTIN TIME PARTIAL: CPT

## 2023-09-13 PROCEDURE — 73120 X-RAY EXAM OF HAND: CPT | Mod: RT

## 2023-09-13 PROCEDURE — 83605 ASSAY OF LACTIC ACID: CPT

## 2023-09-13 PROCEDURE — 29125 APPL SHORT ARM SPLINT STATIC: CPT | Mod: RT

## 2023-09-13 RX ORDER — ONDANSETRON 8 MG/1
4 TABLET, FILM COATED ORAL ONCE
Refills: 0 | Status: COMPLETED | OUTPATIENT
Start: 2023-09-13 | End: 2023-09-13

## 2023-09-13 RX ORDER — ACETAMINOPHEN 500 MG
975 TABLET ORAL ONCE
Refills: 0 | Status: DISCONTINUED | OUTPATIENT
Start: 2023-09-13 | End: 2023-09-14

## 2023-09-13 RX ORDER — TETANUS TOXOID, REDUCED DIPHTHERIA TOXOID AND ACELLULAR PERTUSSIS VACCINE, ADSORBED 5; 2.5; 8; 8; 2.5 [IU]/.5ML; [IU]/.5ML; UG/.5ML; UG/.5ML; UG/.5ML
0.5 SUSPENSION INTRAMUSCULAR ONCE
Refills: 0 | Status: COMPLETED | OUTPATIENT
Start: 2023-09-13 | End: 2023-09-13

## 2023-09-13 RX ADMIN — TETANUS TOXOID, REDUCED DIPHTHERIA TOXOID AND ACELLULAR PERTUSSIS VACCINE, ADSORBED 0.5 MILLILITER(S): 5; 2.5; 8; 8; 2.5 SUSPENSION INTRAMUSCULAR at 22:02

## 2023-09-13 RX ADMIN — ONDANSETRON 4 MILLIGRAM(S): 8 TABLET, FILM COATED ORAL at 22:02

## 2023-09-13 NOTE — CONSULT NOTE ADULT - SUBJECTIVE AND OBJECTIVE BOX
TRAUMA ACTIVATION LEVEL:  CODE / ALERT  / CONSULT  ACTIVATED BY: EMS**  /  ED**  INTUBATED: YES** / NO**    MECHANISM OF INJURY:   [] Blunt     [] MVC	  [] Fall	  [] Pedestrian Struck	  [] Motorcycle     [] Assault     [] Bicycle collision    [] Sports injury    [] Penetrating    [] Gun Shot Wound      [] Stab Wound    GCS: 15 	E: 4	V: 5	M: 6    HPI:    89F w/ a pmh significant for afib (on eliquis), CAD s/p stents, cataracts, and breast cancer in remission who presents s/p fall. As per family, pt was walking to the living room, when she had a mechanical fall and fell, +HT +AC -LOC. Pt complaining R hand/wrist pain.     Trauma assessment in ED: ABCs intact , GCS 15 , AAOx3.    Obvious external signs of injury: Small posterior scalp hematoma    PAST MEDICAL & SURGICAL HISTORY:  Breast cancer      Cataract      Stented coronary artery      Atrial fibrillation      H/O total knee replacement, left      S/P lumpectomy, right breast        Allergies    sulfa drugs (Hives)  opioid-like analgesics (Unknown)  Levaquin (Unknown)  penicillin (Hives)    Intolerances      Home Medications:  atenolol 25 mg oral tablet: 1 tab(s) orally once a day (18 Mar 2019 13:49)  atorvastatin 40 mg oral tablet: 1 tab(s) orally once a day (18 Mar 2019 13:49)  candesartan 16 mg oral tablet: 1 tab(s) orally once a day (18 Mar 2019 13:49)  chlorthalidone 25 mg oral tablet: 1 tab(s) orally once a day (18 Mar 2019 13:49)  Eliquis 2.5 mg oral tablet: 1 tab(s) orally 2 times a day (18 Mar 2019 13:49)  Lexapro 10 mg oral tablet: 1 tab(s) orally once a day (18 Mar 2019 13:49)  losartan 50 mg oral tablet: 1 tab(s) orally once a day (29 Mar 2019 06:37)  Multiple Vitamins oral tablet: 1 tab(s) orally once a day (04 May 2019 15:13)  Plavix 75 mg oral tablet: 1 tab(s) orally once a day (18 Mar 2019 13:49)      ROS: 10-system review is otherwise negative except HPI above.      Primary Survey:    A - airway intact  B - bilateral breath sounds and good chest rise  C - palpable pulses in all extremities  D - GCS 15 on arrival, WOLFF  Exposure obtained    Vital Signs Last 24 Hrs  T(C): 36.8 (13 Sep 2023 20:20), Max: 36.8 (13 Sep 2023 20:20)  T(F): 98.2 (13 Sep 2023 20:20), Max: 98.2 (13 Sep 2023 20:20)  HR: 68 (13 Sep 2023 20:20) (68 - 68)  BP: 158/74 (13 Sep 2023 20:20) (158/74 - 158/74)  BP(mean): --  RR: 18 (13 Sep 2023 20:20) (18 - 18)  SpO2: 96% (13 Sep 2023 20:20) (96% - 96%)    Parameters below as of 13 Sep 2023 20:20  Patient On (Oxygen Delivery Method): nasal cannula  O2 Flow (L/min): 4      Secondary Survey:   General: NAD  HEENT: Normocephalic, small posterior scalp hematoma, EOMI, PEERLA. no scalp lacerations   Neck: Soft, midline trachea. no c-spine tenderness  Chest: No chest wall tenderness, no subcutaneous emphysema   Cardiac: S1, S2, RRR  Respiratory: Bilateral breath sounds, clear and equal bilaterally  Abdomen: Soft, non-distended, non-tender, no rebound, no guarding.  Groin: Normal appearing, pelvis stable   Ext:  Moving b/l upper and lower extremities. Palpable Radial b/l UE, b/l DP palpable in LE.   Back: No T/L/S spine tenderness, No palpable runoff/stepoff/deformity  Rectal: No lucina blood, AKBAR with good tone    FAST:     Labs:  CAPILLARY BLOOD GLUCOSE                              13.8   10.69 )-----------( 202      ( 13 Sep 2023 20:52 )             42.7       Auto Neutrophil %: 66.7 % (09-13-23 @ 20:52)  Auto Immature Granulocyte %: 0.5 % (09-13-23 @ 20:52)    09-13    137  |  100  |  37<H>  ----------------------------<  108<H>  4.3   |  27  |  1.3      Calcium: 9.5 mg/dL (09-13-23 @ 20:52)      LFTs:             6.4  | 0.4  | 27       ------------------[74      ( 13 Sep 2023 20:52 )  3.8  | x    | 16          Lipase:22     Amylase:x         Lactate, Blood: 1.3 mmol/L (09-13-23 @ 20:52)      Coags:     13.30  ----< 1.16    ( 13 Sep 2023 20:52 )     40.1                Urinalysis Basic - ( 13 Sep 2023 20:52 )    Color: x / Appearance: x / SG: x / pH: x  Gluc: 108 mg/dL / Ketone: x  / Bili: x / Urobili: x   Blood: x / Protein: x / Nitrite: x   Leuk Esterase: x / RBC: x / WBC x   Sq Epi: x / Non Sq Epi: x / Bacteria: x                RADIOLOGY & ADDITIONAL STUDIES:  ---------------------------------------------------------------------------------------  < from: CT Head No Cont (09.13.23 @ 21:13) >  CT HEAD:  No acute intracranial findings.    Subcutaneous scalp hematoma is noted in the right parietal region.    Chronic microvascular ischemic change.    CT CERVICAL SPINE:  No acute fracture or dislocation.    Severe degenerative changes of the cervical spine with multilevel spinal   stenosis and neuroforaminal narrowing, most significant at C2-C3 where   there is moderate to severe spinal stenosis.    Posterior calcification encroaching upon the spinal canal is noted at the   level of C7-T1.    --- End of Report ---    < end of copied text >  < from: CT Abdomen and Pelvis w/ IV Cont (09.13.23 @ 21:40) >  IMPRESSION:    No evidence of abdominal or pelvic mass or inflammatory process.    No evidence of thoracic, abdominal or pelvic visceral injury, laceration,   or hematoma.    No evidence of acute fracture.    < end of copied text >

## 2023-09-13 NOTE — ED PROVIDER NOTE - CARE PROVIDER_API CALL
Josep Means  Orthopaedic Surgery  [address] [place] [address]  Phone: [phone]  Fax: [phone]  Follow Up Time: 1-3 Days

## 2023-09-13 NOTE — CONSULT NOTE ADULT - ASSESSMENT
89F w/ PMH as above, presents s/p mechanical fall +HT +AC -LOC, trauma pan scan performed and no acute traumatic injuries identified.    Injuries identified:   - None     PLAN:     ASSESSMENT:  External signs of injury on exam:   CTs reviewed, as above. No acute traumatic findings  No acute trauma surgical intervention  Plain film wet reads as per ED  Dispo as per ED  If pt admitted, trauma team to perform TTS in AM  Surgical Attending aware and agrees with plan          Disposition pending results of above labs and imaging  Above plan discussed with Trauma attending, Dr. Moss  , patient, patient family, and ED team  --------------------------------------------------------------------------------------  09-13-23 @ 23:22    TRAUMA SENIOR SPECTRA: [id number]  TRAUMA TEAM SPECTRA: 5840
37.1

## 2023-09-13 NOTE — ED PROVIDER NOTE - PATIENT PORTAL LINK FT
You can access the FollowMyHealth Patient Portal offered by [facility] by registering at the following website: [email or web address]. By joining [employer]’s FollowMyHealth portal, you will also be able to view your health information using other applications (apps) compatible with our system.

## 2023-09-13 NOTE — ED PROVIDER NOTE - NSFOLLOWUPINSTRUCTIONS_ED_ALL_ED_FT
Keep the splint on until you can follow-up with orthopedics.  You should not use your walker until you have seen orthopedics. Use your wheelchair until that time.     Orthopedics follow-up: Our Emergency Department Referral Coordinators will be reaching out to you in the next 24-48 hours from 9:00am to 5:00pm with a follow up appointment. Please expect a phone call from the hospital in that time frame. If you do not receive a call or if you have any questions or concerns, you can reach them at [phone]      You should also follow-up with Dr Royal in 1-3 days regarding your visit to the ED.      Fall prevention includes ways to make your home and other areas safer. It also includes ways you can move more carefully to prevent a fall. Health conditions that cause changes in your blood pressure, vision, or muscle strength and coordination may increase your risk for falls. Medicines may also increase your risk for falls if they make you dizzy, weak, or sleepy.     SEEK IMMEDIATE MEDICAL ATTENTION IF: You have fallen and are unconscious, you have fallen and cannot move part of your body, or you have fallen and have pain or a headache.     FALL PREVENTION TIPS:  Stand or sit up slowly. Use assistive devices as directed. You may need to have grab bars put in your bathroom near the toilet or in the shower.  Wear shoes that fit well and have soles that . Wear shoes both inside and outside. Do not wear shoes with high heels.  Wear a personal alarm that can call 911 in an emergency.  Manage your medical conditions. Keep all appointments with your healthcare providers. Visit your eye doctor as directed.      HOME SAFETY TIPS:  Put nonslip strips on your bath or shower floor to prevent you from slipping. Use a shower seat so you do not need to stand while you shower. Sit on the toilet or a chair in your bathroom to dry yourself and put on clothing. This will prevent you from losing your balance from drying or dressing yourself while you are standing.  Keep paths clear. Remove books, shoes, and other objects from walkways and stairs. Place cords for telephones and lamps out of the way so that you do not need to walk over them. Tape them down if you cannot move them. Remove small rugs or secure it with double-sided tape to prevent you from tripping. Install bright lights in your home. Use night lights to help light paths to the bathroom or kitchen. Always turn on the light before you start walking.  Keep items you use often on shelves within reach. Do not use a step stool to help you reach an item.  Place reflective tape on the edges of your stairs to see better.

## 2023-09-13 NOTE — ED PROVIDER NOTE - PHYSICAL EXAMINATION
Vital Signs: I have reviewed the initial vital signs.  Constitutional: Well-nourished, appears stated age, in no acute distress.  HEENT: +1 cm horizontal superfiical abrasion to right temporal scalp, otherwise head atraumatic. Airway patent, moist MM, no erythema/swelling/deformity of oral structures. EOMI, PERRLA.  CV: Regular rate, regular rhythm, well-perfused extremities, 2+ pulses bilaterally. DP/PT and radial pulses equal.  Lungs: CTAB, no increased WOB.  ABD: NTND, no guarding or rebound, no pulsatile masses, no hernias, no flank pain.   MSK: Neck supple, nontender, normal ROM, no stepoff. Chest nontender. Back nontender in TLS spine or to bilateral bony structures. +tenderness and hematoma over dorsum of right hand over distal MC bones without obvious deformity. No snuffbox tenderness, no wrist tenderness. Extremities otherwise nontender, normal ROM, no deformities. Negative SLR b/l.   INTEG: Skin warm, dry, no rash.  NEURO: A&Ox3, moving all extremities, normal speech.   PSYCH: Calm, cooperative, normal affect and interaction.

## 2023-09-13 NOTE — ED PROVIDER NOTE - PROGRESS NOTE DETAILS
Pt signed out to Dr. Meyers TJY: pt cleared by surgery TJY: all results and incidental findings d/w family (daughter) bedside. Importance of a splint for her hand tenderness d/w pt and family. They note she typically uses a walker at home, but with the splint they can use her wheelchair and care for her at home until she can follow-up with orthopedics.

## 2023-09-13 NOTE — ED PROVIDER NOTE - CLINICAL SUMMARY MEDICAL DECISION MAKING FREE TEXT BOX
Ndubuisi: Sign out received from Dr. Mathew. Pt presented after a fall. Trauma alert activated. Required work up and had no acute findings or injuries. Plan is dc in the care of family and outpt f/up.

## 2023-09-13 NOTE — ED ADULT TRIAGE NOTE - CHIEF COMPLAINT QUOTE
pt fall from standing. turned too quick and lost her balance. pt has small lac to right side of head. no loc. pt on eliquis

## 2023-09-13 NOTE — ED PROVIDER NOTE - ATTENDING CONTRIBUTION TO CARE
89 yr old f w/ a pmh significant for afib (on eliquis), CAD s/p stents, cataracts, and breast cancer in remission who presents s/p fall. As per family, pt was walking to the living room, when she had a mechanical fall and fell. Pt complaining R hand/wrist pain. Pt denies any LOC, chest pain, nausea, vomiting, fevers, chills or any other medical complaints.     VITAL SIGNS: I have reviewed nursing notes and confirm.  CONSTITUTIONAL: non-toxic, well appearing  SKIN: no rash, no petechiae.  EYES: EOMI, pink conjunctiva, anicteric  ENT: tongue midline, no exudates, MMM. R temportal there is a 1 cm laceration, not bleeding no foreign body.   NECK: Supple; no meningismus, no JVD  CARD: RRR, no murmurs, equal radial pulses bilaterally 2+  RESP: CTAB, no respiratory distress  ABD: Soft, non-tender, non-distended, no peritoneal signs, no HSM, no CVA tenderness  EXT: Normal ROM x4. No edema. No calves tenderness  NEURO: Alert, oriented. CN2-12 intact, equal strength bilaterally, nl gait.  PSYCH: Cooperative, appropriate.      89 yr old f that presents s/p fall, as a trauma notification. labs, imaging, reassess. dispo pending.

## 2023-09-14 NOTE — ED ADULT NURSE NOTE - NSFALLUNIVINTERV_ED_ALL_ED
Bed/Stretcher in lowest position, wheels locked, appropriate side rails in place/Call bell, personal items and telephone in reach/Instruct patient to call for assistance before getting out of bed/chair/stretcher/Non-slip footwear applied when patient is off stretcher/[place] to call system/Physically safe environment - no spills, clutter or unnecessary equipment/Purposeful proactive rounding/Room/bathroom lighting operational, light cord in reach

## 2024-02-16 NOTE — DISCHARGE NOTE PROVIDER - NSDCHHNEEDSERVICE_GEN_ALL_CORE
-- with potential vascular trauma (sudden extreme movements of the head/neck, chiropractor manipulation etc)   -- Ultrasounds ordered today, please schedule   -- Follow up in 4-6 weeks, earlier if needed   
Wound care and assessment

## 2024-09-03 NOTE — ED PROVIDER NOTE - SPECIALTY CARE
HPI    Patient presents for concerns of left sided knee pain.  With a history of arthritis of the knee.      History of dementia, CVA, hypertension.  Has been off of medication with normal bp's since April.      Review of Systems   Musculoskeletal:         Chronic left knee pain.   All other systems reviewed and are negative.       Vitals:    09/03/24 1746   BP: 117/73   Pulse: 76   Weight: 129 lb 2 oz (58.6 kg)   Height: 5' 2\" (1.575 m)     Body mass index is 23.62 kg/m².    Health Maintenance   Topic Date Due    Zoster Vaccines (1 of 2) Never done    Pneumococcal Vaccine: 65+ Years (1 of 1 - PCV) Never done    MA Annual Health Assessment  Never done    Annual Depression Screening  Never done    Fall Risk Screening (Annual)  Never done    COVID-19 Vaccine (1 - 2023-24 season) Never done    Influenza Vaccine (1) 10/01/2024       No LMP recorded.    Past Medical History:    Dementia (HCC)    Essential hypertension    Glaucoma    Stroke (HCC)       .History reviewed. No pertinent surgical history.    Family History   Problem Relation Age of Onset    Uterine Cancer Sister     Breast Cancer 2nd occurrence Sister     Pancreatic Cancer Other     Other (cirrhosis) Other        Social History     Socioeconomic History    Marital status:      Spouse name: Not on file    Number of children: Not on file    Years of education: Not on file    Highest education level: Not on file   Occupational History    Not on file   Tobacco Use    Smoking status: Never    Smokeless tobacco: Never   Vaping Use    Vaping status: Never Used   Substance and Sexual Activity    Alcohol use: Never    Drug use: Never    Sexual activity: Not on file   Other Topics Concern    Not on file   Social History Narrative    Not on file     Social Determinants of Health     Financial Resource Strain: Not on file   Food Insecurity: Not on file   Transportation Needs: Not on file   Physical Activity: Not on file   Stress: Not on file   Social Connections:  Not on file   Housing Stability: Not on file       Current Outpatient Medications   Medication Sig Dispense Refill    vitamin B-12 50 MCG Oral Tab Take 1 tablet (50 mcg total) by mouth daily.         Allergies:  No Known Allergies    Physical Exam  Vitals and nursing note reviewed.   Constitutional:       Appearance: Normal appearance.   Cardiovascular:      Rate and Rhythm: Normal rate and regular rhythm.      Heart sounds: Normal heart sounds.   Pulmonary:      Effort: Pulmonary effort is normal. No respiratory distress.      Breath sounds: Normal breath sounds. No stridor. No wheezing, rhonchi or rales.   Chest:      Chest wall: No tenderness.   Musculoskeletal:      Left knee: Decreased range of motion. Tenderness present.   Neurological:      Mental Status: She is alert.          Assessment and Plan:  Problem List Items Addressed This Visit    None  Visit Diagnoses       Chronic pain of left knee    -  Primary    Relevant Orders    XR KNEE, COMPLETE (4 OR MORE VIEWS), LEFT (CPT=73564)    History of hypertension        Dementia, unspecified dementia severity, unspecified dementia type, unspecified whether behavioral, psychotic, or mood disturbance or anxiety (HCC)        Relevant Orders    Neuro Referral - In Network    History of CVA (cerebrovascular accident)        Relevant Orders    Neuro Referral - In Network           X-ray of left knee today.  Referral to neurology for assessment of dementia.  Patient to follow-up with Dr. Arce as scheduled.     Discussed plan of care with patient and patient is in agreement.  All questions answered. Patient to call with questions or concerns.    Encouraged to sign up for My Chart if not already registered.    Orthopedic Surgery

## 2025-01-01 ENCOUNTER — INPATIENT (INPATIENT)
Facility: HOSPITAL | Age: 89
LOS: 2 days | DRG: 682 | End: 2025-03-07
Attending: INTERNAL MEDICINE | Admitting: INTERNAL MEDICINE
Payer: MEDICARE

## 2025-01-01 VITALS
OXYGEN SATURATION: 91 % | DIASTOLIC BLOOD PRESSURE: 52 MMHG | SYSTOLIC BLOOD PRESSURE: 91 MMHG | HEART RATE: 56 BPM | RESPIRATION RATE: 22 BRPM

## 2025-01-01 VITALS
HEART RATE: 89 BPM | DIASTOLIC BLOOD PRESSURE: 91 MMHG | OXYGEN SATURATION: 96 % | SYSTOLIC BLOOD PRESSURE: 136 MMHG | RESPIRATION RATE: 30 BRPM

## 2025-01-01 DIAGNOSIS — R06.00 DYSPNEA, UNSPECIFIED: ICD-10-CM

## 2025-01-01 DIAGNOSIS — R09.89 OTHER SPECIFIED SYMPTOMS AND SIGNS INVOLVING THE CIRCULATORY AND RESPIRATORY SYSTEMS: ICD-10-CM

## 2025-01-01 DIAGNOSIS — J96.00 ACUTE RESPIRATORY FAILURE, UNSPECIFIED WHETHER WITH HYPOXIA OR HYPERCAPNIA: ICD-10-CM

## 2025-01-01 DIAGNOSIS — Z96.652 PRESENCE OF LEFT ARTIFICIAL KNEE JOINT: Chronic | ICD-10-CM

## 2025-01-01 DIAGNOSIS — Z51.5 ENCOUNTER FOR PALLIATIVE CARE: ICD-10-CM

## 2025-01-01 DIAGNOSIS — Z71.89 OTHER SPECIFIED COUNSELING: ICD-10-CM

## 2025-01-01 DIAGNOSIS — Z98.890 OTHER SPECIFIED POSTPROCEDURAL STATES: Chronic | ICD-10-CM

## 2025-01-01 LAB
ALBUMIN SERPL ELPH-MCNC: 2.8 G/DL — LOW (ref 3.5–5.2)
ALP SERPL-CCNC: 307 U/L — HIGH (ref 30–115)
ALT FLD-CCNC: 85 U/L — HIGH (ref 0–41)
ANION GAP SERPL CALC-SCNC: 24 MMOL/L — HIGH (ref 7–14)
APTT BLD: 34.1 SEC — SIGNIFICANT CHANGE UP (ref 27–39.2)
AST SERPL-CCNC: 85 U/L — HIGH (ref 0–41)
BASE EXCESS BLDV CALC-SCNC: -10.5 MMOL/L — LOW (ref -2–3)
BASOPHILS # BLD AUTO: 0.04 K/UL — SIGNIFICANT CHANGE UP (ref 0–0.2)
BASOPHILS NFR BLD AUTO: 0.2 % — SIGNIFICANT CHANGE UP (ref 0–1)
BILIRUB SERPL-MCNC: 0.5 MG/DL — SIGNIFICANT CHANGE UP (ref 0.2–1.2)
BUN SERPL-MCNC: 139 MG/DL — CRITICAL HIGH (ref 10–20)
CA-I SERPL-SCNC: 1.16 MMOL/L — SIGNIFICANT CHANGE UP (ref 1.15–1.33)
CALCIUM SERPL-MCNC: 8.7 MG/DL — SIGNIFICANT CHANGE UP (ref 8.4–10.5)
CHLORIDE SERPL-SCNC: 100 MMOL/L — SIGNIFICANT CHANGE UP (ref 98–110)
CO2 SERPL-SCNC: 16 MMOL/L — LOW (ref 17–32)
CREAT SERPL-MCNC: 4 MG/DL — HIGH (ref 0.7–1.5)
EGFR: 10 ML/MIN/1.73M2 — LOW
EGFR: 10 ML/MIN/1.73M2 — LOW
EOSINOPHIL # BLD AUTO: 0.04 K/UL — SIGNIFICANT CHANGE UP (ref 0–0.7)
EOSINOPHIL NFR BLD AUTO: 0.2 % — SIGNIFICANT CHANGE UP (ref 0–8)
FLUAV AG NPH QL: SIGNIFICANT CHANGE UP
FLUBV AG NPH QL: SIGNIFICANT CHANGE UP
GAS PNL BLDV: 131 MMOL/L — LOW (ref 136–145)
GAS PNL BLDV: SIGNIFICANT CHANGE UP
GLUCOSE SERPL-MCNC: 122 MG/DL — HIGH (ref 70–99)
HCO3 BLDV-SCNC: 17 MMOL/L — LOW (ref 22–29)
HCT VFR BLD CALC: 31.4 % — LOW (ref 37–47)
HCT VFR BLD CALC: 36.6 % — LOW (ref 37–47)
HGB BLD-MCNC: 10.7 G/DL — LOW (ref 12–16)
HGB BLD-MCNC: 12.1 G/DL — SIGNIFICANT CHANGE UP (ref 12–16)
IMM GRANULOCYTES NFR BLD AUTO: 0.8 % — HIGH (ref 0.1–0.3)
INR BLD: 1.62 RATIO — HIGH (ref 0.65–1.3)
LACTATE BLDV-MCNC: 5.3 MMOL/L — CRITICAL HIGH (ref 0.5–2)
LACTATE SERPL-SCNC: 4.7 MMOL/L — CRITICAL HIGH (ref 0.7–2)
LACTATE SERPL-SCNC: 5.8 MMOL/L — CRITICAL HIGH (ref 0.7–2)
LYMPHOCYTES # BLD AUTO: 0.52 K/UL — LOW (ref 1.2–3.4)
LYMPHOCYTES # BLD AUTO: 2.9 % — LOW (ref 20.5–51.1)
MCHC RBC-ENTMCNC: 32.7 PG — HIGH (ref 27–31)
MCHC RBC-ENTMCNC: 32.9 PG — HIGH (ref 27–31)
MCHC RBC-ENTMCNC: 33.1 G/DL — SIGNIFICANT CHANGE UP (ref 32–37)
MCHC RBC-ENTMCNC: 34.1 G/DL — SIGNIFICANT CHANGE UP (ref 32–37)
MCV RBC AUTO: 96 FL — SIGNIFICANT CHANGE UP (ref 81–99)
MCV RBC AUTO: 99.5 FL — HIGH (ref 81–99)
MONOCYTES # BLD AUTO: 0.05 K/UL — LOW (ref 0.1–0.6)
MONOCYTES NFR BLD AUTO: 0.3 % — LOW (ref 1.7–9.3)
NEUTROPHILS # BLD AUTO: 17.17 K/UL — HIGH (ref 1.4–6.5)
NEUTROPHILS NFR BLD AUTO: 95.6 % — HIGH (ref 42.2–75.2)
NRBC BLD AUTO-RTO: 0 /100 WBCS — SIGNIFICANT CHANGE UP (ref 0–0)
NRBC BLD AUTO-RTO: 2 /100 WBCS — HIGH (ref 0–0)
NT-PROBNP SERPL-SCNC: 9169 PG/ML — HIGH (ref 0–300)
PCO2 BLDV: 44 MMHG — HIGH (ref 39–42)
PH BLDV: 7.2 — CRITICAL LOW (ref 7.32–7.43)
PLATELET # BLD AUTO: 110 K/UL — LOW (ref 130–400)
PLATELET # BLD AUTO: 151 K/UL — SIGNIFICANT CHANGE UP (ref 130–400)
PMV BLD: 12.3 FL — HIGH (ref 7.4–10.4)
PMV BLD: 12.6 FL — HIGH (ref 7.4–10.4)
PO2 BLDV: 34 MMHG — SIGNIFICANT CHANGE UP (ref 25–45)
POTASSIUM BLDV-SCNC: 5.5 MMOL/L — HIGH (ref 3.5–5.1)
POTASSIUM SERPL-MCNC: 5.3 MMOL/L — HIGH (ref 3.5–5)
POTASSIUM SERPL-SCNC: 5.3 MMOL/L — HIGH (ref 3.5–5)
PROT SERPL-MCNC: 5.6 G/DL — LOW (ref 6–8)
PROTHROM AB SERPL-ACNC: 19.3 SEC — HIGH (ref 9.95–12.87)
RBC # BLD: 3.27 M/UL — LOW (ref 4.2–5.4)
RBC # BLD: 3.68 M/UL — LOW (ref 4.2–5.4)
RBC # FLD: 15.9 % — HIGH (ref 11.5–14.5)
RBC # FLD: 16.1 % — HIGH (ref 11.5–14.5)
RSV RNA NPH QL NAA+NON-PROBE: SIGNIFICANT CHANGE UP
SAO2 % BLDV: 53.9 % — LOW (ref 67–88)
SARS-COV-2 RNA SPEC QL NAA+PROBE: SIGNIFICANT CHANGE UP
SODIUM SERPL-SCNC: 140 MMOL/L — SIGNIFICANT CHANGE UP (ref 135–146)
TROPONIN T, HIGH SENSITIVITY RESULT: 152 NG/L — CRITICAL HIGH (ref 6–13)
WBC # BLD: 17.97 K/UL — HIGH (ref 4.8–10.8)
WBC # BLD: 33.4 K/UL — HIGH (ref 4.8–10.8)
WBC # FLD AUTO: 17.97 K/UL — HIGH (ref 4.8–10.8)
WBC # FLD AUTO: 33.4 K/UL — HIGH (ref 4.8–10.8)

## 2025-01-01 PROCEDURE — 99223 1ST HOSP IP/OBS HIGH 75: CPT

## 2025-01-01 PROCEDURE — 71275 CT ANGIOGRAPHY CHEST: CPT | Mod: 26

## 2025-01-01 PROCEDURE — 99285 EMERGENCY DEPT VISIT HI MDM: CPT

## 2025-01-01 PROCEDURE — 85027 COMPLETE CBC AUTOMATED: CPT

## 2025-01-01 PROCEDURE — 99231 SBSQ HOSP IP/OBS SF/LOW 25: CPT

## 2025-01-01 PROCEDURE — 99233 SBSQ HOSP IP/OBS HIGH 50: CPT

## 2025-01-01 PROCEDURE — 71045 X-RAY EXAM CHEST 1 VIEW: CPT | Mod: 26

## 2025-01-01 PROCEDURE — 93010 ELECTROCARDIOGRAM REPORT: CPT

## 2025-01-01 PROCEDURE — 71275 CT ANGIOGRAPHY CHEST: CPT | Mod: MC

## 2025-01-01 PROCEDURE — 76604 US EXAM CHEST: CPT | Mod: 26

## 2025-01-01 PROCEDURE — 36415 COLL VENOUS BLD VENIPUNCTURE: CPT

## 2025-01-01 PROCEDURE — 99232 SBSQ HOSP IP/OBS MODERATE 35: CPT

## 2025-01-01 PROCEDURE — 93308 TTE F-UP OR LMTD: CPT | Mod: 26

## 2025-01-01 PROCEDURE — 83605 ASSAY OF LACTIC ACID: CPT

## 2025-01-01 RX ORDER — SODIUM CHLORIDE 9 G/1000ML
1000 INJECTION, SOLUTION INTRAVENOUS ONCE
Refills: 0 | Status: DISCONTINUED | OUTPATIENT
Start: 2025-01-01 | End: 2025-01-01

## 2025-01-01 RX ORDER — HEPARIN SODIUM 1000 [USP'U]/ML
INJECTION INTRAVENOUS; SUBCUTANEOUS
Qty: 25000 | Refills: 0 | Status: DISCONTINUED | OUTPATIENT
Start: 2025-01-01 | End: 2025-01-01

## 2025-01-01 RX ORDER — ATORVASTATIN CALCIUM 80 MG/1
1 TABLET, FILM COATED ORAL
Refills: 0 | DISCHARGE

## 2025-01-01 RX ORDER — HEPARIN SODIUM 1000 [USP'U]/ML
5500 INJECTION INTRAVENOUS; SUBCUTANEOUS ONCE
Refills: 0 | Status: COMPLETED | OUTPATIENT
Start: 2025-01-01 | End: 2025-01-01

## 2025-01-01 RX ORDER — ESCITALOPRAM OXALATE 20 MG/1
1 TABLET ORAL
Refills: 0 | DISCHARGE

## 2025-01-01 RX ORDER — HEPARIN SODIUM 1000 [USP'U]/ML
2500 INJECTION INTRAVENOUS; SUBCUTANEOUS EVERY 6 HOURS
Refills: 0 | Status: DISCONTINUED | OUTPATIENT
Start: 2025-01-01 | End: 2025-01-01

## 2025-01-01 RX ORDER — LORAZEPAM 4 MG/ML
0.5 VIAL (ML) INJECTION
Refills: 0 | Status: DISCONTINUED | OUTPATIENT
Start: 2025-01-01 | End: 2025-01-01

## 2025-01-01 RX ORDER — CANDESARTAN CILEXETIL 8 MG/1
1 TABLET ORAL
Refills: 0 | DISCHARGE

## 2025-01-01 RX ORDER — GLYCOPYRROLATE 0.2 MG/ML
0.4 INJECTION INTRAMUSCULAR; INTRAVENOUS
Refills: 0 | Status: DISCONTINUED | OUTPATIENT
Start: 2025-01-01 | End: 2025-01-01

## 2025-01-01 RX ORDER — HYDROMORPHONE/SOD CHLOR,ISO/PF 2 MG/10 ML
0.5 SYRINGE (ML) INJECTION
Refills: 0 | Status: DISCONTINUED | OUTPATIENT
Start: 2025-01-01 | End: 2025-01-01

## 2025-01-01 RX ORDER — HEPARIN SODIUM 1000 [USP'U]/ML
5500 INJECTION INTRAVENOUS; SUBCUTANEOUS EVERY 6 HOURS
Refills: 0 | Status: DISCONTINUED | OUTPATIENT
Start: 2025-01-01 | End: 2025-01-01

## 2025-01-01 RX ORDER — NOREPINEPHRINE BITARTRATE 8 MG
0.05 SOLUTION INTRAVENOUS
Qty: 16 | Refills: 0 | Status: DISCONTINUED | OUTPATIENT
Start: 2025-01-01 | End: 2025-01-01

## 2025-01-01 RX ADMIN — Medication 0.5 MILLIGRAM(S): at 08:39

## 2025-01-01 RX ADMIN — Medication 0.5 MILLIGRAM(S): at 10:20

## 2025-01-01 RX ADMIN — HEPARIN SODIUM 5500 UNIT(S): 1000 INJECTION INTRAVENOUS; SUBCUTANEOUS at 06:56

## 2025-01-01 RX ADMIN — Medication 0.5 MILLIGRAM(S): at 17:22

## 2025-01-01 RX ADMIN — Medication 0.5 MILLIGRAM(S): at 04:25

## 2025-01-01 RX ADMIN — GLYCOPYRROLATE 0.4 MILLIGRAM(S): 0.2 INJECTION INTRAMUSCULAR; INTRAVENOUS at 18:43

## 2025-01-01 RX ADMIN — Medication 0.5 MILLIGRAM(S): at 12:13

## 2025-01-01 RX ADMIN — Medication 0.5 MILLIGRAM(S): at 17:52

## 2025-01-01 RX ADMIN — Medication 0.5 MILLIGRAM(S): at 21:52

## 2025-01-01 RX ADMIN — Medication 0.5 MILLIGRAM(S): at 10:05

## 2025-01-01 RX ADMIN — Medication 0.5 MILLIGRAM(S): at 09:11

## 2025-01-01 RX ADMIN — Medication 0.5 MILLIGRAM(S): at 11:43

## 2025-01-01 RX ADMIN — HEPARIN SODIUM 1200 UNIT(S)/HR: 1000 INJECTION INTRAVENOUS; SUBCUTANEOUS at 06:57

## 2025-01-01 RX ADMIN — GLYCOPYRROLATE 0.4 MILLIGRAM(S): 0.2 INJECTION INTRAMUSCULAR; INTRAVENOUS at 10:05

## 2025-01-01 RX ADMIN — Medication 0.5 MILLIGRAM(S): at 22:16

## 2025-01-02 NOTE — CONSULT NOTE ADULT - RS GEN HX ROS MEA POS PC
Patient refused breakfast. He was in bed for the morning. He did not attend groups this am. He is more visible and social in the afternoon. He is compliant with medications. He states he still has auditory hallucinations. Patient dressed in own shirt and green hospital pants. Patient does not have pants. Patient ate 100% for lunch.    dyspnea/wheezing

## 2025-03-04 NOTE — ED PROVIDER NOTE - PROGRESS NOTE DETAILS
pk: pt tachypneic hypoxic on arrival, BiPAP initiated, disc with health care proxy bedside who wishes the patient to be DNR DNI. bedside echo and lung sono reviewed demonstrating no signs of fluid overload, + mconnnell's sign, given sudden onset of symptoms and these findings, high suspicion for PE. pt is tachypneic and will not tolerate ct pe study at this time. discussed with family risks vs benefits of delaying ct scan and giving heparin at this time to treat potential clot burden. family would like heparin to be given at this time.

## 2025-03-04 NOTE — ED PROVIDER NOTE - OBJECTIVE STATEMENT
89 y.o. female PMH of HTN, HLD, Afib on Eliquis, breast ca in remission, recurrent UTIs, presenting to the ED brought in by daughter for shortness of breath since 3am. Daughter states she lives with her and heard her wake up from coughing. She states she went to check on her and noted her to have difficulty breathing. Daughter reports patient is not oriented at baseline. Denies any known fevers, n/v/d, sick contacts. Denies any recent hospital admissions.

## 2025-03-04 NOTE — ED PROVIDER NOTE - CARE PLAN
Principal Discharge DX:	Acute hypoxic respiratory failure  Secondary Diagnosis:	Acute renal failure   1

## 2025-03-04 NOTE — CONSULT NOTE ADULT - SUBJECTIVE AND OBJECTIVE BOX
Patient is a 89y old  Female who presents with a chief complaint of Acute SOB (04 Mar 2025 10:16)      HPI:  89 y.o. female PMH of HTN, HLD, PCI sp 3 stents in 2007 Afib on Eliquis, breast ca in remission since 1992 , recurrent UTIs, presenting to the ED brought in by daughter for shortness of breath since 3am. Daughter states she lives with her and heard her wake up from coughing. She states she went to check on her and noted her to have difficulty breathing. Daughter reports patient is not oriented at baseline. Denies any known fevers, n/v/d, sick contacts. Denies any recent hospital admissions.Ne    1 week ago had a fever 2ry to UTI, finished a course of ciprofloxacin  allergic to pencillin, sulfa, levofloxacin and opiates  in the ED was satting 89% on non rebreather, placed on BPAP PEEP 6 IPAP 12 50% sating at 99%   Bedside echo showed positive McConnells sign, no signs of volumer overload, recieved 1 liter bolus  Labs showing Multisystem organ failure with ABRAHAN Cr 4 (family denies any renal history), ALT/AST 85/85, elevated INR (2ry to eliquis)    VBG 7.2, PCO2 44 HCO3 17 Metabolic acidosis with underlying resp acidosis    Patient made CMO on admission    (04 Mar 2025 08:34)      PAST MEDICAL & SURGICAL HISTORY:  Breast cancer      Cataract      Stented coronary artery      Atrial fibrillation      H/O total knee replacement, left      S/P lumpectomy, right breast          SOCIAL HX:   Smoking                         ETOH                            Other    FAMILY HISTORY:  :  No known cardiovacular family hisotry     Review Of Systems:     All ROS are negative except per HPI       Allergies    opioid-like analgesics (Unknown)  Levaquin (Unknown)  sulfa drugs (Hives)  penicillin (Hives)    Intolerances          PHYSICAL EXAM    ICU Vital Signs Last 24 Hrs  T(C): 36.9 (04 Mar 2025 07:30), Max: 36.9 (04 Mar 2025 07:30)  T(F): 98.4 (04 Mar 2025 07:30), Max: 98.4 (04 Mar 2025 07:30)  HR: 68 (04 Mar 2025 09:00) (68 - 89)  BP: 60/35 (04 Mar 2025 09:00) (52/31 - 136/91)  BP(mean): 41 (04 Mar 2025 08:45) (38 - 67)  ABP: --  ABP(mean): --  RR: 22 (04 Mar 2025 09:00) (22 - 30)  SpO2: 99% (04 Mar 2025 09:00) (96% - 99%)    O2 Parameters below as of 04 Mar 2025 09:00  Patient On (Oxygen Delivery Method): BiPAP 12/6    O2 Concentration (%): 50        CONSTITUTIONAL:  ill appearing   Twitching        CARDIAC:   Normal rate,   Regular rhythm.    No edema        RESPIRATORY:   Bilateral BS   Twitching and minimally responsive       GASTROINTESTINAL:  Abdomen soft,   Non-tender,   No guarding,   + BS      NEUROLOGICAL:   Minimally responsive                   LABS:                          12.1   17.97 )-----------( 151      ( 04 Mar 2025 05:50 )             36.6                                               03-04    140  |  100  |  139[HH]  ----------------------------<  122[H]  5.3[H]   |  16[L]  |  4.0[H]    Ca    8.7      04 Mar 2025 05:50    TPro  5.6[L]  /  Alb  2.8[L]  /  TBili  0.5  /  DBili  x   /  AST  85[H]  /  ALT  85[H]  /  AlkPhos  307[H]  03-04      PT/INR - ( 04 Mar 2025 06:10 )   PT: 19.30 sec;   INR: 1.62 ratio         PTT - ( 04 Mar 2025 06:10 )  PTT:34.1 sec                                       Urinalysis Basic - ( 04 Mar 2025 05:50 )    Color: x / Appearance: x / SG: x / pH: x  Gluc: 122 mg/dL / Ketone: x  / Bili: x / Urobili: x   Blood: x / Protein: x / Nitrite: x   Leuk Esterase: x / RBC: x / WBC x   Sq Epi: x / Non Sq Epi: x / Bacteria: x                                                  LIVER FUNCTIONS - ( 04 Mar 2025 05:50 )  Alb: 2.8 g/dL / Pro: 5.6 g/dL / ALK PHOS: 307 U/L / ALT: 85 U/L / AST: 85 U/L / GGT: x                                                                                                                                       X-Rays reviewed                                                                                     ECHO    CXR interpreted by me     MEDICATIONS  (STANDING):    MEDICATIONS  (PRN):  glycopyrrolate Injectable 0.4 milliGRAM(s) IV Push four times a day PRN Secretions  HYDROmorphone  Injectable 0.5 milliGRAM(s) IV Push every 2 hours PRN Moderate pain (4-6), Severe pain (7-10), Respiratory rate greater than 22  LORazepam   Injectable 0.5 milliGRAM(s) IV Push every 2 hours PRN Anxiety         Patient is a 89y old  Female who presents with a chief complaint of Acute SOB (04 Mar 2025 10:16)      HPI:  89 y.o. female PMH of HTN, HLD, PCI sp 3 stents in 2007 Afib on Eliquis, breast ca in remission since 1992 , recurrent UTIs, presenting to the ED brought in by daughter for shortness of breath since 3am. Daughter states she lives with her and heard her wake up from coughing. She states she went to check on her and noted her to have difficulty breathing. Daughter reports patient is not oriented at baseline. Denies any known fevers, n/v/d, sick contacts. Denies any recent hospital admissions.     1 week ago had a fever 2ry to UTI, finished a course of ciprofloxacin  allergic to pencillin, sulfa, levofloxacin and opiates  in the ED was satting 89% on non rebreather, placed on BPAP PEEP 6 IPAP 12 50% sating at 99%   Bedside echo showed positive McConnells sign, no signs of volumer overload, recieved 1 liter bolus  Labs showing Multisystem organ failure with ABRAHAN Cr 4 (family denies any renal history), ALT/AST 85/85, elevated INR (2ry to eliquis)    VBG 7.2, PCO2 44 HCO3 17 Metabolic acidosis with underlying resp acidosis    Patient made CMO on admission    (04 Mar 2025 08:34)      PAST MEDICAL & SURGICAL HISTORY:  Breast cancer      Cataract      Stented coronary artery      Atrial fibrillation      H/O total knee replacement, left      S/P lumpectomy, right breast          SOCIAL HX:   Smoking       UTO                   ETOH                            Other    FAMILY HISTORY:  :  No known cardiovacular family hisotry     Review Of Systems:     All ROS are negative except per HPI       Allergies    opioid-like analgesics (Unknown)  Levaquin (Unknown)  sulfa drugs (Hives)  penicillin (Hives)    Intolerances          PHYSICAL EXAM    ICU Vital Signs Last 24 Hrs  T(C): 36.9 (04 Mar 2025 07:30), Max: 36.9 (04 Mar 2025 07:30)  T(F): 98.4 (04 Mar 2025 07:30), Max: 98.4 (04 Mar 2025 07:30)  HR: 68 (04 Mar 2025 09:00) (68 - 89)  BP: 60/35 (04 Mar 2025 09:00) (52/31 - 136/91)  BP(mean): 41 (04 Mar 2025 08:45) (38 - 67)  ABP: --  ABP(mean): --  RR: 22 (04 Mar 2025 09:00) (22 - 30)  SpO2: 99% (04 Mar 2025 09:00) (96% - 99%)    O2 Parameters below as of 04 Mar 2025 09:00  Patient On (Oxygen Delivery Method): BiPAP 12/6    O2 Concentration (%): 50        CONSTITUTIONAL:  ill appearing       CARDIAC:   Normal rate,   Regular rhythm.    No edema      RESPIRATORY:   Bilateral BS   Twitching and minimally responsive       GASTROINTESTINAL:  Abdomen soft,   Non-tender,   No guarding,   + BS      NEUROLOGICAL:   Minimally responsive                   LABS:                          12.1   17.97 )-----------( 151      ( 04 Mar 2025 05:50 )             36.6                                               03-04    140  |  100  |  139[HH]  ----------------------------<  122[H]  5.3[H]   |  16[L]  |  4.0[H]    Ca    8.7      04 Mar 2025 05:50    TPro  5.6[L]  /  Alb  2.8[L]  /  TBili  0.5  /  DBili  x   /  AST  85[H]  /  ALT  85[H]  /  AlkPhos  307[H]  03-04      PT/INR - ( 04 Mar 2025 06:10 )   PT: 19.30 sec;   INR: 1.62 ratio         PTT - ( 04 Mar 2025 06:10 )  PTT:34.1 sec                                       Urinalysis Basic - ( 04 Mar 2025 05:50 )    Color: x / Appearance: x / SG: x / pH: x  Gluc: 122 mg/dL / Ketone: x  / Bili: x / Urobili: x   Blood: x / Protein: x / Nitrite: x   Leuk Esterase: x / RBC: x / WBC x   Sq Epi: x / Non Sq Epi: x / Bacteria: x                                                  LIVER FUNCTIONS - ( 04 Mar 2025 05:50 )  Alb: 2.8 g/dL / Pro: 5.6 g/dL / ALK PHOS: 307 U/L / ALT: 85 U/L / AST: 85 U/L / GGT: x                                                                                                                                       X-Rays reviewed                                                                                     ECHO      MEDICATIONS  (STANDING):    MEDICATIONS  (PRN):  glycopyrrolate Injectable 0.4 milliGRAM(s) IV Push four times a day PRN Secretions  HYDROmorphone  Injectable 0.5 milliGRAM(s) IV Push every 2 hours PRN Moderate pain (4-6), Severe pain (7-10), Respiratory rate greater than 22  LORazepam   Injectable 0.5 milliGRAM(s) IV Push every 2 hours PRN Anxiety

## 2025-03-04 NOTE — CONSULT NOTE ADULT - ASSESSMENT
IMPRESSION:  Patient is a 89 yo Female with PMH of HTN, HLD, Afib on Eliquis, HO breast cancer, recurrent UTIs who presented for SOB. Per daughter, Patient has been increasingly confused over the past 2 weeks and was recently treated for a UTI with Ciprofloxacin 500 mg BID, completed on 3/02/25. Patient takes daily Advil, x1 pill of unknown dose. Denies fevers, N/V/D, sick contacts.     #Nonoliguric ABRAHAN on ?CKD stage 3b  #Monitor Lytes    PLAN  #Nonoliguric ABRAHAN on ?CKD stage 3b  -Current BUN/SCr 139/4.0 (baseline Cr 1.3, GFR 37). Patient likely has preexisting CKD, not following with outpatient nephrologist. ABRAHAN like multifactorial, hepatorenal given hypoalbuminemia, cardiorenal given RBBB on ECG and RV hypokinesis on bedside ECHO, and 2/2 distributive shock.   -Family does not want dialysis at this time, Patient made CMO this morning  -Please reconsult if Patient status changes    #Monitor Lytes  -Potassium on admission 5.3.    Avoid nephrotoxic medications. Adjust all medications to GFR.

## 2025-03-04 NOTE — H&P ADULT - ASSESSMENT
IMPRESSION:    #Acute hypoxic hypercarbic resp failure, rule out PE  #Afib  #CAD  #CMO        PLAN:    Palliatve care consult, DC levophed, DC heparin drip    CNS: keep comfortable    HEENT: Oral care    PULMONARY:  HOB @ 45 degrees.  Aspiration precautions     CARDIOVASCULAR:    GI: GI prophylaxis.  Feeding.  Bowel regimen     RENAL:  CMO    INFECTIOUS DISEASE: Follow up cultures    HEMATOLOGICAL:  DVT prophylaxis.    ENDOCRINE:  Follow up FS.  Insulin protocol if needed.    MUSCULOSKELETAL: bed rest         IMPRESSION:    #Acute hypoxic hypercarbic resp failure, rule out PE  #MSOF: ABRAHAN, liver insult, hypotension  #Afib  #CAD  #CMO        PLAN:    Palliatve care consult, DC levophed, DC heparin drip DC blood work  PRN Dilaudid and glycopyrrolate     CNS: keep comfortable    HEENT: Oral care    PULMONARY:  HOB @ 45 degrees.  Aspiration precautions     CARDIOVASCULAR: CMO    GI: GI prophylaxis.  Feeding.  Bowel regimen     RENAL:  CMO    INFECTIOUS DISEASE: CMO    HEMATOLOGICAL:  CMO    ENDOCRINE:  CMO    MUSCULOSKELETAL: bed rest

## 2025-03-04 NOTE — CONSULT NOTE ADULT - CONVERSATION DETAILS
Spoke To the family about grave prognosis, given the severe hypotension noted at the time of my exam.  The patient would need a central line and had impending need for HD if her renal function worsened- for which she would be a poor candidate. She is minimally responsive, has advanced dementia and escalation of care would offer very little benefit to her and would be prolonging suffering. The family wished to make her as comfortable as possible moving forward.

## 2025-03-04 NOTE — H&P ADULT - HISTORY OF PRESENT ILLNESS
89 y.o. female PMH of HTN, HLD, PCI sp 3 stents in 2007 Afib on Eliquis, breast ca in remission since 1992 , recurrent UTIs, presenting to the ED brought in by daughter for shortness of breath since 3am. Daughter states she lives with her and heard her wake up from coughing. She states she went to check on her and noted her to have difficulty breathing. Daughter reports patient is not oriented at baseline. Denies any known fevers, n/v/d, sick contacts. Denies any recent hospital admissions.Ne    1 week ago had a fever 2ry to UTI, finished a course of ciprofloxacin  allergic to pencillin, sulfa, levofloxacin and opiates  in the ED was satting 89% on non rebreather, placed on BPAP PEEP 6 IPAP 12 50% sating at 99%   Bedside echo showed positive McConnells sign, no signs of volumer overload, recieved 1 liter bolus  Labs showing Multisystem organ failure with ABRAHAN Cr 4 (family denies any renal history), ALT/AST 85/85, elevated INR (2ry to eliquis)    VBG 7.2, PCO2 44 HCO3 17 Metabolic acidosis with underlying resp acidosis   89 y.o. female PMH of HTN, HLD, PCI sp 3 stents in 2007 Afib on Eliquis, breast ca in remission since 1992 , recurrent UTIs, presenting to the ED brought in by daughter for shortness of breath since 3am. Daughter states she lives with her and heard her wake up from coughing. She states she went to check on her and noted her to have difficulty breathing. Daughter reports patient is not oriented at baseline. Denies any known fevers, n/v/d, sick contacts. Denies any recent hospital admissions.Ne    1 week ago had a fever 2ry to UTI, finished a course of ciprofloxacin  allergic to pencillin, sulfa, levofloxacin and opiates  in the ED was satting 89% on non rebreather, placed on BPAP PEEP 6 IPAP 12 50% sating at 99%   Bedside echo showed positive McConnells sign, no signs of volumer overload, recieved 1 liter bolus  Labs showing Multisystem organ failure with ABRAHAN Cr 4 (family denies any renal history), ALT/AST 85/85, elevated INR (2ry to eliquis)    VBG 7.2, PCO2 44 HCO3 17 Metabolic acidosis with underlying resp acidosis    Patient made CMO on admission

## 2025-03-04 NOTE — ED PROVIDER NOTE - CLINICAL SUMMARY MEDICAL DECISION MAKING FREE TEXT BOX
89 yo F, hx of HTn, HLD, afib on eliquis, remote hx of breast ca, recurrent UTI, baseline bedbound with limited ability to take care of ADLs BIBEMS for acute dyspnea - sx began around 3am, at this time patient was reportedly unresponsive with agonal breathing. EMS noted patient to be responsive, tachypneic and hypoxic.     On evaluation patient markedly hypoxic on NRB - 89% - rapidly decreasing when briefly removed to transition to BiPAP, has tachypnea and increased work of breathing, afib, pitting LE edema, responds but nonsensically to verbal and tactile stimulation.    Patient's daughter is caregiver at bedside, wants patient to be DNR/DNI with limited intervention.    As patient has no significant findings on lung exam and bedside US does not demonstrate fluid overload, concern for PE as etiology of hypoxia. Bedside cardiac US suggestive of Tinsley's sign. Patient is unable to go to CT at this time, so after shared decision making, empiric heparin drip was initiated.     Labs further reviewed -- suggestive of acute renal failure with hepatorenal syndrome. Trop and BNP elevated -- likely due to renal failure with possible concomitant PE.     Case was discussed with ICU fellow, patient accepted to SDU. Family aware of findings/lab results, unsure if they would pursue dialysis but likely not.

## 2025-03-04 NOTE — H&P ADULT - NSHPPHYSICALEXAM_GEN_ALL_CORE
T(C): --  HR: 80 (03-04-25 @ 07:09) (80 - 89)  BP: 136/91 (03-04-25 @ 05:41) (136/91 - 136/91)  RR: 30 (03-04-25 @ 05:41) (30 - 30)  SpO2: 96% (03-04-25 @ 07:09) (96% - 96%)    CONSTITUTIONAL: frail unresponsive on BPAP     RESP: tachypneic BPAP no adv sounds  CV: RRR, +S1S2, no MRG; no JVD; no peripheral edema  GI: Soft, NT, ND, no rebound, no guarding; no palpable masses; no hepatosplenomegaly; no hernia palpatede  SKIN: purpura /eccyhmosis rash  NEURO: AOx1

## 2025-03-04 NOTE — CONSULT NOTE ADULT - SUBJECTIVE AND OBJECTIVE BOX
HPI: 89F with PMH of HTN, HLD, CAD s/p PCI, AF on eliquis, breast cancer in remission, recurrent UTIs, here with dyspnea, and found to have acute hypoxic and hypercarbic respiratory failure, as well as transaminitis, ABRAHAN, and liver lobe mass on CTA (no PE noted). Family decided on CMO, palliative c/s for symptom management.          PERTINENT PM/SXH:   Breast cancer    Cataract    Stented coronary artery    Atrial fibrillation      H/O total knee replacement, left    S/P lumpectomy, right breast      FAMILY HISTORY:  no pertinent family history      SOCIAL HISTORY:   Significant other/partner[ ]  Children[ ]  Zoroastrianism/Spirituality:  Substance hx:  [ ]   Tobacco hx:  [ ]   Alcohol hx: [ ]   Living Situation: [ ]Home  [ ]Long term care  [ ]Rehab [ ]Other  Home Services: [ ] HHA [ ] Visting RN [ ] Hospice  Occupation:  Home Opioid hx:  [ ] Y [ ] N [ ] I-Stop Reference No:    ADVANCE DIRECTIVES:    [ ] Full Code [ ] DNR  MOLST  [ ]  Living Will  [ ]   DECISION MAKER(s):  [ ] Health Care Proxy(s)  [ ] Surrogate(s)  [ ] Guardian           Name(s): Phone Number(s):    BASELINE (I)ADL(s) (prior to admission):  [place]: [ ]Total  [ ] Moderate [ ]Dependent  Palliative Performance Status Version 2:         %    http://npcrc.org/files/news/palliative_performance_scale_ppsv2.pdf    Allergies    opioid-like analgesics (Unknown)  Levaquin (Unknown)  sulfa drugs (Hives)  penicillin (Hives)    Intolerances    MEDICATIONS  (STANDING):    MEDICATIONS  (PRN):  glycopyrrolate Injectable 0.4 milliGRAM(s) IV Push four times a day PRN Secretions  HYDROmorphone  Injectable 0.5 milliGRAM(s) IV Push every 2 hours PRN Moderate pain (4-6), Severe pain (7-10), Respiratory rate greater than 22      PRESENT SYMPTOMS: [ ]Unable to obtain due to poor mentation   Source if other than patient:  [ ]Family   [ ]Team   [ X]All review of systems negative including pain and dyspnea unless noted below    All components of pain assessment below addressed. Blank spaces indicate that the patient did/could not complete the assessment.  Pain: [ ]yes [ ]no  QOL impact -   Location -                    Aggravating factors -  Quality -  Radiation -  Timing-  Severity (0-10 scale):  Minimal acceptable level (0-10 scale):     CPOT:    https://www.[facility].org/getattachment/[email or web address]/Critical-Care-Pain-Observation-Tool-(CPOT)    PAIN AD Score:   http://geriatrictoolkit.[facility]/cog/painad.pdf (press ctrl +  left click to view)    Dyspnea:                           [ ]None[ ]Mild [ ]Moderate [ ]Severe     Respiratory Distress Observation Scale (RDOS):   A score of 0 to 2 signifies little or no respiratory distress, 3 signifies mild distress, scores 4 to 6 indicate moderate distress, and scores greater than 7 signify severe distress  https://www.[facility].ca/sites/default/files/PDFS/706440-zxwykjxejej-hvclbyct-tzpshvambyq-hhyfr.pdf    Anxiety:                             [ ]None[ ]Mild [ ]Moderate [ ]Severe   Fatigue:                             [ ]None[ ]Mild [ ]Moderate [ ]Severe   Nausea:                             [ ]None[ ]Mild [ ]Moderate [ ]Severe   Loss of appetite:              [ ]None[ ]Mild [ ]Moderate [ ]Severe   Constipation:                    [ ]None[ ]Mild [ ]Moderate [ ]Severe    Other Symptoms:      Palliative Performance Status Version 2:         %    http://npcrc.org/files/news/palliative_performance_scale_ppsv2.pdf  PHYSICAL EXAM:  Vital Signs Last 24 Hrs  T(C): 36.9 (04 Mar 2025 07:30), Max: 36.9 (04 Mar 2025 07:30)  T(F): 98.4 (04 Mar 2025 07:30), Max: 98.4 (04 Mar 2025 07:30)  HR: 68 (04 Mar 2025 09:00) (68 - 89)  BP: 60/35 (04 Mar 2025 09:00) (52/31 - 136/91)  BP(mean): 41 (04 Mar 2025 08:45) (38 - 67)  RR: 22 (04 Mar 2025 09:00) (22 - 30)  SpO2: 99% (04 Mar 2025 09:00) (96% - 99%)    Parameters below as of 04 Mar 2025 09:00  Patient On (Oxygen Delivery Method): BiPAP 12/6    O2 Concentration (%): 50 I&O's Summary      GENERAL:  [X ] No acute distress [ ]Lethargic  [ ]Unarousable  [ ]Verbal  [ ]Non-Verbal [ ]Cachexia    BEHAVIORAL/PSYCH:  [ ]Alert and Oriented x  [ ] Anxiety [ ] Delirium [ ] Agitation [X ] Calm   EYES: [ ] No scleral icterus [ ] Scleral icterus [ ] Closed  ENMT:  [ ]Dry mouth  [ ]No external oral lesions [ X] No external ear or nose lesions  CARDIOVASCULAR:  [ ]Regular [ ]Irregular [ ]Tachy [ ]Not Tachy  [ ]Madi [ ] Edema [ ] No edema  PULMONARY:  [ ]Tachypnea  [ ]Audible excessive secretions [X ] No labored breathing [ ] labored breathing  GASTROINTESTINAL: [ ]Soft  [ ]Distended  [ X]Not distended [ ]Non tender [ ]Tender  MUSCULOSKELETAL: [ ]No clubbing [ ] clubbing  [ X] No cyanosis [ ] cyanosis  NEUROLOGIC: [ ]No focal deficits  [ ]Follows commands  [ ]Does not follow commands  [ ]Cognitive impairment  [ ]Dysphagia  [ ]Dysarthria  [ ]Paresis   SKIN: [ ] Jaundiced [X ] Non-jaundiced [ ]Rash [ ]No Rash [ ] Warm [ ] Dry  MISC/LINES: [ ] ET tube [ ] Trach [ ]NGT/OGT [ ]PEG [ ]Suarez    CRITICAL CARE:  [ ] Shock Present  [ ]Septic [ ]Cardiogenic [ ]Neurologic [ ]Hypovolemic  [ ]  Vasopressors [ ]  Inotropes   [ ]Respiratory failure present [ ]Mechanical ventilation [ ]Non-invasive ventilatory support [ ]High flow  [ ]Acute  [ ]Chronic [ ]Hypoxic  [ ]Hypercarbic [ ]Other  [ ]Other organ failure     LABS: reviewed by me, notable for:                         12.1   17.97 )-----------( 151      ( 04 Mar 2025 05:50 )             36.6   03-04    140  |  100  |  139[HH]  ----------------------------<  122[H]  5.3[H]   |  16[L]  |  4.0[H]    Ca    8.7      04 Mar 2025 05:50    TPro  5.6[L]  /  Alb  2.8[L]  /  TBili  0.5  /  DBili  x   /  AST  85[H]  /  ALT  85[H]  /  AlkPhos  307[H]  03-04  PT/INR - ( 04 Mar 2025 06:10 )   PT: 19.30 sec;   INR: 1.62 ratio         PTT - ( 04 Mar 2025 06:10 )  PTT:34.1 sec    Urinalysis Basic - ( 04 Mar 2025 05:50 )    Color: x / Appearance: x / SG: x / pH: x  Gluc: 122 mg/dL / Ketone: x  / Bili: x / Urobili: x   Blood: x / Protein: x / Nitrite: x   Leuk Esterase: x / RBC: x / WBC x   Sq Epi: x / Non Sq Epi: x / Bacteria: x      RADIOLOGY & ADDITIONAL STUDIES: CXR personally reviewed by me:    PROTEIN CALORIE MALNUTRITION PRESENT: [ ]mild [ ]moderate [ ]severe [ ]underweight [ ]morbid obesity  https://www.andeal.org/vault/2440/web/files/ONC/Table_Clinical%20Characteristics%20to%20Document%20Malnutrition-White%20JV%20et%20al%202012.pdf      Weight (kg): 70 (03-04-25 @ 06:18)    [ ]PPSV2 < or = to 30% [ ]significant weight loss  [ ]poor nutritional intake  [ ]anasarca      [ ]Artificial Nutrition          Palliative Care Spiritual/Emotional Screening Tool Question  Severity (0-4):                    OR                    [X ] Unable to determine/NA  Score of 2 or greater indicates recommendation of Chaplaincy referral  Chaplaincy Referral: [ ] Yes [ ] Refused [ ] Following     Caregiver [place]:  [ ] Yes [ ] No    OR    [x ] Unable to determine. Will assess at later time if appropriate.  Social Work Referral [ ]  Patient and Family Centered Care Referral [ ]    Anticipatory Grief Present: [ ] Yes [ ] No    OR     [ x] Unable to determine. Will assess at later time if appropriate.  Social Work Referral [ ]  Patient and Family Centered Care Referral [ ]    REFERRALS:   [ ]Chaplaincy  [ ]Hospice  [ ]Child Life  [ ]Social Work  [ ]Case management [ ]Holistic Therapy     Palliative care education provided to patient and/or family    Goals of Care Document:     ______________  Warren Gavin MD  Palliative Medicine  [facility]   of Geriatric and Palliative Medicine  [phone]   HPI: 89F with PMH of HTN, HLD, CAD s/p PCI, AF on eliquis, breast cancer in remission, recurrent UTIs, here with dyspnea, and found to have acute hypoxic and hypercarbic respiratory failure, as well as transaminitis, ABRAHAN, and liver lobe mass on CTA (no PE noted). Family decided on CMO, palliative c/s for symptom management.    PERTINENT PM/SXH:   Breast cancer    Cataract    Stented coronary artery    Atrial fibrillation      H/O total knee replacement, left    S/P lumpectomy, right breast      FAMILY HISTORY:  no pertinent family history      SOCIAL HISTORY:   Significant other/partner[ ]  Children[X ]  Caodaism/Spirituality:  Substance hx:  [ ]   Tobacco hx:  [ ]   Alcohol hx: [ ]   Living Situation: [ ]Home  [ ]Long term care  [ ]Rehab [ ]Other  Home Services: [ ] HHA [ ] Visting RN [ ] Hospice  Occupation:  Home Opioid hx:  [ ] Y [ ] N [X ] I-Stop Reference No: [id number]    ADVANCE DIRECTIVES:    [ ] Full Code [ X] DNR  MOLST  [ X]  Living Will  [ ]   DECISION MAKER(s):  [ ] Health Care Proxy(s)  [ ] Surrogate(s)  [ ] Guardian           Name(s): Phone Number(s): daughter, son    BASELINE (I)ADL(s) (prior to admission):  [place]: [ ]Total  [ ] Moderate [ ]Dependent  Palliative Performance Status Version 2:         %    http://npcrc.org/files/news/palliative_performance_scale_ppsv2.pdf    Allergies    opioid-like analgesics (Unknown)  Levaquin (Unknown)  sulfa drugs (Hives)  penicillin (Hives)    Intolerances    MEDICATIONS  (STANDING):    MEDICATIONS  (PRN):  glycopyrrolate Injectable 0.4 milliGRAM(s) IV Push four times a day PRN Secretions  HYDROmorphone  Injectable 0.5 milliGRAM(s) IV Push every 2 hours PRN Moderate pain (4-6), Severe pain (7-10), Respiratory rate greater than 22      PRESENT SYMPTOMS: [X ]Unable to obtain due to poor mentation   Source if other than patient:  [ ]Family   [ ]Team   [ X]All review of systems negative including pain and dyspnea unless noted below    All components of pain assessment below addressed. Blank spaces indicate that the patient did/could not complete the assessment.  Pain: [ ]yes [ ]no  QOL impact -   Location -                    Aggravating factors -  Quality -  Radiation -  Timing-  Severity (0-10 scale):  Minimal acceptable level (0-10 scale):     CPOT:    https://www.[facility].org/getattachment/[email or web address]/Critical-Care-Pain-Observation-Tool-(CPOT)    PAIN AD Score: 0  http://geriatrictoolkit.[facility]/cog/painad.pdf (press ctrl +  left click to view)    Dyspnea:                           [ ]None[ ]Mild [ ]Moderate [ ]Severe     Respiratory Distress Observation Scale (RDOS): 1  A score of 0 to 2 signifies little or no respiratory distress, 3 signifies mild distress, scores 4 to 6 indicate moderate distress, and scores greater than 7 signify severe distress  https://www.[facility].ca/sites/default/files/PDFS/510739-myzviahfqng-ouhqxcxi-crmyegmdbsm-jlziv.pdf    Anxiety:                             [ ]None[ ]Mild [ ]Moderate [ ]Severe   Fatigue:                             [ ]None[ ]Mild [ ]Moderate [ ]Severe   Nausea:                             [ ]None[ ]Mild [ ]Moderate [ ]Severe   Loss of appetite:              [ ]None[ ]Mild [ ]Moderate [ ]Severe   Constipation:                    [ ]None[ ]Mild [ ]Moderate [ ]Severe    Other Symptoms:      Palliative Performance Status Version 2:         %    http://[facility].org/files/news/palliative_performance_scale_ppsv2.pdf  PHYSICAL EXAM:  Vital Signs Last 24 Hrs  T(C): 36.9 (04 Mar 2025 07:30), Max: 36.9 (04 Mar 2025 07:30)  T(F): 98.4 (04 Mar 2025 07:30), Max: 98.4 (04 Mar 2025 07:30)  HR: 68 (04 Mar 2025 09:00) (68 - 89)  BP: 60/35 (04 Mar 2025 09:00) (52/31 - 136/91)  BP(mean): 41 (04 Mar 2025 08:45) (38 - 67)  RR: 22 (04 Mar 2025 09:00) (22 - 30)  SpO2: 99% (04 Mar 2025 09:00) (96% - 99%)    Parameters below as of 04 Mar 2025 09:00  Patient On (Oxygen Delivery Method): BiPAP 12/6    O2 Concentration (%): 50 I&O's Summary      GENERAL:  [X ] No acute distress [ ]Lethargic  [ ]Unarousable  [ ]Verbal  [ ]Non-Verbal [ ]Cachexia    BEHAVIORAL/PSYCH:  [ ]Alert and Oriented x  [ ] Anxiety [ ] Delirium [ ] Agitation [X ] Calm   EYES: [ ] No scleral icterus [ ] Scleral icterus [X ] Closed  ENMT:  [ ]Dry mouth  [ ]No external oral lesions [ X] No external ear or nose lesions  CARDIOVASCULAR:  [ ]Regular [ ]Irregular [ ]Tachy [ ]Not Tachy  [ ]Madi [ ] Edema [ ] No edema  PULMONARY:  [ ]Tachypnea  [ ]Audible excessive secretions [X ] No labored breathing [ ] labored breathing  GASTROINTESTINAL: [ ]Soft  [ ]Distended  [ X]Not distended [ ]Non tender [ ]Tender  MUSCULOSKELETAL: [ ]No clubbing [ ] clubbing  [ X] No cyanosis [ ] cyanosis  NEUROLOGIC: [ ]No focal deficits  [ ]Follows commands  [ ]Does not follow commands  [X ]Cognitive impairment  [ ]Dysphagia  [ ]Dysarthria  [ ]Paresis   SKIN: [ ] Jaundiced [X ] Non-jaundiced [ ]Rash [ ]No Rash [ ] Warm [ ] Dry  MISC/LINES: [ ] ET tube [ ] Trach [ ]NGT/OGT [ ]PEG [ ]Suarez    CRITICAL CARE:  [ ] Shock Present  [ ]Septic [ ]Cardiogenic [ ]Neurologic [ ]Hypovolemic  [ ]  Vasopressors [ ]  Inotropes   [ ]Respiratory failure present [ ]Mechanical ventilation [ ]Non-invasive ventilatory support [ ]High flow  [ ]Acute  [ ]Chronic [ ]Hypoxic  [ ]Hypercarbic [ ]Other  [ ]Other organ failure     LABS: reviewed by me, notable for: CBC with leukocytosis, BMP with elevated SCr, UA WNL                        12.1   17.97 )-----------( 151      ( 04 Mar 2025 05:50 )             36.6   03-04    140  |  100  |  139[HH]  ----------------------------<  122[H]  5.3[H]   |  16[L]  |  4.0[H]    Ca    8.7      04 Mar 2025 05:50    TPro  5.6[L]  /  Alb  2.8[L]  /  TBili  0.5  /  DBili  x   /  AST  85[H]  /  ALT  85[H]  /  AlkPhos  307[H]  03-04  PT/INR - ( 04 Mar 2025 06:10 )   PT: 19.30 sec;   INR: 1.62 ratio         PTT - ( 04 Mar 2025 06:10 )  PTT:34.1 sec    Urinalysis Basic - ( 04 Mar 2025 05:50 )    Color: x / Appearance: x / SG: x / pH: x  Gluc: 122 mg/dL / Ketone: x  / Bili: x / Urobili: x   Blood: x / Protein: x / Nitrite: x   Leuk Esterase: x / RBC: x / WBC x   Sq Epi: x / Non Sq Epi: x / Bacteria: x      RADIOLOGY & ADDITIONAL STUDIES: CXR personally reviewed by me: no opacities noted    PROTEIN CALORIE MALNUTRITION PRESENT: [ ]mild [ ]moderate [ ]severe [ ]underweight [ ]morbid obesity  https://www.andeal.org/vault/2440/web/files/ONC/Table_Clinical%20Characteristics%20to%20Document%20Malnutrition-White%20JV%20et%20al%202012.pdf      Weight (kg): 70 (03-04-25 @ 06:18)    [ ]PPSV2 < or = to 30% [ ]significant weight loss  [ ]poor nutritional intake  [ ]anasarca      [ ]Artificial Nutrition          Palliative Care Spiritual/Emotional Screening Tool Question  Severity (0-4):                    OR                    [X ] Unable to determine/NA  Score of 2 or greater indicates recommendation of Chaplaincy referral  Chaplaincy Referral: [ ] Yes [ ] Refused [ ] Following     Caregiver [place]:  [ ] Yes [ ] No    OR    [x ] Unable to determine. Will assess at later time if appropriate.  Social Work Referral [ ]  Patient and Family Centered Care Referral [ ]    Anticipatory Grief Present: [ ] Yes [ ] No    OR     [ x] Unable to determine. Will assess at later time if appropriate.  Social Work Referral [ ]  Patient and Family Centered Care Referral [ ]    REFERRALS:   [ ]Chaplaincy  [ ]Hospice  [ ]Child Life  [ ]Social Work  [ ]Case management [ ]Holistic Therapy     Palliative care education provided to patient and/or family    Goals of Care Document:     ______________  Warren Gavin MD  Palliative Medicine  [facility]   of Geriatric and Palliative Medicine  [phone]

## 2025-03-04 NOTE — ED ADULT NURSE NOTE - NSFALLHARMRISKINTERV_ED_ALL_ED

## 2025-03-04 NOTE — CONSULT NOTE ADULT - ASSESSMENT
89F with PMH of HTN, HLD, CAD s/p PCI, AF on eliquis, breast cancer in remission, recurrent UTIs, here with dyspnea, and found to have acute hypoxic and hypercarbic respiratory failure, as well as transaminitis, ABRAHAN, and liver lobe mass on CTA (no PE noted). Family decided on CMO, palliative c/s for symptom management. 89F with PMH of HTN, HLD, CAD s/p PCI, AF on eliquis, breast cancer in remission, recurrent UTIs, here with dyspnea, and found to have acute hypoxic and hypercarbic respiratory failure, as well as transaminitis, ABRAHAN, and liver lobe mass on CTA (no PE noted). Family decided on CMO, palliative c/s for symptom management.    - family decided on CMO  - agree with dilaudid 0.5mg IV Q2 PRN  - agree with robinul 0.5mg IV Q2 PRN  - add ativan 0.5mg IV Q2 PRN agitation, anxiety, dyspnea  - d/w family, they are open to stopping BiPAP and focusing on symptom management, d/w team  - completed MOLST  - will follow    ______________  Warren Gavin MD  Palliative Medicine  [facility]   of Geriatric and Palliative Medicine  [phone]

## 2025-03-04 NOTE — GOALS OF CARE CONVERSATION - ADVANCED CARE PLANNING - CONVERSATION DETAILS
Pulm Fellow discussed with family at bedside to grave prognosis the very low blood pressure with the multiple co morbidities  family agreed on not doing any interventions and keep the patient CMO only

## 2025-03-04 NOTE — CONSULT NOTE ADULT - SUBJECTIVE AND OBJECTIVE BOX
NEPHROLOGY CONSULTATION NOTE    Patient is a 89 yo Female with PMH of HTN, HLD, Afib on Eliquis, HO breast cancer, and recurrent UTIs who presented for SOB since 3am on day of presentation. Daughter states that she lives with Patient and heard her wake up from coughing and noted her to have difficulty breathing. Per daughter, Patient is confused at baseline, but has been increasingly confused over the past 2 weeks. Patient was also recently treated for a UTI with Ciprofloxacin 500 mg BID, completed on 3/02/25. Patient takes daily Advil, x1 pill unknown dose. Denies fevers, N/V/D, sick contacts.    Nephrology Hx: Patient has no known history of kidney disease, does not follow with outpatient nephrologist. Per outpatient labs on 1/22/25, Patient has baseline Cr 1.3, GFR 37.    PAST MEDICAL & SURGICAL HISTORY:  Breast cancer      Cataract      Stented coronary artery      Atrial fibrillation      H/O total knee replacement, left      S/P lumpectomy, right breast        Allergies:  opioid-like analgesics (Unknown)  Levaquin (Unknown)  sulfa drugs (Hives)  penicillin (Hives)    Home Medications Reviewed  Hospital Medications:   MEDICATIONS  (STANDING):    SOCIAL HISTORY:  Denies ETOH, Smoking,   FAMILY HISTORY:    DNR/DNR      REVIEW OF SYSTEMS:  CONSTITUTIONAL: Minimally responsive to touch only  RESPIRATORY: Shortness of breath; No cough, wheezing, hemoptysis  CARDIOVASCULAR: No chest pain or palpitations.  GASTROINTESTINAL: No abdominal or epigastric pain. No nausea, vomiting, or hematemesis; No diarrhea or constipation  GENITOURINARY: No dysuria, frequency, foamy urine, urinary urgency, incontinence or hematuria  NEUROLOGICAL: No numbness or weakness  SKIN: No itching, burning, rashes, or lesions   VASCULAR: bilateral lower extremity edema  All other review of systems is negative unless indicated above.    VITALS:  Vital Signs Last 24 Hrs  T(C): 36.9 (04 Mar 2025 07:30), Max: 36.9 (04 Mar 2025 07:30)  T(F): 98.4 (04 Mar 2025 07:30), Max: 98.4 (04 Mar 2025 07:30)  HR: 74 (04 Mar 2025 08:30) (74 - 89)  BP: 52/31 (04 Mar 2025 08:30) (52/31 - 136/91)  BP(mean): --  RR: 28 (04 Mar 2025 07:30) (28 - 30)  SpO2: 97% (04 Mar 2025 07:30) (96% - 97%)    Parameters below as of 04 Mar 2025 07:30  Patient On (Oxygen Delivery Method): BiPAP 12/6    O2 Concentration (%): 50      Weight (kg): 70 (03-04 @ 06:18)  PHYSICAL EXAM:  Constitutional: Ill-appearing  Respiratory: Tachypneic, lungs CTAB  Cardiovascular: S1, S2, RRR  Gastrointestinal: BS+, soft, NT/ND  Extremities: b/l nonpitting LE edema. No cyanosis or clubbing. No peripheral edema  Neurological: unable to assess given Patient's mental status  Psychiatric: Normal mood, normal affect  : No CVA tenderness. Suarez  Skin: No rashes  Vascular Access:    LABS:  03-04    140  |  100  |  139[HH]  ----------------------------<  122[H]  5.3[H]   |  16[L]  |  4.0[H]    Ca    8.7      04 Mar 2025 05:50    TPro  5.6[L]  /  Alb  2.8[L]  /  TBili  0.5  /  DBili      /  AST  85[H]  /  ALT  85[H]  /  AlkPhos  307[H]  03-04    Creatinine Trend: 4.0 <--                        12.1   17.97 )-----------( 151      ( 04 Mar 2025 05:50 )             36.6     Urine Studies:  Urinalysis Basic - ( 04 Mar 2025 05:50 )    Color:  / Appearance:  / SG:  / pH:   Gluc: 122 mg/dL / Ketone:   / Bili:  / Urobili:    Blood:  / Protein:  / Nitrite:    Leuk Esterase:  / RBC:  / WBC    Sq Epi:  / Non Sq Epi:  / Bacteria:                 RADIOLOGY & ADDITIONAL STUDIES:    CT ANGIO CHEST  PROCEDURE DATE: 3/04/25    FINDINGS:  PULMONARY EMBOLUS: No evidence of acute pulmonary embolism.  LUNGS, PLEURA, AIRWAYS: No lobar consolidation, mass, effusion, or pneumothorax. No evidence of central endobronchial obstruction. No bronchiectasis or honeycombing. No suspicious pulmonary nodule. Elevated right hemidiaphragm. Bilateral subsegmental atelectasis.  THORACIC NODES: No mediastinal, hilar, supraclavicular, or axillary lymphadenopathy.  MEDIASTINUM/GREAT VESSELS: No pericardial effusion. Heart size is within normal limits. The aorta and main pulmonary artery are of normal caliber. Reflux of contrast to IVC. Aortic and coronary artery calcification.  BONES/SOFT TISSUES: Degenerative change of spine.  VISUALIZED UPPER ABDOMEN: Questionable new 6 cm right hepatic lobe mass on examination limited examination with streak artifact (patient arms down and gantry; series 4, image 165). Consider dedicated abdominal imaging for further evaluation.    IMPRESSION:  No evidence of acute pulmonary embolism.  Elevated right hemidiaphragm. Bilateral subsegmental atelectasis.  Questionable new 6 cm right hepatic lobe mass on examination limited examination with streak artifact (patient arms down and gantry). Consider dedicated abdominal imaging for further evaluation.

## 2025-03-04 NOTE — ED PROVIDER NOTE - IV ALTEPLASE DOOR HIDDEN

## 2025-03-04 NOTE — CONSULT NOTE ADULT - ASSESSMENT
IMPRESSION:    # Acute Hypoxic mild hypercapnic respiratory failure  #shock septic vs hypovolemic   #elevated trop   #HAGMA - lactic acidosis  #Acute renal failure  #CAD Hx of PCI s/p 3 stents in 2007  #A fib on Eliquis  # Breast Ca in remission   # Recurrent UTI   #dementia         PLAN:    CNS: A baseline. Avoid CNS depressants     HEENT: Oral care    PULMONARY:  HOB @ 45 degrees.  Aspiration precautions . NIV as tolerated.     CARDIOVASCULAR: Severe Hypotension to the 50s, with lactic acidosis- Family agreed on no escalation of care. Maintain comfort measures     GI:   Comfort feeding.  Bowel regimen. Comfort measures     RENAL: Poor candidate for HD, - comfort measure only. Bladder scans.     INFECTIOUS DISEASE: Hold off Abx. Comfort measures     HEMATOLOGICAL:  DVT prophylaxis. no lab     ENDOCRINE: Sugars acceptable. Maintain comfort measures     MUSCULOSKELETAL: bed rest and comfort measures            IMPRESSION:    Acute Hypoxic respiratory failure  Shock   Elevated trop   HAGMA - lactic acidosis  Acute renal failure  HO CAD Hx of PCI s/p 3 stents in 2007  A fib on Eliquis  HO Breast Ca in remission   HO Recurrent UTI   HO dementia       PLAN:    CNS:  Avoid over sedation.  Assure comfort     HEENT: Oral care    PULMONARY:  HOB @ 45 degrees.  Aspiration precautions . NIV as tolerated.  Wean o2 as tolerated.  Keep O2 sats 92-96%    CARDIOVASCULAR: Shock  Family agreed on no escalation of care. Maintain comfort measures     GI:   Comfort feeding when more awake.  Bowel regimen. Comfort measures     RENAL: Poor candidate for HD, - comfort measure only. Bladder scans.     INFECTIOUS DISEASE: Hold off Abx. Comfort measures     HEMATOLOGICAL:  DVT prophylaxis. no lab     ENDOCRINE: Sugars acceptable. Maintain comfort measures     MUSCULOSKELETAL: bed rest and comfort measures     Admit to floor

## 2025-03-05 NOTE — PROGRESS NOTE ADULT - ASSESSMENT
IMPRESSION:    Acute Hypoxic respiratory failure  Shock   Elevated trop   HAGMA - lactic acidosis  Acute renal failure  HO CAD Hx of PCI s/p 3 stents in 2007  A fib on Eliquis  HO Breast Ca in remission   HO Recurrent UTI   HO dementia       PLAN:    - Opiates for resp comfort  - CMO  - Palliative care fup    FLOOR

## 2025-03-05 NOTE — PROGRESS NOTE ADULT - ASSESSMENT
89F with PMH of HTN, HLD, CAD s/p PCI, AF on eliquis, breast cancer in remission, recurrent UTIs, here with dyspnea, and found to have acute hypoxic and hypercarbic respiratory failure, as well as transaminitis, ABRAHAN, and liver lobe mass on CTA (no PE noted). Family decided on CMO, palliative c/s for symptom management.    - CMO  - c/w dilaudid 0.5mg IV Q2 PRN  - c/w robinul 0.5mg IV Q2 PRN  - c/w ativan 0.5mg IV Q2 PRN agitation, anxiety, dyspnea  - MOLST completed  - family wants to c/w NIV for now, awaiting grandson visiting patient  - will follow    ______________  Warren Gavin MD  Palliative Medicine  [facility]   of Geriatric and Palliative Medicine  [phone]

## 2025-03-05 NOTE — PROGRESS NOTE ADULT - ASSESSMENT
89 y.o. female PMH of HTN, HLD, PCI sp 3 stents in 2007 Afib on Eliquis, breast ca in remission since 1992 , recurrent UTIs, presenting to the ED brought in by daughter for shortness of breath    #Acute hypoxic hypercarbic resp failure, rule out PE  #MSOF: ABRAHAN, liver insult, hypotension  #Afib  #CAD  #CMO    -follow up pallitaive care reccs  glycopyrrolate Injectable 0.4 milliGRAM(s) IV Push four times a day PRN Secretions  HYDROmorphone  Injectable 0.5 milliGRAM(s) IV Push every 2 hours PRN Moderate pain (4-6), Severe pain (7-10), Respiratory rate greater than 22  LORazepam   Injectable 0.5 milliGRAM(s) IV Push every 2 hours PRN Anxiety  Very poor prognosis    #Progress Note Handoff  Pending (specify):  CMO  Family discussion: dw daughter at bedside  Disposition: [facility], dgtf  Decision to admit the pt is based on acuity as above

## 2025-03-05 NOTE — PATIENT PROFILE ADULT - FALL HARM RISK - HARM RISK INTERVENTIONS
Assistance with ambulation/Assistance OOB with selected safe patient handling equipment/Communicate Risk of Fall with Harm to all staff/Discuss with provider need for PT consult/Monitor gait and stability/Provide patient with walking aids - walker, cane, crutches/Reinforce activity limits and safety measures with patient and family/Sit up slowly, dangle for a short time, stand at bedside before walking/Tailored Fall Risk Interventions/Visual Cue: Yellow wristband and red socks/Bed in lowest position, wheels locked, appropriate side rails in place/Call bell, personal items and telephone in reach/Instruct patient to call for assistance before getting out of bed or chair/Non-slip footwear when patient is out of bed/[place] to call system/Physically safe environment - no spills, clutter or unnecessary equipment/Purposeful Proactive Rounding/Room/bathroom lighting operational, light cord in reach

## 2025-03-05 NOTE — PROGRESS NOTE ADULT - ASSESSMENT
89 y.o. female PMH of HTN, HLD, PCI sp 3 stents in 2007 Afib on Eliquis, breast ca in remission since 1992 , recurrent UTIs, presenting to the ED brought in by daughter for shortness of breath since 3am. Found to be in multi organ failure. Family elected to make pt CMO.    #Acute hypoxic hypercarbic resp failure, rule out PE  #MSOF: ABRAHAN, liver insult, hypotension  #Afib  #CAD  #CMO    Palliative recs:    - CMO  - c/w dilaudid 0.5mg IV Q2 PRN  - c/w robinul 0.5mg IV Q2 PRN  - c/w ativan 0.5mg IV Q2 PRN agitation, anxiety, dyspnea  - MOLST completed  - family wants to c/w NIV for now, awaiting grandson visiting patient

## 2025-03-06 NOTE — PROGRESS NOTE ADULT - ASSESSMENT
89 y.o. female PMH of HTN, HLD, PCI sp 3 stents in 2007 Afib on Eliquis, breast ca in remission since 1992 , recurrent UTIs, presenting to the ED brought in by daughter for shortness of breath since 3am. Found to be in multi organ failure. Family elected to make pt CMO.    #Acute hypoxic hypercarbic resp failure, rule out PE  #MSOF: ABRAHAN, liver insult, hypotension  #Afib  #CAD  #CMO  - palliative recs appreciated  - c/w dilaudid 0.5mg IV Q2 PRN  - c/w robinul 0.5mg IV Q2 PRN  - c/w ativan 0.5mg IV Q2 PRN agitation, anxiety, dyspnea  - MOLST completed  - family wants to c/w NIV for now, awaiting grandson visiting patient   89 y.o. female PMH of HTN, HLD, PCI sp 3 stents in 2007 Afib on Eliquis, breast ca in remission since 1992 , recurrent UTIs, presenting to the ED brought in by daughter for shortness of breath since 3am. Found to be in multi organ failure. Family elected to make pt CMO.    #Acute hypoxic hypercarbic resp failure, rule out PE  #MSOF: ABRAHAN, liver insult, hypotension  #Afib  #CAD  #CMO  - palliative recs appreciated  - c/w dilaudid 0.5mg IV Q2 PRN  - c/w robinul 0.5mg IV Q2 PRN  - c/w ativan 0.5mg IV Q2 PRN agitation, anxiety, dyspnea  - MOLST completed  - family wants to c/w NIV for now, awaiting grandson visiting patient    #Pending: hospice

## 2025-03-06 NOTE — PROGRESS NOTE ADULT - ASSESSMENT
89F with PMH of HTN, HLD, CAD s/p PCI, AF on eliquis, breast cancer in remission, recurrent UTIs, here with dyspnea, and found to have acute hypoxic and hypercarbic respiratory failure, as well as transaminitis, ABRAHAN, and liver lobe mass on CTA (no PE noted). Family decided on CMO, palliative c/s for symptom management.    - CMO  - c/w dilaudid 0.5mg IV Q2 PRN (used 1/24 hours)  - c/w robinul 0.5mg IV Q2 PRN  - c/w ativan 0.5mg IV Q2 PRN agitation, anxiety, dyspnea (used 1/24 hours)  - MOLST completed  - no further lab draws  - d/w team  - ongoing symptomatic management   - will follow    ______________  Warren Gavin MD  Palliative Medicine  [facility]   of Geriatric and Palliative Medicine  [phone]

## 2025-03-07 NOTE — PROGRESS NOTE ADULT - ATTENDING COMMENTS
89 y.o. female PMH of HTN, HLD, PCI sp 3 stents in 2007 Afib on Eliquis, breast ca in remission since 1992 , recurrent UTIs, presenting to the ED brought in by daughter for shortness of breath    #Acute hypoxic hypercarbic resp failure, rule out PE  #MSOF: ABRAHAN, liver insult, hypotension  #Afib  #CAD  #CMO    -follow up pallitaive care reccs  glycopyrrolate PRN Secretions  HYDROmorphone  Injectable  PRN Pain or Dyspnea  LORazepam   PRN Anxiety  Very poor prognosis    #Progress Note Handoff  Pending (specify):  CMO  Family discussion: dw daughter at bedside  Disposition: [facility], trentontf    HANDOFF: CMO. Hospice referral sent by CM today. f/u.   Patient likely to pass here within 3-4 days.
89 y.o. female PMH of HTN, HLD, PCI sp 3 stents in 2007 Afib on Eliquis, breast ca in remission since 1992 , recurrent UTIs, presenting to the ED brought in by daughter for shortness of breath    #Acute hypoxic hypercarbic resp failure, rule out PE  #MSOF: ABRAHAN, liver insult, hypotension  #Afib  #CAD  #CMO    -follow up pallitaive care reccs  glycopyrrolate PRN Secretions  HYDROmorphone  Injectable  PRN Pain or Dyspnea  LORazepam   PRN Anxiety  Very poor prognosis    #Progress Note Handoff  Pending (specify):  CMO  Family discussion: dw daughter at bedside  Disposition: [facility], trentontf    HANDOFF: CMO. Hospice referral sent by CM today. f/u.   Patient likely to pass here within 3-4 days.

## 2025-03-07 NOTE — PROGRESS NOTE ADULT - PROVIDER SPECIALTY LIST ADULT
Hospitalist
Internal Medicine
Critical Care
Internal Medicine
Internal Medicine
Palliative Care

## 2025-03-07 NOTE — PROGRESS NOTE ADULT - ASSESSMENT
89F with PMH of HTN, HLD, CAD s/p PCI, AF on eliquis, breast cancer in remission, recurrent UTIs, here with dyspnea, and found to have acute hypoxic and hypercarbic respiratory failure, as well as transaminitis, ABRAHAN, and liver lobe mass on CTA (no PE noted). Family decided on CMO, palliative c/s for symptom management.    - CMO  - c/w dilaudid 0.5mg IV Q2 PRN (used 2/24 hours)  - c/w robinul 0.5mg IV Q2 PRN  - c/w ativan 0.5mg IV Q2 PRN agitation, anxiety, dyspnea (used 1/24 hours)  - MOLST completed  - no further lab draws  - possible hospice next week if remains stable  - d/w team  - ongoing symptomatic management   - will follow    ______________  Warren Gavin MD  Palliative Medicine  [facility]   of Geriatric and Palliative Medicine  [phone]

## 2025-03-07 NOTE — PROGRESS NOTE ADULT - SUBJECTIVE AND OBJECTIVE BOX
HPI: 89F with PMH of HTN, HLD, CAD s/p PCI, AF on eliquis, breast cancer in remission, recurrent UTIs, here with dyspnea, and found to have acute hypoxic and hypercarbic respiratory failure, as well as transaminitis, ABRAHAN, and liver lobe mass on CTA (no PE noted). Family decided on CMO, palliative c/s for symptom management.    INTERVAL EVENTS  3/6: patient comfortable, daughter at bedside  3/7: patient comfortable, grandson at bedside    ADVANCE DIRECTIVES:    [ ] Full Code [ X] DNR  MOLST  [ X]  Living Will  [ ]   DECISION MAKER(s):  [ ] Health Care Proxy(s)  [ ] Surrogate(s)  [ ] Guardian           Name(s): Phone Number(s): daughter, son    BASELINE (I)ADL(s) (prior to admission):  [place]: [ ]Total  [ ] Moderate [ ]Dependent  Palliative Performance Status Version 2:         %    http://[facility].org/files/news/palliative_performance_scale_ppsv2.pdf    Allergies    opioid-like analgesics (Unknown)  Levaquin (Unknown)  sulfa drugs (Hives)  penicillin (Hives)    Intolerances    MEDICATIONS  (STANDING):    MEDICATIONS  (PRN):  glycopyrrolate Injectable 0.4 milliGRAM(s) IV Push four times a day PRN Secretions  HYDROmorphone  Injectable 0.5 milliGRAM(s) IV Push every 2 hours PRN Moderate pain (4-6), Severe pain (7-10), Respiratory rate greater than 22  LORazepam   Injectable 0.5 milliGRAM(s) IV Push every 2 hours PRN Anxiety      PRESENT SYMPTOMS: [X]Unable to obtain due to poor mentation   Source if other than patient:  [ ]Family   [ ]Team   [ X]All review of systems negative including pain and dyspnea unless noted below    All components of pain assessment below addressed. Blank spaces indicate that the patient did/could not complete the assessment.  Pain: [ ]yes [ ]no  QOL impact -   Location -                    Aggravating factors -  Quality -  Radiation -  Timing-  Severity (0-10 scale):  Minimal acceptable level (0-10 scale):     CPOT:    https://www.[facility].org/getattachment/[email or web address]/Critical-Care-Pain-Observation-Tool-(CPOT)    PAIN AD Score: 0  http://geriatrictoolkit.[facility]/cog/painad.pdf (press ctrl +  left click to view)    Dyspnea:                           [ ]None[ ]Mild [ ]Moderate [ ]Severe     Respiratory Distress Observation Scale (RDOS): 1  A score of 0 to 2 signifies little or no respiratory distress, 3 signifies mild distress, scores 4 to 6 indicate moderate distress, and scores greater than 7 signify severe distress  https://www.[facility].ca/sites/default/files/PDFS/081274-qyjpfxjymll-mmiaasnt-itdrphlqltf-reycb.pdf    Anxiety:                             [ ]None[ ]Mild [ ]Moderate [ ]Severe   Fatigue:                             [ ]None[ ]Mild [ ]Moderate [ ]Severe   Nausea:                             [ ]None[ ]Mild [ ]Moderate [ ]Severe   Loss of appetite:              [ ]None[ ]Mild [ ]Moderate [ ]Severe   Constipation:                    [ ]None[ ]Mild [ ]Moderate [ ]Severe    Other Symptoms:      Palliative Performance Status Version 2:         %    http://[facility].org/files/news/palliative_performance_scale_ppsv2.pdf  PHYSICAL EXAM:  Vital Signs Last 24 Hrs  T(C): --  T(F): --  HR: --  BP: --  BP(mean): --  RR: --  SpO2: --          GENERAL:  [X ] No acute distress [ ]Lethargic  [ ]Unarousable  [ ]Verbal  [ ]Non-Verbal [ ]Cachexia    BEHAVIORAL/PSYCH:  [ ]Alert and Oriented x  [ ] Anxiety [ ] Delirium [ ] Agitation [X ] Calm   EYES: [ ] No scleral icterus [ ] Scleral icterus [X ] Closed  ENMT:  [ ]Dry mouth  [ ]No external oral lesions [ X] No external ear or nose lesions  CARDIOVASCULAR:  [ ]Regular [ ]Irregular [ ]Tachy [ ]Not Tachy  [ ]Madi [ ] Edema [ ] No edema  PULMONARY:  [ ]Tachypnea  [ ]Audible excessive secretions [X ] No labored breathing [ ] labored breathing  GASTROINTESTINAL: [ ]Soft  [ ]Distended  [ X]Not distended [ ]Non tender [ ]Tender  MUSCULOSKELETAL: [ ]No clubbing [ ] clubbing  [ X] No cyanosis [ ] cyanosis  NEUROLOGIC: [ ]No focal deficits  [ ]Follows commands  [ ]Does not follow commands  [X ]Cognitive impairment  [ ]Dysphagia  [ ]Dysarthria  [ ]Paresis   SKIN: [ ] Jaundiced [X ] Non-jaundiced [ ]Rash [ ]No Rash [ ] Warm [ ] Dry  MISC/LINES: [ ] ET tube [ ] Trach [ ]NGT/OGT [ ]PEG [ ]Suarez    CRITICAL CARE:  [ ] Shock Present  [ ]Septic [ ]Cardiogenic [ ]Neurologic [ ]Hypovolemic  [ ]  Vasopressors [ ]  Inotropes   [ ]Respiratory failure present [ ]Mechanical ventilation [ ]Non-invasive ventilatory support [ ]High flow  [ ]Acute  [ ]Chronic [ ]Hypoxic  [ ]Hypercarbic [ ]Other  [ ]Other organ failure     LABS: CMO, no AM labs      RADIOLOGY & ADDITIONAL STUDIES: CMO - no further imaging    PROTEIN CALORIE MALNUTRITION PRESENT: [ ]mild [ ]moderate [ ]severe [ ]underweight [ ]morbid obesity  https://www.andeal.org/vault/2440/web/files/ONC/Table_Clinical%20Characteristics%20to%20Document%20Malnutrition-White%20JV%20et%20al%481113.pdf      Weight (kg): 70 (03-04-25 @ 06:18)    [ ]PPSV2 < or = to 30% [ ]significant weight loss  [ ]poor nutritional intake  [ ]anasarca      [ ]Artificial Nutrition          Palliative Care Spiritual/Emotional Screening Tool Question  Severity (0-4):                    OR                    [X ] Unable to determine/NA  Score of 2 or greater indicates recommendation of Chaplaincy referral  Chaplaincy Referral: [ ] Yes [ ] Refused [ ] Following     Caregiver [place]:  [ ] Yes [ ] No    OR    [x ] Unable to determine. Will assess at later time if appropriate.  Social Work Referral [ ]  Patient and Family Centered Care Referral [ ]    Anticipatory Grief Present: [ ] Yes [ ] No    OR     [ x] Unable to determine. Will assess at later time if appropriate.  Social Work Referral [ ]  Patient and Family Centered Care Referral [ ]    REFERRALS:   [ ]Chaplaincy  [ ]Hospice  [ ]Child Life  [ ]Social Work  [ ]Case management [ ]Holistic Therapy     Palliative care education provided to patient and/or family    Goals of Care Document:     ______________  Warren Gavin MD  Palliative Medicine  [facility]   of Geriatric and Palliative Medicine  [phone]  
Patient is a 89y old  Female who presents with a chief complaint of SOB (03-05-25)      Pt seen and examined at bedside. unable to get ros          PAST MEDICAL & SURGICAL HISTORY:  Breast cancer    Cataract    Stented coronary artery    Atrial fibrillation    H/O total knee replacement, left    S/P lumpectomy, right breast        VITAL SIGNS (Last 24 hrs):  T(C): --  HR: 56 (03-05-25 @ 01:30) (56 - 65)  BP: 91/52 (03-05-25 @ 01:30) (91/52 - 91/52)  RR: 22 (03-05-25 @ 01:30) (22 - 22)  SpO2: 91% (03-05-25 @ 01:30) (91% - 97%)  Wt(kg): --  Daily     Daily     I&O's Summary      PHYSICAL EXAM:  GENERAL: mild tachypnea   HEAD:  Atraumatic, Normocephalic  EYES: EOMI, PERRLA, conjunctiva and sclera clear  NECK: Supple, No JVD  CHEST/LUNG: Clear to auscultation bilaterally; No wheeze  HEART: Regular rate and rhythm; No murmurs, rubs, or gallops  ABDOMEN: Soft, Nontender, Nondistended; Bowel sounds present  EXTREMITIES:  2+ Peripheral Pulses, No clubbing, cyanosis, or edema  PSYCH: opens eyes    Labs Reviewed  Spoke to patient in regards to abnormal labs.    CBC Full  -  ( 04 Mar 2025 05:50 )  WBC Count : 17.97 K/uL  Hemoglobin : 12.1 g/dL  Hematocrit : 36.6 %  Platelet Count - Automated : 151 K/uL  Mean Cell Volume : 99.5 fL  Mean Cell Hemoglobin : 32.9 pg  Mean Cell Hemoglobin Concentration : 33.1 g/dL  Auto Neutrophil # : x  Auto Lymphocyte # : x  Auto Monocyte # : x  Auto Eosinophil # : x  Auto Basophil # : x  Auto Neutrophil % : x  Auto Lymphocyte % : x  Auto Monocyte % : x  Auto Eosinophil % : x  Auto Basophil % : x    BMP:    03-04 @ 05:50    Blood Urea Nitrogen - 139  Calcium - 8.7  Carbond Dioxide - 16  Chloride - 100  Creatinine - 4.0  Glucose - 122  Potassium - 5.3  Sodium - 140      Hemoglobin A1c -   PT/INR - ( 04 Mar 2025 06:10 )   PT: 19.30 sec;   INR: 1.62 ratio         PTT - ( 04 Mar 2025 06:10 )  PTT:34.1 sec  Urine Culture:        COVID Labs  CRP:      D-Dimer:            Imaging reviewed independently and reviewed read        MEDICATIONS  (STANDING):    MEDICATIONS  (PRN):  glycopyrrolate Injectable 0.4 milliGRAM(s) IV Push four times a day PRN Secretions  HYDROmorphone  Injectable 0.5 milliGRAM(s) IV Push every 2 hours PRN Moderate pain (4-6), Severe pain (7-10), Respiratory rate greater than 22  LORazepam   Injectable 0.5 milliGRAM(s) IV Push every 2 hours PRN Anxiety      
SUBJECTIVE/OVERNIGHT EVENTS  Today is hospital day 2d. This morning patient was seen and examined at bedside, resting comfortably in bed. No acute or major events overnight.    MEDICATIONS  STANDING MEDICATIONS    PRN MEDICATIONS  glycopyrrolate Injectable 0.4 milliGRAM(s) IV Push four times a day PRN  HYDROmorphone  Injectable 0.5 milliGRAM(s) IV Push every 2 hours PRN  LORazepam   Injectable 0.5 milliGRAM(s) IV Push every 2 hours PRN    PHYSICAL EXAM  Pt appears comfortable, on NRB.
Over Night Events: events noted, on NIV, palliative, renal reviewed    PHYSICAL EXAM    ICU Vital Signs Last 24 Hrs  T(C): 36.5 (04 Mar 2025 11:00), Max: 36.9 (04 Mar 2025 07:30)  T(F): 97.7 (04 Mar 2025 11:00), Max: 98.4 (04 Mar 2025 07:30)  HR: 56 (05 Mar 2025 01:30) (56 - 84)  BP: 91/52 (05 Mar 2025 01:30) (52/31 - 132/58)  BP(mean): 69 (05 Mar 2025 01:30) (38 - 69)  RR: 22 (05 Mar 2025 01:30) (22 - 28)  SpO2: 91% (05 Mar 2025 01:30) (91% - 99%)    O2 Parameters below as of 05 Mar 2025 01:30  Patient On (Oxygen Delivery Method): BiPAP/CPAP            General: chronically ill looking  Lungs: Bilateral rhonchi  Cardiovascular: MIRIAM 2.6  Abdomen: Soft, Positive BS  Neurological: Non focal         LABS:                          12.1   17.97 )-----------( 151      ( 04 Mar 2025 05:50 )             36.6                                               03-04    140  |  100  |  139[HH]  ----------------------------<  122[H]  5.3[H]   |  16[L]  |  4.0[H]    Ca    8.7      04 Mar 2025 05:50    TPro  5.6[L]  /  Alb  2.8[L]  /  TBili  0.5  /  DBili  x   /  AST  85[H]  /  ALT  85[H]  /  AlkPhos  307[H]  03-04      PT/INR - ( 04 Mar 2025 06:10 )   PT: 19.30 sec;   INR: 1.62 ratio         PTT - ( 04 Mar 2025 06:10 )  PTT:34.1 sec                                       Urinalysis Basic - ( 04 Mar 2025 05:50 )    Color: x / Appearance: x / SG: x / pH: x  Gluc: 122 mg/dL / Ketone: x  / Bili: x / Urobili: x   Blood: x / Protein: x / Nitrite: x   Leuk Esterase: x / RBC: x / WBC x   Sq Epi: x / Non Sq Epi: x / Bacteria: x                                                  LIVER FUNCTIONS - ( 04 Mar 2025 05:50 )  Alb: 2.8 g/dL / Pro: 5.6 g/dL / ALK PHOS: 307 U/L / ALT: 85 U/L / AST: 85 U/L / GGT: x                                                                                                                                       MEDICATIONS  (STANDING):    MEDICATIONS  (PRN):  glycopyrrolate Injectable 0.4 milliGRAM(s) IV Push four times a day PRN Secretions  HYDROmorphone  Injectable 0.5 milliGRAM(s) IV Push every 2 hours PRN Moderate pain (4-6), Severe pain (7-10), Respiratory rate greater than 22  LORazepam   Injectable 0.5 milliGRAM(s) IV Push every 2 hours PRN Anxiety    
SUBJECTIVE/OVERNIGHT EVENTS  Today is hospital day 1d. This morning patient was seen and examined at bedside, resting comfortably in bed. No acute or major events overnight.    CODE STATUS: CMO    FAMILY COMMUNICATION  Contact date:  Name of person contacted:  Relationship to patient:  Communication details:    MEDICATIONS  STANDING MEDICATIONS    PRN MEDICATIONS  glycopyrrolate Injectable 0.4 milliGRAM(s) IV Push four times a day PRN  HYDROmorphone  Injectable 0.5 milliGRAM(s) IV Push every 2 hours PRN  LORazepam   Injectable 0.5 milliGRAM(s) IV Push every 2 hours PRN    VITALS  T(F): --  HR: 56 (03-05-25 @ 01:30) (56 - 65)  BP: 91/52 (03-05-25 @ 01:30) (91/52 - 91/52)  RR: 22 (03-05-25 @ 01:30) (22 - 22)  SpO2: 91% (03-05-25 @ 01:30) (91% - 97%)    PHYSICAL EXAM- deferred due to CMO status  GENERAL  (  ) NAD, lying in bed comfortably     (  ) obtunded     (  ) lethargic     (  ) somnolent    HEAD  (  ) Atraumatic     (  ) hematoma     (  ) laceration (specify location:       )     NECK  (  ) Supple     (  ) neck stiffness     (  ) nuchal rigidity     (  )  no JVD     (  ) JVD present ( -- cm)    HEART  Rate -->  (  ) normal rate    (  ) bradycardic    (  ) tachycardic  Rhythm -->  (  ) regular    (  ) regularly irregular    (  ) irregularly irregular  Murmurs -->  (  ) normal s1/s2    (  ) systolic murmur    (  ) diastolic murmur    (  ) continuous murmur     (  ) S3 present    (  ) S4 present    LUNGS  (  )Unlabored respirations     (  ) tachypnea  (  ) B/L air entry     (  ) decreased breath sounds in:  (location     )    (  ) no adventitious sound     (  ) crackles     (  ) wheezing      (  ) rhonchi      (specify location:       )  (  ) chest wall tenderness (specify location:       )    ABDOMEN  (  ) Soft     (  ) tense   |   (  ) nondistended     (  ) distended   |   (  ) +BS     (  ) hypoactive bowel sounds     (  ) hyperactive bowel sounds  (  ) nontender     (  ) RUQ tenderness     (  ) RLQ tenderness     (  ) LLQ tenderness     (  ) epigastric tenderness     (  ) diffuse tenderness  (  ) Splenomegaly      (  ) Hepatomegaly      (  ) Jaundice     (  ) ecchymosis     EXTREMITIES  (  ) Normal     (  ) Rash     (  ) ecchymosis     (  ) varicose veins      (  ) pitting edema     (  ) non-pitting edema   (  ) ulceration     (  ) gangrene:     (location:     )    NERVOUS SYSTEM  (  ) A&Ox3     (  ) confused     (  ) lethargic  CN II-XII:     (  ) Intact     (  ) focal deficits  (Specify:     )   Upper extremities:     (  ) strength X/5     (  ) focal deficit (specify:    )  Lower extremities:     (  ) strength  X/5    (  ) focal deficit (specify:    )    SKIN  (  ) No rashes or lesions     (  ) maculopapular rash     (  ) pustules     (  ) vesicles     (  ) ulcer     (  ) ecchymosis     (specify location:     )    (  ) Indwelling Suarez Catheter   Date insterted:    Reason (  ) Critical illness     (  ) urinary retention    (  ) Accurate Ins/Outs Monitoring     (  ) CMO patient    (  ) Central Line  Date inserted:  Location: (  ) Right IJ   (  ) Left IJ   (  ) Right Fem   (  ) Left Fem    (  ) SPC  (  ) pigtail  (  ) PEG tube  (  ) colostomy  (  ) jejunostomy  (  ) U-Dall    LABS             12.1   17.97 )-----------( 151      ( 03-04-25 @ 05:50 )             36.6     140  |  100  |  139  -------------------------<  122   03-04-25 @ 05:50  5.3  |  16  |  4.0    Ca      8.7     03-04-25 @ 05:50    TPro  5.6  /  Alb  2.8  /  TBili  0.5  /  DBili  x   /  AST  85  /  ALT  85  /  AlkPhos  307  /  GGT  x     03-04-25 @ 05:50    PT/INR - ( 03-04-25 @ 06:10 )   PT: 19.30 sec[H];   INR: 1.62 ratio[H]  PTT - ( 03-04-25 @ 06:10 )  PTT:34.1 sec    Pro-Brain Natriuretic Peptide: 9169 pg/mL (03-04-25 @ 05:50)  Troponin T, High Sensitivity Result: 152 ng/L (03-04-25 @ 05:50)    Urinalysis Basic - ( 04 Mar 2025 05:50 )    Color: x / Appearance: x / SG: x / pH: x  Gluc: 122 mg/dL / Ketone: x  / Bili: x / Urobili: x   Blood: x / Protein: x / Nitrite: x   Leuk Esterase: x / RBC: x / WBC x   Sq Epi: x / Non Sq Epi: x / Bacteria: x          IMAGING
HPI: 89F with PMH of HTN, HLD, CAD s/p PCI, AF on eliquis, breast cancer in remission, recurrent UTIs, here with dyspnea, and found to have acute hypoxic and hypercarbic respiratory failure, as well as transaminitis, ABRAHAN, and liver lobe mass on CTA (no PE noted). Family decided on CMO, palliative c/s for symptom management.    PERTINENT PM/SXH:   Breast cancer    Cataract    Stented coronary artery    Atrial fibrillation      H/O total knee replacement, left    S/P lumpectomy, right breast      FAMILY HISTORY:  no pertinent family history      SOCIAL HISTORY:   Significant other/partner[ ]  Children[X ]  Christianity/Spirituality:  Substance hx:  [ ]   Tobacco hx:  [ ]   Alcohol hx: [ ]   Living Situation: [ ]Home  [ ]Long term care  [ ]Rehab [ ]Other  Home Services: [ ] HHA [ ] Visting RN [ ] Hospice  Occupation:  Home Opioid hx:  [ ] Y [ ] N [X ] I-Stop Reference No: [id number]    ADVANCE DIRECTIVES:    [ ] Full Code [ X] DNR  MOLST  [ X]  Living Will  [ ]   DECISION MAKER(s):  [ ] Health Care Proxy(s)  [ ] Surrogate(s)  [ ] Guardian           Name(s): Phone Number(s): daughter, son    BASELINE (I)ADL(s) (prior to admission):  [place]: [ ]Total  [ ] Moderate [ ]Dependent  Palliative Performance Status Version 2:         %    http://[facility]pc[facility].org/files/news/palliative_performance_scale_ppsv2.pdf    Allergies    opioid-like analgesics (Unknown)  Levaquin (Unknown)  sulfa drugs (Hives)  penicillin (Hives)    Intolerances    MEDICATIONS  (STANDING):    MEDICATIONS  (PRN):  glycopyrrolate Injectable 0.4 milliGRAM(s) IV Push four times a day PRN Secretions  HYDROmorphone  Injectable 0.5 milliGRAM(s) IV Push every 2 hours PRN Moderate pain (4-6), Severe pain (7-10), Respiratory rate greater than 22      PRESENT SYMPTOMS: [X ]Unable to obtain due to poor mentation   Source if other than patient:  [ ]Family   [ ]Team   [ X]All review of systems negative including pain and dyspnea unless noted below    All components of pain assessment below addressed. Blank spaces indicate that the patient did/could not complete the assessment.  Pain: [ ]yes [ ]no  QOL impact -   Location -                    Aggravating factors -  Quality -  Radiation -  Timing-  Severity (0-10 scale):  Minimal acceptable level (0-10 scale):     CPOT:    https://www.[facility].org/getattachment/[email or web address]/Critical-Care-Pain-Observation-Tool-(CPOT)    PAIN AD Score: 0  http://geriatrictoolkit.[facility]/cog/painad.pdf (press ctrl +  left click to view)    Dyspnea:                           [ ]None[ ]Mild [ ]Moderate [ ]Severe     Respiratory Distress Observation Scale (RDOS): 1  A score of 0 to 2 signifies little or no respiratory distress, 3 signifies mild distress, scores 4 to 6 indicate moderate distress, and scores greater than 7 signify severe distress  https://www.[facility].ca/sites/default/files/PDFS/733339-lnnnqgzwyzb-tvmijuvo-xfxzrwpfyed-wokmb.pdf    Anxiety:                             [ ]None[ ]Mild [ ]Moderate [ ]Severe   Fatigue:                             [ ]None[ ]Mild [ ]Moderate [ ]Severe   Nausea:                             [ ]None[ ]Mild [ ]Moderate [ ]Severe   Loss of appetite:              [ ]None[ ]Mild [ ]Moderate [ ]Severe   Constipation:                    [ ]None[ ]Mild [ ]Moderate [ ]Severe    Other Symptoms:      Palliative Performance Status Version 2:         %    http://[facility].org/files/news/palliative_performance_scale_ppsv2.pdf  PHYSICAL EXAM:  Vital Signs Last 24 Hrs  T(C): 36.9 (04 Mar 2025 07:30), Max: 36.9 (04 Mar 2025 07:30)  T(F): 98.4 (04 Mar 2025 07:30), Max: 98.4 (04 Mar 2025 07:30)  HR: 68 (04 Mar 2025 09:00) (68 - 89)  BP: 60/35 (04 Mar 2025 09:00) (52/31 - 136/91)  BP(mean): 41 (04 Mar 2025 08:45) (38 - 67)  RR: 22 (04 Mar 2025 09:00) (22 - 30)  SpO2: 99% (04 Mar 2025 09:00) (96% - 99%)    Parameters below as of 04 Mar 2025 09:00  Patient On (Oxygen Delivery Method): BiPAP 12/6    O2 Concentration (%): 50 I&O's Summary      GENERAL:  [X ] No acute distress [ ]Lethargic  [ ]Unarousable  [ ]Verbal  [ ]Non-Verbal [ ]Cachexia    BEHAVIORAL/PSYCH:  [ ]Alert and Oriented x  [ ] Anxiety [ ] Delirium [ ] Agitation [X ] Calm   EYES: [ ] No scleral icterus [ ] Scleral icterus [X ] Closed  ENMT:  [ ]Dry mouth  [ ]No external oral lesions [ X] No external ear or nose lesions  CARDIOVASCULAR:  [ ]Regular [ ]Irregular [ ]Tachy [ ]Not Tachy  [ ]Madi [ ] Edema [ ] No edema  PULMONARY:  [ ]Tachypnea  [ ]Audible excessive secretions [X ] No labored breathing [ ] labored breathing  GASTROINTESTINAL: [ ]Soft  [ ]Distended  [ X]Not distended [ ]Non tender [ ]Tender  MUSCULOSKELETAL: [ ]No clubbing [ ] clubbing  [ X] No cyanosis [ ] cyanosis  NEUROLOGIC: [ ]No focal deficits  [ ]Follows commands  [ ]Does not follow commands  [X ]Cognitive impairment  [ ]Dysphagia  [ ]Dysarthria  [ ]Paresis   SKIN: [ ] Jaundiced [X ] Non-jaundiced [ ]Rash [ ]No Rash [ ] Warm [ ] Dry  MISC/LINES: [ ] ET tube [ ] Trach [ ]NGT/OGT [ ]PEG [ ]Suarez    CRITICAL CARE:  [ ] Shock Present  [ ]Septic [ ]Cardiogenic [ ]Neurologic [ ]Hypovolemic  [ ]  Vasopressors [ ]  Inotropes   [ ]Respiratory failure present [ ]Mechanical ventilation [ ]Non-invasive ventilatory support [ ]High flow  [ ]Acute  [ ]Chronic [ ]Hypoxic  [ ]Hypercarbic [ ]Other  [ ]Other organ failure     LABS: reviewed by me, notable for: CBC with leukocytosis, BMP with elevated SCr, UA WNL                        12.1   17.97 )-----------( 151      ( 04 Mar 2025 05:50 )             36.6   03-04    140  |  100  |  139[HH]  ----------------------------<  122[H]  5.3[H]   |  16[L]  |  4.0[H]    Ca    8.7      04 Mar 2025 05:50    TPro  5.6[L]  /  Alb  2.8[L]  /  TBili  0.5  /  DBili  x   /  AST  85[H]  /  ALT  85[H]  /  AlkPhos  307[H]  03-04  PT/INR - ( 04 Mar 2025 06:10 )   PT: 19.30 sec;   INR: 1.62 ratio         PTT - ( 04 Mar 2025 06:10 )  PTT:34.1 sec    Urinalysis Basic - ( 04 Mar 2025 05:50 )    Color: x / Appearance: x / SG: x / pH: x  Gluc: 122 mg/dL / Ketone: x  / Bili: x / Urobili: x   Blood: x / Protein: x / Nitrite: x   Leuk Esterase: x / RBC: x / WBC x   Sq Epi: x / Non Sq Epi: x / Bacteria: x      RADIOLOGY & ADDITIONAL STUDIES: CXR personally reviewed by me: no opacities noted    PROTEIN CALORIE MALNUTRITION PRESENT: [ ]mild [ ]moderate [ ]severe [ ]underweight [ ]morbid obesity  https://www.andeal.org/vault/2440/web/files/ONC/Table_Clinical%20Characteristics%20to%20Document%20Malnutrition-White%20JV%20et%20al%202012.pdf      Weight (kg): 70 (03-04-25 @ 06:18)    [ ]PPSV2 < or = to 30% [ ]significant weight loss  [ ]poor nutritional intake  [ ]anasarca      [ ]Artificial Nutrition          Palliative Care Spiritual/Emotional Screening Tool Question  Severity (0-4):                    OR                    [X ] Unable to determine/NA  Score of 2 or greater indicates recommendation of Chaplaincy referral  Chaplaincy Referral: [ ] Yes [ ] Refused [ ] Following     Caregiver [place]:  [ ] Yes [ ] No    OR    [x ] Unable to determine. Will assess at later time if appropriate.  Social Work Referral [ ]  Patient and Family Centered Care Referral [ ]    Anticipatory Grief Present: [ ] Yes [ ] No    OR     [ x] Unable to determine. Will assess at later time if appropriate.  Social Work Referral [ ]  Patient and Family Centered Care Referral [ ]    REFERRALS:   [ ]Chaplaincy  [ ]Hospice  [ ]Child Life  [ ]Social Work  [ ]Case management [ ]Holistic Therapy     Palliative care education provided to patient and/or family    Goals of Care Document:     ______________  Warren Gavin MD  Palliative Medicine  [facility]   of Geriatric and Palliative Medicine  [phone]  
SUBJECTIVE/OVERNIGHT EVENTS  Today is hospital day 3d. This morning patient was seen and examined at bedside, resting comfortably in bed. No acute or major events overnight.    MEDICATIONS  STANDING MEDICATIONS    PRN MEDICATIONS  glycopyrrolate Injectable 0.4 milliGRAM(s) IV Push four times a day PRN  HYDROmorphone  Injectable 0.5 milliGRAM(s) IV Push every 2 hours PRN  LORazepam   Injectable 0.5 milliGRAM(s) IV Push every 2 hours PRN    PHYSICAL EXAM  Pt appears comfortable, on NRB
HPI: 89F with PMH of HTN, HLD, CAD s/p PCI, AF on eliquis, breast cancer in remission, recurrent UTIs, here with dyspnea, and found to have acute hypoxic and hypercarbic respiratory failure, as well as transaminitis, ABRAHAN, and liver lobe mass on CTA (no PE noted). Family decided on CMO, palliative c/s for symptom management.    INTERVAL EVENTS  3/6: patient comfortable, daughter at bedside    ADVANCE DIRECTIVES:    [ ] Full Code [ X] DNR  MOLST  [ X]  Living Will  [ ]   DECISION MAKER(s):  [ ] Health Care Proxy(s)  [ ] Surrogate(s)  [ ] Guardian           Name(s): Phone Number(s): daughter, son    BASELINE (I)ADL(s) (prior to admission):  [place]: [ ]Total  [ ] Moderate [ ]Dependent  Palliative Performance Status Version 2:         %    [email or web address]    Allergies    opioid-like analgesics (Unknown)  Levaquin (Unknown)  sulfa drugs (Hives)  penicillin (Hives)    Intolerances    MEDICATIONS  (STANDING):    MEDICATIONS  (PRN):  glycopyrrolate Injectable 0.4 milliGRAM(s) IV Push four times a day PRN Secretions  HYDROmorphone  Injectable 0.5 milliGRAM(s) IV Push every 2 hours PRN Moderate pain (4-6), Severe pain (7-10), Respiratory rate greater than 22  LORazepam   Injectable 0.5 milliGRAM(s) IV Push every 2 hours PRN Anxiety      PRESENT SYMPTOMS: [X]Unable to obtain due to poor mentation   Source if other than patient:  [ ]Family   [ ]Team   [ X]All review of systems negative including pain and dyspnea unless noted below    All components of pain assessment below addressed. Blank spaces indicate that the patient did/could not complete the assessment.  Pain: [ ]yes [ ]no  QOL impact -   Location -                    Aggravating factors -  Quality -  Radiation -  Timing-  Severity (0-10 scale):  Minimal acceptable level (0-10 scale):     CPOT:    https://www.[facility].org/getattachment/nnz12j34-4h7r-0a4s-0n8l-0737v5774c2a/Critical-Care-Pain-Observation-Tool-(CPOT)    PAIN AD Score: 0  [email or web address]/painad.pdf (press ctrl +  left click to view)    Dyspnea:                           [ ]None[ ]Mild [ ]Moderate [ ]Severe     Respiratory Distress Observation Scale (RDOS): 1  A score of 0 to 2 signifies little or no respiratory distress, 3 signifies mild distress, scores 4 to 6 indicate moderate distress, and scores greater than 7 signify severe distress  [email or web address]://[email or web address]/sites/default/files/[email or web address]-sviuujhznsj-ebqpktpk-hslixktvwvv-yrcml.pdf    Anxiety:                             [ ]None[ ]Mild [ ]Moderate [ ]Severe   Fatigue:                             [ ]None[ ]Mild [ ]Moderate [ ]Severe   Nausea:                             [ ]None[ ]Mild [ ]Moderate [ ]Severe   Loss of appetite:              [ ]None[ ]Mild [ ]Moderate [ ]Severe   Constipation:                    [ ]None[ ]Mild [ ]Moderate [ ]Severe    Other Symptoms:      Palliative Performance Status Version 2:         %    [email or web address]  PHYSICAL EXAM:  Vital Signs Last 24 Hrs  T(C): --  T(F): --  HR: --  BP: --  BP(mean): --  RR: --  SpO2: --          GENERAL:  [X ] No acute distress [ ]Lethargic  [ ]Unarousable  [ ]Verbal  [ ]Non-Verbal [ ]Cachexia    BEHAVIORAL/PSYCH:  [ ]Alert and Oriented x  [ ] Anxiety [ ] Delirium [ ] Agitation [X ] Calm   EYES: [ ] No scleral icterus [ ] Scleral icterus [X ] Closed  ENMT:  [ ]Dry mouth  [ ]No external oral lesions [ X] No external ear or nose lesions  CARDIOVASCULAR:  [ ]Regular [ ]Irregular [ ]Tachy [ ]Not Tachy  [ ]Madi [ ] Edema [ ] No edema  PULMONARY:  [ ]Tachypnea  [ ]Audible excessive secretions [X ] No labored breathing [ ] labored breathing  GASTROINTESTINAL: [ ]Soft  [ ]Distended  [ X]Not distended [ ]Non tender [ ]Tender  MUSCULOSKELETAL: [ ]No clubbing [ ] clubbing  [ X] No cyanosis [ ] cyanosis  NEUROLOGIC: [ ]No focal deficits  [ ]Follows commands  [ ]Does not follow commands  [X ]Cognitive impairment  [ ]Dysphagia  [ ]Dysarthria  [ ]Paresis   SKIN: [ ] Jaundiced [X ] Non-jaundiced [ ]Rash [ ]No Rash [ ] Warm [ ] Dry  MISC/LINES: [ ] ET tube [ ] Trach [ ]NGT/OGT [ ]PEG [ ]Suarez    CRITICAL CARE:  [ ] Shock Present  [ ]Septic [ ]Cardiogenic [ ]Neurologic [ ]Hypovolemic  [ ]  Vasopressors [ ]  Inotropes   [ ]Respiratory failure present [ ]Mechanical ventilation [ ]Non-invasive ventilatory support [ ]High flow  [ ]Acute  [ ]Chronic [ ]Hypoxic  [ ]Hypercarbic [ ]Other  [ ]Other organ failure     LABS: reviewed by me, notable for: CBC with leukocytosis, no other labs for review                                   10.7   33.40 )-----------( 110      ( 06 Mar 2025 06:21 )             31.4           RADIOLOGY & ADDITIONAL STUDIES: CMO - no further imaging    PROTEIN CALORIE MALNUTRITION PRESENT: [ ]mild [ ]moderate [ ]severe [ ]underweight [ ]morbid obesity  https://www.[facility].org/vault/2440/web/files/ONC/Table_Clinical%20Characteristics%20to%20Document%20Malnutrition-White%20JV%20et%20al%202012.pdf      Weight (kg): 70 (03-04-25 @ 06:18)    [ ]PPSV2 < or = to 30% [ ]significant weight loss  [ ]poor nutritional intake  [ ]anasarca      [ ]Artificial Nutrition          Palliative Care Spiritual/Emotional Screening Tool Question  Severity (0-4):                    OR                    [X ] Unable to determine/NA  Score of 2 or greater indicates recommendation of Chaplaincy referral  Chaplaincy Referral: [ ] Yes [ ] Refused [ ] Following     Caregiver [place]:  [ ] Yes [ ] No    OR    [x ] Unable to determine. Will assess at later time if appropriate.  Social Work Referral [ ]  Patient and Family Centered Care Referral [ ]    Anticipatory Grief Present: [ ] Yes [ ] No    OR     [ x] Unable to determine. Will assess at later time if appropriate.  Social Work Referral [ ]  Patient and Family Centered Care Referral [ ]    REFERRALS:   [ ]Chaplaincy  [ ]Hospice  [ ]Child Life  [ ]Social Work  [ ]Case management [ ]Holistic Therapy     Palliative care education provided to patient and/or family    Goals of Care Document:     ______________  Warren Gavin MD  Palliative Medicine  [facility]   of Geriatric and Palliative Medicine  [phone]

## 2025-03-07 NOTE — DISCHARGE NOTE FOR THE EXPIRED PATIENT - HOSPITAL COURSE
89 y.o. female PMH of HTN, HLD, PCI sp 3 stents in  Afib on Eliquis, breast ca in remission since  , recurrent UTIs, presenting to the ED brought in by daughter for shortness of breath since 3am. Daughter states she lives with her and heard her wake up from coughing. She states she went to check on her and noted her to have difficulty breathing. Daughter reports patient is not oriented at baseline. Denies any known fevers, n/v/d, sick contacts. Denies any recent hospital admissions.Ne    1 week ago had a fever 2ry to UTI, finished a course of ciprofloxacin  allergic to pencillin, sulfa, levofloxacin and opiates  in the ED was satting 89% on non rebreather, placed on BPAP PEEP 6 IPAP 12 50% sating at 99%   Bedside echo showed positive McConnells sign, no signs of volumer overload, recieved 1 liter bolus  Labs showing Multisystem organ failure with ABRAHAN Cr 4 (family denies any renal history), ALT/AST 85/85, elevated INR (2ry to eliquis)    VBG 7.2, PCO2 44 HCO3 17 Metabolic acidosis with underlying resp acidosis    Patient made CMO on admission     Pt was seen by palliative and placed on dilaudid, robinul and ativan. The decision was made not to use BIPAP as pt was CMO. Pt  comfortably in her sleep at 11:44PM on 3/7/25. Family was informed, and Barb was notified.

## 2025-03-07 NOTE — PROGRESS NOTE ADULT - ASSESSMENT
89 y.o. female PMH of HTN, HLD, PCI sp 3 stents in 2007 Afib on Eliquis, breast ca in remission since 1992 , recurrent UTIs, presenting to the ED brought in by daughter for shortness of breath since 3am. Found to be in multi organ failure. Family elected to make pt CMO.    #Acute hypoxic hypercarbic resp failure, rule out PE  #MSOF: ABRAHAN, liver insult, hypotension  #Afib  #CAD  #CMO  - palliative recs appreciated  - c/w dilaudid 0.5mg IV Q2 PRN  - c/w robinul 0.5mg IV Q2 PRN  - c/w ativan 0.5mg IV Q2 PRN agitation, anxiety, dyspnea  - MOLST completed  - family wants to c/w NIV for now, awaiting grandson visiting patient    #Pending: hospice

## 2025-03-07 NOTE — HOSPICE CARE NOTE - CONVESATION DETAILS
Hospice referral received. Spoke with BRIAN Machuca, patient is actively dying. Hospice to follow up on Monday to check on patient's status.

## 2025-03-10 DIAGNOSIS — Z88.2 ALLERGY STATUS TO SULFONAMIDES: ICD-10-CM

## 2025-03-10 DIAGNOSIS — I48.91 UNSPECIFIED ATRIAL FIBRILLATION: ICD-10-CM

## 2025-03-10 DIAGNOSIS — I25.10 ATHEROSCLEROTIC HEART DISEASE OF NATIVE CORONARY ARTERY WITHOUT ANGINA PECTORIS: ICD-10-CM

## 2025-03-10 DIAGNOSIS — N17.9 ACUTE KIDNEY FAILURE, UNSPECIFIED: ICD-10-CM

## 2025-03-10 DIAGNOSIS — Z74.01 BED CONFINEMENT STATUS: ICD-10-CM

## 2025-03-10 DIAGNOSIS — R16.0 HEPATOMEGALY, NOT ELSEWHERE CLASSIFIED: ICD-10-CM

## 2025-03-10 DIAGNOSIS — Z88.0 ALLERGY STATUS TO PENICILLIN: ICD-10-CM

## 2025-03-10 DIAGNOSIS — E78.5 HYPERLIPIDEMIA, UNSPECIFIED: ICD-10-CM

## 2025-03-10 DIAGNOSIS — Z95.5 PRESENCE OF CORONARY ANGIOPLASTY IMPLANT AND GRAFT: ICD-10-CM

## 2025-03-10 DIAGNOSIS — I10 ESSENTIAL (PRIMARY) HYPERTENSION: ICD-10-CM

## 2025-03-10 DIAGNOSIS — J96.01 ACUTE RESPIRATORY FAILURE WITH HYPOXIA: ICD-10-CM

## 2025-03-10 DIAGNOSIS — Z79.02 LONG TERM (CURRENT) USE OF ANTITHROMBOTICS/ANTIPLATELETS: ICD-10-CM

## 2025-03-10 DIAGNOSIS — E87.4 MIXED DISORDER OF ACID-BASE BALANCE: ICD-10-CM

## 2025-03-10 DIAGNOSIS — R57.9 SHOCK, UNSPECIFIED: ICD-10-CM

## 2025-03-10 DIAGNOSIS — Z85.3 PERSONAL HISTORY OF MALIGNANT NEOPLASM OF BREAST: ICD-10-CM

## 2025-03-10 DIAGNOSIS — Z96.652 PRESENCE OF LEFT ARTIFICIAL KNEE JOINT: ICD-10-CM

## 2025-03-10 DIAGNOSIS — Z51.5 ENCOUNTER FOR PALLIATIVE CARE: ICD-10-CM

## 2025-03-10 DIAGNOSIS — Z66 DO NOT RESUSCITATE: ICD-10-CM

## 2025-03-10 DIAGNOSIS — J96.02 ACUTE RESPIRATORY FAILURE WITH HYPERCAPNIA: ICD-10-CM

## 2025-03-10 DIAGNOSIS — Z79.01 LONG TERM (CURRENT) USE OF ANTICOAGULANTS: ICD-10-CM
